# Patient Record
Sex: FEMALE | Race: WHITE | URBAN - METROPOLITAN AREA
[De-identification: names, ages, dates, MRNs, and addresses within clinical notes are randomized per-mention and may not be internally consistent; named-entity substitution may affect disease eponyms.]

---

## 2017-01-04 ENCOUNTER — EMERGENCY (EMERGENCY)
Facility: HOSPITAL | Age: 37
LOS: 0 days | Discharge: HOME | End: 2017-01-04
Admitting: FAMILY MEDICINE

## 2017-06-26 ENCOUNTER — EMERGENCY (EMERGENCY)
Facility: HOSPITAL | Age: 37
LOS: 0 days | Discharge: HOME | End: 2017-06-26
Admitting: FAMILY MEDICINE

## 2017-06-26 DIAGNOSIS — S93.601A UNSPECIFIED SPRAIN OF RIGHT FOOT, INITIAL ENCOUNTER: ICD-10-CM

## 2017-06-26 DIAGNOSIS — Z79.899 OTHER LONG TERM (CURRENT) DRUG THERAPY: ICD-10-CM

## 2017-06-26 DIAGNOSIS — J45.909 UNSPECIFIED ASTHMA, UNCOMPLICATED: ICD-10-CM

## 2017-06-26 DIAGNOSIS — J45.901 UNSPECIFIED ASTHMA WITH (ACUTE) EXACERBATION: ICD-10-CM
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
## 2017-06-26 DIAGNOSIS — K50.90 CROHN'S DISEASE, UNSPECIFIED, WITHOUT COMPLICATIONS: ICD-10-CM

## 2017-06-26 DIAGNOSIS — M79.671 PAIN IN RIGHT FOOT: ICD-10-CM
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
## 2017-06-27 DIAGNOSIS — Z87.891 PERSONAL HISTORY OF NICOTINE DEPENDENCE: ICD-10-CM

## 2017-06-27 DIAGNOSIS — J45.909 UNSPECIFIED ASTHMA, UNCOMPLICATED: ICD-10-CM

## 2017-06-27 DIAGNOSIS — M54.5 LOW BACK PAIN: ICD-10-CM
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
## 2018-01-24 ENCOUNTER — EMERGENCY (EMERGENCY)
Facility: HOSPITAL | Age: 38
LOS: 0 days | Discharge: HOME | End: 2018-01-24
Admitting: FAMILY MEDICINE

## 2018-01-24 DIAGNOSIS — J45.909 UNSPECIFIED ASTHMA, UNCOMPLICATED: ICD-10-CM

## 2018-01-24 DIAGNOSIS — R51 HEADACHE: ICD-10-CM

## 2018-01-24 DIAGNOSIS — Z79.811 LONG TERM (CURRENT) USE OF AROMATASE INHIBITORS: ICD-10-CM

## 2018-01-24 DIAGNOSIS — J45.901 UNSPECIFIED ASTHMA WITH (ACUTE) EXACERBATION: ICD-10-CM

## 2018-01-24 DIAGNOSIS — Z79.52 LONG TERM (CURRENT) USE OF SYSTEMIC STEROIDS: ICD-10-CM

## 2018-01-24 DIAGNOSIS — K50.90 CROHN'S DISEASE, UNSPECIFIED, WITHOUT COMPLICATIONS: ICD-10-CM

## 2018-01-24 DIAGNOSIS — M79.601 PAIN IN RIGHT ARM: ICD-10-CM

## 2018-01-24 DIAGNOSIS — Z79.51 LONG TERM (CURRENT) USE OF INHALED STEROIDS: ICD-10-CM

## 2018-01-24 DIAGNOSIS — Z79.899 OTHER LONG TERM (CURRENT) DRUG THERAPY: ICD-10-CM

## 2018-11-02 ENCOUNTER — TRANSCRIPTION ENCOUNTER (OUTPATIENT)
Age: 38
End: 2018-11-02

## 2018-11-20 ENCOUNTER — EMERGENCY (EMERGENCY)
Facility: HOSPITAL | Age: 38
LOS: 0 days | Discharge: HOME | End: 2018-11-21
Attending: EMERGENCY MEDICINE | Admitting: EMERGENCY MEDICINE

## 2018-11-20 VITALS
DIASTOLIC BLOOD PRESSURE: 105 MMHG | RESPIRATION RATE: 18 BRPM | HEART RATE: 125 BPM | TEMPERATURE: 96 F | SYSTOLIC BLOOD PRESSURE: 196 MMHG | OXYGEN SATURATION: 97 %

## 2018-11-20 VITALS
SYSTOLIC BLOOD PRESSURE: 124 MMHG | DIASTOLIC BLOOD PRESSURE: 78 MMHG | TEMPERATURE: 97 F | RESPIRATION RATE: 18 BRPM | OXYGEN SATURATION: 98 % | HEART RATE: 96 BPM

## 2018-11-20 DIAGNOSIS — R07.89 OTHER CHEST PAIN: ICD-10-CM

## 2018-11-20 DIAGNOSIS — M54.2 CERVICALGIA: ICD-10-CM

## 2018-11-20 DIAGNOSIS — F41.9 ANXIETY DISORDER, UNSPECIFIED: ICD-10-CM

## 2018-11-20 DIAGNOSIS — J45.909 UNSPECIFIED ASTHMA, UNCOMPLICATED: ICD-10-CM

## 2018-11-20 DIAGNOSIS — F17.210 NICOTINE DEPENDENCE, CIGARETTES, UNCOMPLICATED: ICD-10-CM

## 2018-11-20 DIAGNOSIS — M54.9 DORSALGIA, UNSPECIFIED: ICD-10-CM

## 2018-11-20 DIAGNOSIS — R06.02 SHORTNESS OF BREATH: ICD-10-CM

## 2018-11-20 DIAGNOSIS — G89.29 OTHER CHRONIC PAIN: ICD-10-CM

## 2018-11-20 DIAGNOSIS — R07.9 CHEST PAIN, UNSPECIFIED: ICD-10-CM

## 2018-11-20 LAB
ALBUMIN SERPL ELPH-MCNC: 5 G/DL — SIGNIFICANT CHANGE UP (ref 3.5–5.2)
ALP SERPL-CCNC: 85 U/L — SIGNIFICANT CHANGE UP (ref 30–115)
ALT FLD-CCNC: 39 U/L — SIGNIFICANT CHANGE UP (ref 0–41)
ANION GAP SERPL CALC-SCNC: 22 MMOL/L — HIGH (ref 7–14)
AST SERPL-CCNC: 30 U/L — SIGNIFICANT CHANGE UP (ref 0–41)
BASE EXCESS BLDV CALC-SCNC: -1.2 MMOL/L — SIGNIFICANT CHANGE UP (ref -2–2)
BASOPHILS # BLD AUTO: 0.05 K/UL — SIGNIFICANT CHANGE UP (ref 0–0.2)
BASOPHILS NFR BLD AUTO: 0.6 % — SIGNIFICANT CHANGE UP (ref 0–1)
BILIRUB SERPL-MCNC: 0.4 MG/DL — SIGNIFICANT CHANGE UP (ref 0.2–1.2)
BUN SERPL-MCNC: 10 MG/DL — SIGNIFICANT CHANGE UP (ref 10–20)
CA-I SERPL-SCNC: 1.22 MMOL/L — SIGNIFICANT CHANGE UP (ref 1.12–1.3)
CALCIUM SERPL-MCNC: 10 MG/DL — SIGNIFICANT CHANGE UP (ref 8.5–10.1)
CHLORIDE SERPL-SCNC: 95 MMOL/L — LOW (ref 98–110)
CO2 SERPL-SCNC: 19 MMOL/L — SIGNIFICANT CHANGE UP (ref 17–32)
CREAT SERPL-MCNC: 0.8 MG/DL — SIGNIFICANT CHANGE UP (ref 0.7–1.5)
D DIMER BLD IA.RAPID-MCNC: 85 NG/ML DDU — SIGNIFICANT CHANGE UP (ref 0–230)
EOSINOPHIL # BLD AUTO: 0.23 K/UL — SIGNIFICANT CHANGE UP (ref 0–0.7)
EOSINOPHIL NFR BLD AUTO: 2.6 % — SIGNIFICANT CHANGE UP (ref 0–8)
GAS PNL BLDV: 133 MMOL/L — LOW (ref 136–145)
GAS PNL BLDV: SIGNIFICANT CHANGE UP
GLUCOSE SERPL-MCNC: 93 MG/DL — SIGNIFICANT CHANGE UP (ref 70–99)
HCG SERPL QL: NEGATIVE — SIGNIFICANT CHANGE UP
HCO3 BLDV-SCNC: 24 MMOL/L — SIGNIFICANT CHANGE UP (ref 22–29)
HCT VFR BLD CALC: 44.4 % — SIGNIFICANT CHANGE UP (ref 37–47)
HCT VFR BLDA CALC: 51.2 % — HIGH (ref 34–44)
HGB BLD CALC-MCNC: 16.7 G/DL — SIGNIFICANT CHANGE UP (ref 14–18)
HGB BLD-MCNC: 15.7 G/DL — SIGNIFICANT CHANGE UP (ref 12–16)
IMM GRANULOCYTES NFR BLD AUTO: 0.3 % — SIGNIFICANT CHANGE UP (ref 0.1–0.3)
LACTATE BLDV-MCNC: 2.8 MMOL/L — HIGH (ref 0.5–1.6)
LACTATE SERPL-SCNC: 2.5 MMOL/L — HIGH (ref 0.5–2.2)
LIDOCAIN IGE QN: 25 U/L — SIGNIFICANT CHANGE UP (ref 7–60)
LYMPHOCYTES # BLD AUTO: 3.13 K/UL — SIGNIFICANT CHANGE UP (ref 1.2–3.4)
LYMPHOCYTES # BLD AUTO: 35.4 % — SIGNIFICANT CHANGE UP (ref 20.5–51.1)
MAGNESIUM SERPL-MCNC: 2.3 MG/DL — SIGNIFICANT CHANGE UP (ref 1.8–2.4)
MCHC RBC-ENTMCNC: 32.4 PG — HIGH (ref 27–31)
MCHC RBC-ENTMCNC: 35.4 G/DL — SIGNIFICANT CHANGE UP (ref 32–37)
MCV RBC AUTO: 91.5 FL — SIGNIFICANT CHANGE UP (ref 81–99)
MONOCYTES # BLD AUTO: 0.78 K/UL — HIGH (ref 0.1–0.6)
MONOCYTES NFR BLD AUTO: 8.8 % — SIGNIFICANT CHANGE UP (ref 1.7–9.3)
NEUTROPHILS # BLD AUTO: 4.62 K/UL — SIGNIFICANT CHANGE UP (ref 1.4–6.5)
NEUTROPHILS NFR BLD AUTO: 52.3 % — SIGNIFICANT CHANGE UP (ref 42.2–75.2)
NRBC # BLD: 0 /100 WBCS — SIGNIFICANT CHANGE UP (ref 0–0)
PCO2 BLDV: 39 MMHG — LOW (ref 41–51)
PH BLDV: 7.39 — SIGNIFICANT CHANGE UP (ref 7.26–7.43)
PLATELET # BLD AUTO: 363 K/UL — SIGNIFICANT CHANGE UP (ref 130–400)
PO2 BLDV: 28 MMHG — SIGNIFICANT CHANGE UP (ref 20–40)
POTASSIUM BLDV-SCNC: 4.1 MMOL/L — SIGNIFICANT CHANGE UP (ref 3.3–5.6)
POTASSIUM SERPL-MCNC: 4.7 MMOL/L — SIGNIFICANT CHANGE UP (ref 3.5–5)
POTASSIUM SERPL-SCNC: 4.7 MMOL/L — SIGNIFICANT CHANGE UP (ref 3.5–5)
PROT SERPL-MCNC: 7.8 G/DL — SIGNIFICANT CHANGE UP (ref 6–8)
RBC # BLD: 4.85 M/UL — SIGNIFICANT CHANGE UP (ref 4.2–5.4)
RBC # FLD: 12.1 % — SIGNIFICANT CHANGE UP (ref 11.5–14.5)
SAO2 % BLDV: 58 % — SIGNIFICANT CHANGE UP
SODIUM SERPL-SCNC: 136 MMOL/L — SIGNIFICANT CHANGE UP (ref 135–146)
TROPONIN T SERPL-MCNC: <0.01 NG/ML — SIGNIFICANT CHANGE UP
TROPONIN T SERPL-MCNC: <0.01 NG/ML — SIGNIFICANT CHANGE UP
WBC # BLD: 8.84 K/UL — SIGNIFICANT CHANGE UP (ref 4.8–10.8)
WBC # FLD AUTO: 8.84 K/UL — SIGNIFICANT CHANGE UP (ref 4.8–10.8)

## 2018-11-20 RX ORDER — IPRATROPIUM BROMIDE 0.2 MG/ML
500 SOLUTION, NON-ORAL INHALATION ONCE
Qty: 0 | Refills: 0 | Status: COMPLETED | OUTPATIENT
Start: 2018-11-20 | End: 2018-11-20

## 2018-11-20 RX ORDER — BUDESONIDE AND FORMOTEROL FUMARATE DIHYDRATE 160; 4.5 UG/1; UG/1
2 AEROSOL RESPIRATORY (INHALATION)
Qty: 1 | Refills: 0
Start: 2018-11-20 | End: 2018-12-19

## 2018-11-20 RX ORDER — ASPIRIN/CALCIUM CARB/MAGNESIUM 324 MG
325 TABLET ORAL ONCE
Qty: 0 | Refills: 0 | Status: COMPLETED | OUTPATIENT
Start: 2018-11-20 | End: 2018-11-20

## 2018-11-20 RX ORDER — SODIUM CHLORIDE 9 MG/ML
1000 INJECTION INTRAMUSCULAR; INTRAVENOUS; SUBCUTANEOUS ONCE
Qty: 0 | Refills: 0 | Status: COMPLETED | OUTPATIENT
Start: 2018-11-20 | End: 2018-11-20

## 2018-11-20 RX ORDER — ALPRAZOLAM 0.25 MG
0.25 TABLET ORAL ONCE
Qty: 0 | Refills: 0 | Status: DISCONTINUED | OUTPATIENT
Start: 2018-11-20 | End: 2018-11-20

## 2018-11-20 RX ORDER — ALBUTEROL 90 UG/1
1 AEROSOL, METERED ORAL DAILY
Qty: 0 | Refills: 0 | Status: DISCONTINUED | OUTPATIENT
Start: 2018-11-20 | End: 2018-11-20

## 2018-11-20 RX ADMIN — Medication 125 MILLIGRAM(S): at 18:17

## 2018-11-20 RX ADMIN — SODIUM CHLORIDE 1000 MILLILITER(S): 9 INJECTION INTRAMUSCULAR; INTRAVENOUS; SUBCUTANEOUS at 12:23

## 2018-11-20 RX ADMIN — Medication 50 MILLIGRAM(S): at 18:17

## 2018-11-20 RX ADMIN — Medication 325 MILLIGRAM(S): at 12:23

## 2018-11-20 RX ADMIN — Medication 500 MICROGRAM(S): at 13:22

## 2018-11-20 RX ADMIN — Medication 0.25 MILLIGRAM(S): at 12:23

## 2018-11-20 NOTE — ED PROVIDER NOTE - PHYSICAL EXAMINATION
VITAL SIGNS: I have reviewed nursing notes and confirm.  CONSTITUTIONAL: Well-developed; well-nourished; in no acute distress. uncomfortable  SKIN: skin exam is warm and dry, no acute rash.   HEAD: Normocephalic; atraumatic.  EYES:  EOM intact; conjunctiva and sclera clear.  ENT: No nasal discharge; airway clear. moist oral mucosa;   NECK: Supple; non tender.  CARD: S1, S2 normal; no murmurs, gallops, or rubs. Regular rate and rhythm. posterior tibial and radial pulses 2+  RESP: No wheezes, rales or rhonchi. cta b/l. no use of accessory muscles. no retractions  ABD: Normal bowel sounds; soft; non-distended; non-tender; no rebound.   EXT: Normal ROM. No  cyanosis or edema.  BACK: No cva tenderness  LYMPH: No acute cervical adenopathy.  NEURO: Alert, oriented, grossly unremarkable.    PSYCH: Cooperative, appropriate.

## 2018-11-20 NOTE — ED PROVIDER NOTE - MEDICAL DECISION MAKING DETAILS
Patient presented with chest pain and dyspnea. No risk factors for PE, but tachycardic so could not PERC out. Did D dimer which was negative. Work up in ED otherwise negative. Patient calmed down in ED, HR stabilized. However, placed in obs given severity of initial symptoms. Patient otherwise HD stable and agreeable with this plan.

## 2018-11-20 NOTE — ED PROVIDER NOTE - ATTENDING CONTRIBUTION TO CARE
38 year old female, pmhx anxiety, asthma, presenting with a 1 day history of tight substernal chest pain that is mildly pleuritic associated with extreme anxiety and palpitations as well as dyspnea. States she has not had this kind of episode before, and states this feels more severe than her asthma. Otherwise denies fevers, headache, vision changes, weakness/numbness, confusion, URI symptoms, neck pain, back pain,  cough, nausea, vomiting, abdominal pain, diarrhea, constipation, blood in stool/dark stools, urinary symptoms, vaginal bleeding/discharge, leg swelling, rash, recent travel or sick contacts.    Vital Signs: I have reviewed the initial vital signs.  Constitutional: Well-nourished, appears stated age, Severe emotional distress  HEENT: Airway patent, moist MM, no erythema/swelling/deformity of oral structures. EOMI, PERRLA.  CV: (+) tachycardic, regular rhythm well-perfused extremities, 2+ b/l DP and radial pulses equal.  Lungs: BCTA, no increased WOB.  ABD: NTND, no guarding or rebound, no pulsatile mass, no hernias.   MSK: Neck supple, nontender, nl ROM, no stepoff. Chest nontender. Back nontender in TLS spine or to b/l bony structures or flanks. Ext nontender, nl rom, no deformity.   INTEG: Skin warm, dry, no rash.  NEURO: A&Ox3, CN II-XII intact, normal strength 5/5 all 4 ext, nl sensation throughout, normal speech and coordination.  PSYCH: Calm, cooperative, normal affect and interaction.    Will get EKG, ACS work up, patient also tachycardic with pleuritic chest pain so will get d dimer, give IVF, re-eval

## 2018-11-20 NOTE — ED ADULT NURSE NOTE - CHPI ED NUR SYMPTOMS NEG
no fever/no diaphoresis/no back pain/no syncope/no nausea/no vomiting/no chills/no congestion/no dizziness

## 2018-11-20 NOTE — ED CDU PROVIDER DISPOSITION NOTE - CLINICAL COURSE
pt presented for eval of chest pain. placed in edou under low risk chest pain protocol. pt with workup including full set of labs, cxray wnl, 2 sets neg cardiac enzymes, 2 ekg with no acute ischemic changes. pt treated with nebs, steroids, pt unwillling to stay overnight for further re-eval. meds refilled will dc

## 2018-11-20 NOTE — ED CDU PROVIDER DISPOSITION NOTE - CARE PROVIDER_API CALL
Christophe Serrato), Cardiovascular Disease; Interventional Cardiology  [address] [place] [address]  Phone: [phone]  Fax: [phone]    Delio Resendez), Critical Care Medicine; Internal Medicine; Pulmonary Disease; Sleep Medicine  [address] [place] [address]  Phone: [phone]  Fax: [phone]

## 2018-11-20 NOTE — ED CDU PROVIDER INITIAL DAY NOTE - NS ED ROS FT
CONST: No fever, chills or bodyaches  EYES: No pain, redness, drainage or visual changes.  ENT: No ear pain or discharge, nasal discharge or congestion. No sore throat  CARD: No chest pain, palpitations  RESP: see HPI  GI: No abdominal pain, N/V/D  MS: No joint pain, back pain or extremity pain/injury  SKIN: No rashes

## 2018-11-20 NOTE — ED PROVIDER NOTE - OBJECTIVE STATEMENT
38 year old female, pmhx anxiety, asthma, presenting with a 1 day history of tight substernal chest pain that is mildly pleuritic associated with extreme anxiety and palpitations as well as dyspnea. States she has not had this kind of episode before, and states this feels more severe than her asthma. Otherwise denies fevers, headache, vision changes, weakness/numbness, confusion, URI symptoms, neck pain, back pain,  cough, nausea, vomiting, abdominal pain, diarrhea, constipation, blood in stool/dark stools, urinary symptoms, vaginal bleeding/discharge, leg swelling, rash, recent travel or sick contacts.

## 2018-11-20 NOTE — ED CDU PROVIDER INITIAL DAY NOTE - PHYSICAL EXAMINATION
CONST: Well appearing in NAD, anxious  EYES: PERRL, EOMI, Sclera and conjunctiva clear.   NECK: Non-tender,   CARD: Normal S1 S2; Normal rate and rhythm  RESP: Equal BS B/L, No wheezes, rhonchi or rales. No distress  GI: Soft, non-tender, non-distended.  MS: Normal ROM in all extremities. No midline spinal tenderness.  SKIN: Warm, dry, no acute rashes. Good turgor  NEURO: A&Ox3, No focal deficits. Strength 5/5 with no sensory deficits. Inability to extend digits on R hand.

## 2018-11-20 NOTE — ED CDU PROVIDER INITIAL DAY NOTE - PROGRESS NOTE DETAILS
pt unwilling to stay for further eval. 2 sets negative. will ama. pt treated with nebs, steroid. plan to observe, possible stress in am. The patient wishes to leave against medical advice.  I have discussed the risks, benefits and alternatives (including the possibility of worsening of disease, pain, permanent disability, and/or death) with the patient and his/her family (if available).  The patient voices understanding of these risks, benefits, and alternatives and still wishes to sign out against medical advice.  The patient is awake, alert, oriented  x 3 and has demonstrated capacity to refuse/direct care.  I have advised the patient that they can and should return immediately should they develop any worse/different/additional symptoms, or if they change their mind and want to continue their care. Patient refused neb treatment secondary to feeling "shakey" from IV solumedrol. Patient also reports not feeling any better. No active wheezing  The patient wishes to leave against medical advice.  I have discussed the risks, benefits and alternatives (including the possibility of worsening of disease, pain, permanent disability, and/or death) with the patient and his/her family (if available).  The patient voices understanding of these risks, benefits, and alternatives and still wishes to sign out against medical advice.  The patient is awake, alert, oriented  x 3 and has demonstrated capacity to refuse/direct care.  I have advised the patient that they can and should return immediately should they develop any worse/different/additional symptoms, or if they change their mind and want to continue their care. Patient removed IV herself and walked out of ED without signing AMA/DC papers. Was going to aide in patient in scheduling rapid clinic f/u, however patient left prior to this.

## 2018-11-20 NOTE — ED CDU PROVIDER INITIAL DAY NOTE - ATTENDING CONTRIBUTION TO CARE
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 pt has no pe risk factors.  CON: ao x 3, HENMT: clear oropharynx,  hoarse voice, neck supple,  CV: rrr, equal pulses b/l, RESP: cta b/l, GI:  soft, nontender, no rebound, no guarding, SKIN: no rash, MSK: no deformities, NEURO: no gross motor or sensory deficit Psychiatric: appropriate mood, appropriate affect

## 2018-11-20 NOTE — ED CDU PROVIDER INITIAL DAY NOTE - MEDICAL DECISION MAKING DETAILS
pt placed in edou under low risk chest pain protocol. pt with 2 sets negative, no ischemic changes on ekg, no events on cardiac monitor, cxray wnl, pt treated with neb and steroids. unwilling to stay for further eval and possible ccta/stress in am. will ama. will rx meds, card/pulm and medicine clinic f/u.

## 2018-11-20 NOTE — ED PROVIDER NOTE - NS ED ROS FT
Constitutional: (-) fever  Eyes/ENT: (-) blurry vision, (-) epistaxis  Cardiovascular: +chest pain, (-) syncope  Respiratory: (-) cough, +shortness of breath  Gastrointestinal: (-) vomiting, (-) diarrhea  Musculoskeletal: (-) neck pain, (-) back pain, (-) joint pain  Integumentary: (-) rash, (-) edema  Neurological: (-) headache, (-) altered mental status  Psychiatric: (-) hallucinations  Allergic/Immunologic: (-) pruritus

## 2018-11-20 NOTE — ED ADULT NURSE REASSESSMENT NOTE - NS ED NURSE REASSESS COMMENT FT1
Pts safety maintained, iv patent.A&OX4,speaking coherently. Pt denies any chest pain at this time. Pt on cardiac monitor/continous pulse ox. Pending tests/results. Will continue to monitor

## 2018-11-20 NOTE — ED CDU PROVIDER INITIAL DAY NOTE - OBJECTIVE STATEMENT
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 Patient reports no relief with at home nebs and that this feels "more severe" than her asthma. She also reports yearly bronchitis  and is typically on steroids at this time every year, however lost insurance and does not have a PMD. Denies fever, chills, cough, leg swelling, prolonged travel or usage of OCPs. Mother-MI at 61yo. No prior cardiac w/u.

## 2018-12-01 ENCOUNTER — OUTPATIENT (OUTPATIENT)
Dept: OUTPATIENT SERVICES | Facility: HOSPITAL | Age: 38
LOS: 1 days | End: 2018-12-01
Payer: MEDICAID

## 2018-12-03 ENCOUNTER — EMERGENCY (EMERGENCY)
Facility: HOSPITAL | Age: 38
LOS: 0 days | Discharge: HOME | End: 2018-12-03
Admitting: EMERGENCY MEDICINE

## 2018-12-03 VITALS
DIASTOLIC BLOOD PRESSURE: 72 MMHG | SYSTOLIC BLOOD PRESSURE: 148 MMHG | HEART RATE: 78 BPM | TEMPERATURE: 98 F | RESPIRATION RATE: 18 BRPM | OXYGEN SATURATION: 100 %

## 2018-12-03 DIAGNOSIS — Z32.02 ENCOUNTER FOR PREGNANCY TEST, RESULT NEGATIVE: ICD-10-CM

## 2018-12-03 DIAGNOSIS — J45.909 UNSPECIFIED ASTHMA, UNCOMPLICATED: ICD-10-CM

## 2018-12-03 DIAGNOSIS — Z79.899 OTHER LONG TERM (CURRENT) DRUG THERAPY: ICD-10-CM

## 2018-12-03 DIAGNOSIS — F17.200 NICOTINE DEPENDENCE, UNSPECIFIED, UNCOMPLICATED: ICD-10-CM

## 2018-12-03 LAB — HCG SERPL-ACNC: <0.6 MIU/ML — SIGNIFICANT CHANGE UP

## 2018-12-03 NOTE — ED PROVIDER NOTE - NSFOLLOWUPCLINICS_GEN_ALL_ED_FT
[facility]  Medicine  [address], [place]   Phone: [phone]  Fax:   Follow Up Time: 1-3 Days [facility]  Medicine  [address], [place]   Phone: [phone]  Fax:   Follow Up Time: 1-3 Days    [facility]  OB/GYN  [address] [place] [address]  Phone: [phone]  Fax:   Follow Up Time:

## 2018-12-03 NOTE — ED PROVIDER NOTE - PHYSICAL EXAMINATION
GENERAL: Well-developed, well-nourished, in no acute distress. sitting in ED chairs with sunglasses on.  CARDIOVASCULAR: Regular rate and rhythm, S1 and S2 present. No murmurs, rubs, or gallops. No edema. 2+ radial pulses bilaterally.  RESPIRATORY: Normal effort. Clear to auscultation bilaterally without wheezes, rales, or rhonchi.  GI: Normal bowel sounds. Abdomen soft and non-distended. Nontender in all four quadrants.  : No CVA tenderness bilaterally.  INTEGUMENTARY: Pink, warm, dry, no rashes. Capillary refill <2 seconds.  NEURO: A&Ox3. Moving all four extremities equally.

## 2018-12-03 NOTE — ED PROVIDER NOTE - PROGRESS NOTE DETAILS
patient refusing urine pregnancy testing, requesting serum bhcg. serum pregnancy testing negative I agree with evaluation and treatment of this patient with GARY Bauer.

## 2018-12-03 NOTE — ED ADULT NURSE NOTE - OBJECTIVE STATEMENT
Pt with C.O  B/.L hand numbness stiffness on and off for 1 year worse for 2 weeks also requesting pregnancy test .

## 2018-12-03 NOTE — ED PROVIDER NOTE - NS ED ROS FT
CONSTITUTIONAL: (-) fevers, (-) chills,   RESPIRATORY: (-) cough, (-) sputum, (-) shortness of breath  GI: (-) nausea/vomiting, (-) diarrhea/constipation, (-) abdominal pain  : (-) pelvic pain, (-) dysuria, (-) hematuria, (-) frequency, (-) urgency, (-) flank pain  INTEGUMENTARY: (-) rashes, (-) wounds

## 2018-12-03 NOTE — ED PROVIDER NOTE - OBJECTIVE STATEMENT
38 year old female with a history of asthma, chronic pain, anxiety presents to the ED requesting a serum pregnancy test. Patient states that she is concerned she may be pregnant as she has had unprotected intercourse during the last 2 months. She took Plan B at the end of September and has been using a friend's OCPs intermittently. LMP was at the end of september, she has had intermittent vaginal bleeding since. Denies vaginal discharge, abdominal pain, pelvic pain, dysuria, frequency, fevers, chills. Patient would like a pregnancy test because she is currently taking several medications for her chronic pain and asthma that she is concerned may affect a pregnancy. She is requesting a blood test as she has a history of two pregnancies with negative urine tests at home.

## 2018-12-04 PROBLEM — F41.9 ANXIETY DISORDER, UNSPECIFIED: Chronic | Status: ACTIVE | Noted: 2018-11-20

## 2018-12-04 PROBLEM — J45.909 UNSPECIFIED ASTHMA, UNCOMPLICATED: Chronic | Status: ACTIVE | Noted: 2018-11-20

## 2018-12-06 DIAGNOSIS — Z71.89 OTHER SPECIFIED COUNSELING: ICD-10-CM

## 2018-12-13 ENCOUNTER — EMERGENCY (EMERGENCY)
Facility: HOSPITAL | Age: 38
LOS: 0 days | Discharge: HOME | End: 2018-12-14
Admitting: EMERGENCY MEDICINE

## 2018-12-13 VITALS
HEART RATE: 115 BPM | DIASTOLIC BLOOD PRESSURE: 66 MMHG | OXYGEN SATURATION: 96 % | WEIGHT: 149.91 LBS | SYSTOLIC BLOOD PRESSURE: 123 MMHG | TEMPERATURE: 99 F | RESPIRATION RATE: 18 BRPM

## 2018-12-13 DIAGNOSIS — Z79.899 OTHER LONG TERM (CURRENT) DRUG THERAPY: ICD-10-CM

## 2018-12-13 DIAGNOSIS — M54.6 PAIN IN THORACIC SPINE: ICD-10-CM
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
## 2018-12-13 DIAGNOSIS — J45.909 UNSPECIFIED ASTHMA, UNCOMPLICATED: ICD-10-CM

## 2018-12-13 DIAGNOSIS — M54.5 LOW BACK PAIN: ICD-10-CM
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
## 2018-12-13 DIAGNOSIS — F17.200 NICOTINE DEPENDENCE, UNSPECIFIED, UNCOMPLICATED: ICD-10-CM
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
## 2018-12-13 DIAGNOSIS — M54.9 DORSALGIA, UNSPECIFIED: ICD-10-CM
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
## 2018-12-13 NOTE — ED ADULT TRIAGE NOTE - CHIEF COMPLAINT QUOTE
pt stated she was raking leaves 2 days ago and threw back out. pt has hx of Herniated disks. pt stated she took lyrica and oxycodone  prior to coming

## 2018-12-14 VITALS — SYSTOLIC BLOOD PRESSURE: 105 MMHG | RESPIRATION RATE: 18 BRPM | HEART RATE: 91 BPM | DIASTOLIC BLOOD PRESSURE: 56 MMHG

## 2018-12-14 RX ORDER — OXYCODONE AND ACETAMINOPHEN 5; 325 MG/1; MG/1
1 TABLET ORAL ONCE
Qty: 0 | Refills: 0 | Status: DISCONTINUED | OUTPATIENT
Start: 2018-12-14 | End: 2018-12-14

## 2018-12-14 RX ORDER — METHOCARBAMOL 500 MG/1
1000 TABLET, FILM COATED ORAL ONCE
Qty: 0 | Refills: 0 | Status: COMPLETED | OUTPATIENT
Start: 2018-12-14 | End: 2018-12-14

## 2018-12-14 RX ORDER — TIZANIDINE 4 MG/1
1 TABLET ORAL
Qty: 12 | Refills: 0 | OUTPATIENT
Start: 2018-12-14 | End: 2018-12-17

## 2018-12-14 RX ADMIN — OXYCODONE AND ACETAMINOPHEN 1 TABLET(S): 5; 325 TABLET ORAL at 00:51

## 2018-12-14 RX ADMIN — METHOCARBAMOL 1000 MILLIGRAM(S): 500 TABLET, FILM COATED ORAL at 00:51

## 2018-12-14 RX ADMIN — OXYCODONE AND ACETAMINOPHEN 1 TABLET(S): 5; 325 TABLET ORAL at 01:09

## 2018-12-14 NOTE — ED PROVIDER NOTE - NS ED ROS FT
Except as documented in HPI, all other ROS negative.   GENERAL: Denies fever/chills, loss of appetite/weight or fatigue.  SKIN: Denies rashes, abrasions, lacerations, ecchymosis, erythema, or edema.  HEAD: Denies headache, dizziness or trauma.  GI: Denies abdominal pain, n/v/d.   : Denies hematuria, dysuria or frequency. Denies urinary incontinence.   MSK: + back pain.   NEURO: Denies paresthesias, tingling, weakness.

## 2018-12-14 NOTE — ED PROVIDER NOTE - OBJECTIVE STATEMENT
Pt is a 37 y/o Female, PMHX of Crohn's, Asthma, chronic back pain, presents to ED w/ back pain x 3 days. Pt states she was gardening approx 3 days ago and today was doing laundry and heavy lifting and cleaning around the house, and she states her back went into spasms and she is not able to move or stand up straight. Pt denies fever, chills, urinary/fecal incontinence, trauma, prior back surgeries, numbness, weakness, abdominal pain, n/v. Pt has received previous epidural injections in her cervical spine by pain management.

## 2018-12-14 NOTE — ED PROVIDER NOTE - PHYSICAL EXAMINATION
VITAL SIGNS: I have reviewed the initial vital signs.   CONSTITUTIONAL: Awake, alert. Well-developed; well-nourished; in no distress. Non-toxic appearing.   SKIN: No rash, vesicles/lesion, abrasions or lacerations. No ecchymosis or signs of trauma.   HEAD: Normocephalic; atraumatic.   CARD: No chest wall deformity or tenderness. S1, S2 normal; no murmurs, gallops, or rubs. Regular rate and rhythm.  RESP: Good air movement. Lungs CTAB. No crackles, wheezes, rales or rhonchi.  ABD: Soft; non-distended; non-tender.   EXT: No bony deformity or tenderness. Normal ROM x 4 extremities. + parathoracic & paralumbar ttp. No midline spinal tenderness,   NEURO: A&Ox3. GCS 15. Normal speech. CN 2-12 intact. Strength 5/5 UE/LE b/l. No sensory deficits. n/v intact UE/LE b/l, pulses symmetrical.

## 2018-12-14 NOTE — ED PROVIDER NOTE - CARE PROVIDER_API CALL
Arvind Orellana), Neurology  [address] [place] [address]  Phone: [phone]  Fax: [phone]    Pramod Christianson (MD), Neurology; Neuromuscular Medicine  [address] [place] [id number]  Phone: [phone]  Fax: [phone]    Patricia Lizarraga), Neurology  [address] [place] [address] [address]  [place] [address]  Phone: [phone]  Fax: [phone]    Serge Wheeler), Neurology  [address] [place] [id number]  Phone: [phone]  Fax: [phone]

## 2018-12-14 NOTE — ED PROVIDER NOTE - CARE PROVIDERS DIRECT ADDRESSES
,DirectAddress_[email or web address]

## 2018-12-14 NOTE — ED ADULT NURSE NOTE - OBJECTIVE STATEMENT
Pt states she was doing laundry and afterwards could not stand straight d/t to pain. Denies n/v/d, denies trauma.

## 2018-12-14 NOTE — ED PROVIDER NOTE - MEDICAL DECISION MAKING DETAILS
Given no midline tenderness and hx of heavy lifting, likely muscular - given percocet and Robaxin in ED. Will send Tizanidine to pharmacy. Instructed pt to follow up with neurology, given info. Given no midline tenderness and hx of heavy lifting, likely muscular - given Percocet and Robaxin in ED. Was able to stand up and ambulate upon discharge. HR improved to 91. Will send Tizanidine to pharmacy. Instructed pt to follow up with neurology, given info.

## 2018-12-14 NOTE — ED ADULT NURSE REASSESSMENT NOTE - NS ED NURSE REASSESS COMMENT FT1
No s/s of distress, pt ambulating around room intermittently. Comfort measures offered, will cont to monitor.

## 2018-12-14 NOTE — ED ADULT NURSE NOTE - NSIMPLEMENTINTERV_GEN_ALL_ED
Implemented All Universal Safety Interventions:  [place] to call system. Call bell, personal items and telephone within reach. Instruct patient to call for assistance. Room bathroom lighting operational. Non-slip footwear when patient is off stretcher. Physically safe environment: no spills, clutter or unnecessary equipment. Stretcher in lowest position, wheels locked, appropriate side rails in place.

## 2019-01-01 PROCEDURE — G9001: CPT

## 2019-02-01 ENCOUNTER — OUTPATIENT (OUTPATIENT)
Dept: OUTPATIENT SERVICES | Facility: HOSPITAL | Age: 39
LOS: 1 days | End: 2019-02-01

## 2019-02-11 ENCOUNTER — INPATIENT (INPATIENT)
Facility: HOSPITAL | Age: 39
LOS: 2 days | Discharge: HOME | End: 2019-02-14
Attending: INTERNAL MEDICINE | Admitting: INTERNAL MEDICINE

## 2019-02-11 VITALS
DIASTOLIC BLOOD PRESSURE: 62 MMHG | HEART RATE: 113 BPM | SYSTOLIC BLOOD PRESSURE: 119 MMHG | RESPIRATION RATE: 18 BRPM | TEMPERATURE: 96 F | OXYGEN SATURATION: 96 %

## 2019-02-11 DIAGNOSIS — M54.9 DORSALGIA, UNSPECIFIED: ICD-10-CM

## 2019-02-11 DIAGNOSIS — F11.90 OPIOID USE, UNSPECIFIED, UNCOMPLICATED: ICD-10-CM

## 2019-02-11 DIAGNOSIS — Z87.828 PERSONAL HISTORY OF OTHER (HEALED) PHYSICAL INJURY AND TRAUMA: ICD-10-CM

## 2019-02-11 DIAGNOSIS — A70: ICD-10-CM

## 2019-02-11 DIAGNOSIS — G89.29 OTHER CHRONIC PAIN: ICD-10-CM

## 2019-02-11 DIAGNOSIS — M51.9 UNSPECIFIED THORACIC, THORACOLUMBAR AND LUMBOSACRAL INTERVERTEBRAL DISC DISORDER: ICD-10-CM

## 2019-02-11 DIAGNOSIS — R07.9 CHEST PAIN, UNSPECIFIED: ICD-10-CM

## 2019-02-11 DIAGNOSIS — T65.94XA TOXIC EFFECT OF UNSPECIFIED SUBSTANCE, UNDETERMINED, INITIAL ENCOUNTER: ICD-10-CM

## 2019-02-11 DIAGNOSIS — J82 PULMONARY EOSINOPHILIA, NOT ELSEWHERE CLASSIFIED: ICD-10-CM

## 2019-02-11 DIAGNOSIS — J70.9 RESPIRATORY CONDITIONS DUE TO UNSPECIFIED EXTERNAL AGENT: ICD-10-CM

## 2019-02-11 DIAGNOSIS — E87.5 HYPERKALEMIA: ICD-10-CM

## 2019-02-11 DIAGNOSIS — J68.0 BRONCHITIS AND PNEUMONITIS DUE TO CHEMICALS, GASES, FUMES AND VAPORS: ICD-10-CM

## 2019-02-11 DIAGNOSIS — F10.10 ALCOHOL ABUSE, UNCOMPLICATED: ICD-10-CM

## 2019-02-11 DIAGNOSIS — K50.00 CROHN'S DISEASE OF SMALL INTESTINE WITHOUT COMPLICATIONS: ICD-10-CM

## 2019-02-11 DIAGNOSIS — F41.1 GENERALIZED ANXIETY DISORDER: ICD-10-CM

## 2019-02-11 DIAGNOSIS — F17.210 NICOTINE DEPENDENCE, CIGARETTES, UNCOMPLICATED: ICD-10-CM

## 2019-02-11 DIAGNOSIS — G56.20 LESION OF ULNAR NERVE, UNSPECIFIED UPPER LIMB: ICD-10-CM

## 2019-02-11 LAB
ALBUMIN SERPL ELPH-MCNC: 3.8 G/DL — SIGNIFICANT CHANGE UP (ref 3.5–5.2)
ALP SERPL-CCNC: 72 U/L — SIGNIFICANT CHANGE UP (ref 30–115)
ALT FLD-CCNC: 26 U/L — SIGNIFICANT CHANGE UP (ref 0–41)
ANION GAP SERPL CALC-SCNC: 15 MMOL/L — HIGH (ref 7–14)
APTT BLD: 21.5 SEC — CRITICAL LOW (ref 27–39.2)
AST SERPL-CCNC: 60 U/L — HIGH (ref 0–41)
BASOPHILS # BLD AUTO: 0.09 K/UL — SIGNIFICANT CHANGE UP (ref 0–0.2)
BASOPHILS NFR BLD AUTO: 0.5 % — SIGNIFICANT CHANGE UP (ref 0–1)
BILIRUB SERPL-MCNC: <0.2 MG/DL — SIGNIFICANT CHANGE UP (ref 0.2–1.2)
BUN SERPL-MCNC: 11 MG/DL — SIGNIFICANT CHANGE UP (ref 10–20)
CALCIUM SERPL-MCNC: 8.9 MG/DL — SIGNIFICANT CHANGE UP (ref 8.5–10.1)
CHLORIDE SERPL-SCNC: 97 MMOL/L — LOW (ref 98–110)
CHOLEST SERPL-MCNC: 190 MG/DL — SIGNIFICANT CHANGE UP (ref 100–200)
CO2 SERPL-SCNC: 22 MMOL/L — SIGNIFICANT CHANGE UP (ref 17–32)
CREAT SERPL-MCNC: 0.7 MG/DL — SIGNIFICANT CHANGE UP (ref 0.7–1.5)
D DIMER BLD IA.RAPID-MCNC: 185 NG/ML DDU — SIGNIFICANT CHANGE UP (ref 0–230)
EOSINOPHIL # BLD AUTO: 3.5 K/UL — HIGH (ref 0–0.7)
EOSINOPHIL NFR BLD AUTO: 20.5 % — HIGH (ref 0–8)
GLUCOSE SERPL-MCNC: 69 MG/DL — LOW (ref 70–99)
HCG SERPL QL: NEGATIVE — SIGNIFICANT CHANGE UP
HCT VFR BLD CALC: 36 % — LOW (ref 37–47)
HDLC SERPL-MCNC: 31 MG/DL — LOW
HGB BLD-MCNC: 12.3 G/DL — SIGNIFICANT CHANGE UP (ref 12–16)
IMM GRANULOCYTES NFR BLD AUTO: 0.5 % — HIGH (ref 0.1–0.3)
INR BLD: 1.1 RATIO — SIGNIFICANT CHANGE UP (ref 0.65–1.3)
LIDOCAIN IGE QN: 27 U/L — SIGNIFICANT CHANGE UP (ref 7–60)
LIPID PNL WITH DIRECT LDL SERPL: 142 MG/DL — HIGH (ref 4–129)
LYMPHOCYTES # BLD AUTO: 21.7 % — SIGNIFICANT CHANGE UP (ref 20.5–51.1)
LYMPHOCYTES # BLD AUTO: 3.7 K/UL — HIGH (ref 1.2–3.4)
MCHC RBC-ENTMCNC: 31.6 PG — HIGH (ref 27–31)
MCHC RBC-ENTMCNC: 34.2 G/DL — SIGNIFICANT CHANGE UP (ref 32–37)
MCV RBC AUTO: 92.5 FL — SIGNIFICANT CHANGE UP (ref 81–99)
MONOCYTES # BLD AUTO: 0.49 K/UL — SIGNIFICANT CHANGE UP (ref 0.1–0.6)
MONOCYTES NFR BLD AUTO: 2.9 % — SIGNIFICANT CHANGE UP (ref 1.7–9.3)
NEUTROPHILS # BLD AUTO: 9.17 K/UL — HIGH (ref 1.4–6.5)
NEUTROPHILS NFR BLD AUTO: 53.9 % — SIGNIFICANT CHANGE UP (ref 42.2–75.2)
NRBC # BLD: 0 /100 WBCS — SIGNIFICANT CHANGE UP (ref 0–0)
PLATELET # BLD AUTO: 359 K/UL — SIGNIFICANT CHANGE UP (ref 130–400)
POTASSIUM SERPL-MCNC: 9.5 MMOL/L — CRITICAL HIGH (ref 3.5–5)
POTASSIUM SERPL-SCNC: 9.5 MMOL/L — CRITICAL HIGH (ref 3.5–5)
PROT SERPL-MCNC: 7.4 G/DL — SIGNIFICANT CHANGE UP (ref 6–8)
PROTHROM AB SERPL-ACNC: 12.6 SEC — SIGNIFICANT CHANGE UP (ref 9.95–12.87)
RBC # BLD: 3.89 M/UL — LOW (ref 4.2–5.4)
RBC # FLD: 12.7 % — SIGNIFICANT CHANGE UP (ref 11.5–14.5)
SODIUM SERPL-SCNC: 134 MMOL/L — LOW (ref 135–146)
TOTAL CHOLESTEROL/HDL RATIO MEASUREMENT: 6.1 RATIO — HIGH (ref 4–5.5)
TRIGL SERPL-MCNC: 131 MG/DL — SIGNIFICANT CHANGE UP (ref 10–149)
TROPONIN T SERPL-MCNC: <0.01 NG/ML — SIGNIFICANT CHANGE UP
WBC # BLD: 17.04 K/UL — HIGH (ref 4.8–10.8)
WBC # FLD AUTO: 17.04 K/UL — HIGH (ref 4.8–10.8)

## 2019-02-11 RX ORDER — SODIUM CHLORIDE 9 MG/ML
3 INJECTION INTRAMUSCULAR; INTRAVENOUS; SUBCUTANEOUS EVERY 8 HOURS
Qty: 0 | Refills: 0 | Status: DISCONTINUED | OUTPATIENT
Start: 2019-02-11 | End: 2019-02-14

## 2019-02-11 RX ORDER — ASPIRIN/CALCIUM CARB/MAGNESIUM 324 MG
162 TABLET ORAL ONCE
Qty: 0 | Refills: 0 | Status: COMPLETED | OUTPATIENT
Start: 2019-02-11 | End: 2019-02-11

## 2019-02-11 RX ORDER — IPRATROPIUM/ALBUTEROL SULFATE 18-103MCG
3 AEROSOL WITH ADAPTER (GRAM) INHALATION ONCE
Qty: 0 | Refills: 0 | Status: COMPLETED | OUTPATIENT
Start: 2019-02-11 | End: 2019-02-11

## 2019-02-11 RX ORDER — MORPHINE SULFATE 50 MG/1
4 CAPSULE, EXTENDED RELEASE ORAL ONCE
Qty: 0 | Refills: 0 | Status: DISCONTINUED | OUTPATIENT
Start: 2019-02-11 | End: 2019-02-11

## 2019-02-11 RX ADMIN — MORPHINE SULFATE 4 MILLIGRAM(S): 50 CAPSULE, EXTENDED RELEASE ORAL at 22:52

## 2019-02-11 RX ADMIN — Medication 3 MILLILITER(S): at 22:53

## 2019-02-11 RX ADMIN — MORPHINE SULFATE 4 MILLIGRAM(S): 50 CAPSULE, EXTENDED RELEASE ORAL at 22:56

## 2019-02-11 RX ADMIN — Medication 162 MILLIGRAM(S): at 22:52

## 2019-02-11 RX ADMIN — SODIUM CHLORIDE 3 MILLILITER(S): 9 INJECTION INTRAMUSCULAR; INTRAVENOUS; SUBCUTANEOUS at 22:53

## 2019-02-11 NOTE — ED ADULT NURSE NOTE - NSIMPLEMENTINTERV_GEN_ALL_ED
Implemented All Universal Safety Interventions:  [place] to call system. Call bell, personal items and telephone within reach. Instruct patient to call for assistance. Room bathroom lighting operational. Non-slip footwear when patient is off stretcher. Physically safe environment: no spills, clutter or unnecessary equipment. Stretcher in lowest position, wheels locked, appropriate side rails in place.

## 2019-02-11 NOTE — ED PROVIDER NOTE - CARE PLAN
Principal Discharge DX:	Pneumonia of both lungs due to infectious organism, unspecified part of lung  Secondary Diagnosis:	Chest pain, unspecified type

## 2019-02-11 NOTE — ED PROVIDER NOTE - OBJECTIVE STATEMENT
38 yo female with pMH of "spinal cord damage", Crohn's, asthma, herniated disc, anxiety presents to the ER for left sided CP PTA. PT states it occurred at 1600 and then again PTA. Pt feels sharp pain, left chest, tingling down arms bilaterally. Denies any fever, +cough with phlegm, no N/V/D/palpitations/dizziness/HA/neck pain/ Pt states recent URI and has some hoarseness to her voice. Pt has no leg pain or swelling, no tingling down the legs. Pt poor historian and states her meds are all in computer, and she just needs something for pain.     PMH as above  Meds: Symbicort, albuterol, flexeril, oxycodone, meloxicam, duloxetine, gabapentin  ALL: nkda  SH: +smoker, +etoh  LMP Nov, irregular  PMD Mir

## 2019-02-11 NOTE — ED PROVIDER NOTE - PMH
Called pharmacy 9/26/18. Was not aware of sildenafil. I would recommend holding sildenafil usage until he meets with cardiology. Will send my chart message.    Guillermina Issa MD FAAN  Department of Neurology  Pager [phone]         Anxiety    Asthma

## 2019-02-11 NOTE — ED PROVIDER NOTE - PHYSICAL EXAMINATION
VITAL SIGNS: I have reviewed nursing notes and confirm.  CONSTITUTIONAL: Well-developed; well-nourished; in mod acute distress, crying for pain meds.  SKIN: Skin exam is warm and dry, no acute rash.  HEAD: Normocephalic; atraumatic.  EYES: PERRL, EOM intact; conjunctiva and sclera clear.  ENT: No nasal discharge; airway clear. TMs clear.  NECK: Supple; non tender.  CARD: S1, S2 normal; no murmurs, gallops, or rubs. Regular rate and rhythm. +chest wall tenderness to left chest  RESP: No wheezes, rales or rhonchi.  ABD: Normal bowel sounds; soft; non-distended; non-tender; no hepatosplenomegaly.  EXT: Normal ROM. No clubbing, cyanosis or edema.  LYMPH: No acute cervical adenopathy.  NEURO: Alert, oriented. Grossly unremarkable. No focal deficits.  PSYCH: Cooperative, anxious.

## 2019-02-11 NOTE — ED PROVIDER NOTE - MEDICAL DECISION MAKING DETAILS
40 yo female presents to the ER for left side CP, cough. +PNA on xray. Labs reviewed. Given bilateral nature of the PNA will admit.

## 2019-02-12 LAB
ANION GAP SERPL CALC-SCNC: 17 MMOL/L — HIGH (ref 7–14)
BASOPHILS # BLD AUTO: 0.07 K/UL — SIGNIFICANT CHANGE UP (ref 0–0.2)
BASOPHILS NFR BLD AUTO: 0.7 % — SIGNIFICANT CHANGE UP (ref 0–1)
BUN SERPL-MCNC: 9 MG/DL — LOW (ref 10–20)
CA-I SERPL-SCNC: 1.16 MMOL/L — SIGNIFICANT CHANGE UP (ref 1.12–1.3)
CALCIUM SERPL-MCNC: 9.5 MG/DL — SIGNIFICANT CHANGE UP (ref 8.5–10.1)
CHLORIDE SERPL-SCNC: 98 MMOL/L — SIGNIFICANT CHANGE UP (ref 98–110)
CK MB CFR SERPL CALC: 3.9 NG/ML — SIGNIFICANT CHANGE UP (ref 0.6–6.3)
CK SERPL-CCNC: 213 U/L — SIGNIFICANT CHANGE UP (ref 0–225)
CO2 SERPL-SCNC: 21 MMOL/L — SIGNIFICANT CHANGE UP (ref 17–32)
CREAT SERPL-MCNC: 0.6 MG/DL — LOW (ref 0.7–1.5)
EOSINOPHIL # BLD AUTO: 3.37 K/UL — HIGH (ref 0–0.7)
EOSINOPHIL NFR BLD AUTO: 33.2 % — HIGH (ref 0–8)
ERYTHROCYTE [SEDIMENTATION RATE] IN BLOOD: 54 MM/HR — HIGH (ref 0–15)
GAS PNL BLDV: 136 MMOL/L — SIGNIFICANT CHANGE UP (ref 136–145)
GAS PNL BLDV: SIGNIFICANT CHANGE UP
GLUCOSE SERPL-MCNC: 104 MG/DL — HIGH (ref 70–99)
HCO3 BLDV-SCNC: 24 MMOL/L — SIGNIFICANT CHANGE UP (ref 22–29)
HCT VFR BLD CALC: 38.4 % — SIGNIFICANT CHANGE UP (ref 37–47)
HCT VFR BLDA CALC: 39 % — SIGNIFICANT CHANGE UP (ref 34–44)
HGB BLD CALC-MCNC: 12.7 G/DL — LOW (ref 14–18)
HGB BLD-MCNC: 13.3 G/DL — SIGNIFICANT CHANGE UP (ref 12–16)
IMM GRANULOCYTES NFR BLD AUTO: 0.4 % — HIGH (ref 0.1–0.3)
LACTATE BLDV-MCNC: 1.3 MMOL/L — SIGNIFICANT CHANGE UP (ref 0.5–1.6)
LDH SERPL L TO P-CCNC: 379 — HIGH (ref 50–242)
LYMPHOCYTES # BLD AUTO: 2.87 K/UL — SIGNIFICANT CHANGE UP (ref 1.2–3.4)
LYMPHOCYTES # BLD AUTO: 28.2 % — SIGNIFICANT CHANGE UP (ref 20.5–51.1)
MAGNESIUM SERPL-MCNC: 2.2 MG/DL — SIGNIFICANT CHANGE UP (ref 1.8–2.4)
MCHC RBC-ENTMCNC: 32.1 PG — HIGH (ref 27–31)
MCHC RBC-ENTMCNC: 34.6 G/DL — SIGNIFICANT CHANGE UP (ref 32–37)
MCV RBC AUTO: 92.8 FL — SIGNIFICANT CHANGE UP (ref 81–99)
MONOCYTES # BLD AUTO: 0.38 K/UL — SIGNIFICANT CHANGE UP (ref 0.1–0.6)
MONOCYTES NFR BLD AUTO: 3.7 % — SIGNIFICANT CHANGE UP (ref 1.7–9.3)
NEUTROPHILS # BLD AUTO: 3.43 K/UL — SIGNIFICANT CHANGE UP (ref 1.4–6.5)
NEUTROPHILS NFR BLD AUTO: 33.8 % — LOW (ref 42.2–75.2)
NRBC # BLD: 0 /100 WBCS — SIGNIFICANT CHANGE UP (ref 0–0)
PCO2 BLDV: 37 MMHG — LOW (ref 41–51)
PH BLDV: 7.42 — SIGNIFICANT CHANGE UP (ref 7.26–7.43)
PLATELET # BLD AUTO: 416 K/UL — HIGH (ref 130–400)
PO2 BLDV: 88 MMHG — HIGH (ref 20–40)
POTASSIUM BLDV-SCNC: 3.7 MMOL/L — SIGNIFICANT CHANGE UP (ref 3.3–5.6)
POTASSIUM SERPL-MCNC: 5.1 MMOL/L — HIGH (ref 3.5–5)
POTASSIUM SERPL-SCNC: 5.1 MMOL/L — HIGH (ref 3.5–5)
RBC # BLD: 4.14 M/UL — LOW (ref 4.2–5.4)
RBC # FLD: 12.6 % — SIGNIFICANT CHANGE UP (ref 11.5–14.5)
SAO2 % BLDV: 98 % — SIGNIFICANT CHANGE UP
SODIUM SERPL-SCNC: 136 MMOL/L — SIGNIFICANT CHANGE UP (ref 135–146)
TROPONIN T SERPL-MCNC: <0.01 NG/ML — SIGNIFICANT CHANGE UP
WBC # BLD: 10.16 K/UL — SIGNIFICANT CHANGE UP (ref 4.8–10.8)
WBC # FLD AUTO: 10.16 K/UL — SIGNIFICANT CHANGE UP (ref 4.8–10.8)

## 2019-02-12 RX ORDER — GABAPENTIN 400 MG/1
300 CAPSULE ORAL
Qty: 0 | Refills: 0 | Status: DISCONTINUED | OUTPATIENT
Start: 2019-02-12 | End: 2019-02-14

## 2019-02-12 RX ORDER — ALPRAZOLAM 0.25 MG
1 TABLET ORAL DAILY
Qty: 0 | Refills: 0 | Status: DISCONTINUED | OUTPATIENT
Start: 2019-02-12 | End: 2019-02-14

## 2019-02-12 RX ORDER — AZITHROMYCIN 500 MG/1
500 TABLET, FILM COATED ORAL ONCE
Qty: 0 | Refills: 0 | Status: COMPLETED | OUTPATIENT
Start: 2019-02-12 | End: 2019-02-12

## 2019-02-12 RX ORDER — ACETAMINOPHEN 500 MG
650 TABLET ORAL EVERY 6 HOURS
Qty: 0 | Refills: 0 | Status: DISCONTINUED | OUTPATIENT
Start: 2019-02-12 | End: 2019-02-14

## 2019-02-12 RX ORDER — OXYCODONE AND ACETAMINOPHEN 5; 325 MG/1; MG/1
1 TABLET ORAL ONCE
Qty: 0 | Refills: 0 | Status: DISCONTINUED | OUTPATIENT
Start: 2019-02-12 | End: 2019-02-12

## 2019-02-12 RX ORDER — BUDESONIDE AND FORMOTEROL FUMARATE DIHYDRATE 160; 4.5 UG/1; UG/1
0 AEROSOL RESPIRATORY (INHALATION)
Qty: 10 | Refills: 0 | COMMUNITY

## 2019-02-12 RX ORDER — ENOXAPARIN SODIUM 100 MG/ML
40 INJECTION SUBCUTANEOUS EVERY 24 HOURS
Qty: 0 | Refills: 0 | Status: DISCONTINUED | OUTPATIENT
Start: 2019-02-12 | End: 2019-02-14

## 2019-02-12 RX ORDER — TIZANIDINE 4 MG/1
0 TABLET ORAL
Qty: 12 | Refills: 0 | COMMUNITY

## 2019-02-12 RX ORDER — BUDESONIDE AND FORMOTEROL FUMARATE DIHYDRATE 160; 4.5 UG/1; UG/1
2 AEROSOL RESPIRATORY (INHALATION)
Qty: 0 | Refills: 0 | Status: DISCONTINUED | OUTPATIENT
Start: 2019-02-12 | End: 2019-02-14

## 2019-02-12 RX ORDER — CEFTRIAXONE 500 MG/1
1 INJECTION, POWDER, FOR SOLUTION INTRAMUSCULAR; INTRAVENOUS EVERY 24 HOURS
Qty: 0 | Refills: 0 | Status: DISCONTINUED | OUTPATIENT
Start: 2019-02-12 | End: 2019-02-12

## 2019-02-12 RX ORDER — DULOXETINE HYDROCHLORIDE 30 MG/1
60 CAPSULE, DELAYED RELEASE ORAL DAILY
Qty: 0 | Refills: 0 | Status: DISCONTINUED | OUTPATIENT
Start: 2019-02-12 | End: 2019-02-14

## 2019-02-12 RX ORDER — ALBUTEROL 90 UG/1
2 AEROSOL, METERED ORAL EVERY 6 HOURS
Qty: 0 | Refills: 0 | Status: DISCONTINUED | OUTPATIENT
Start: 2019-02-12 | End: 2019-02-14

## 2019-02-12 RX ADMIN — OXYCODONE AND ACETAMINOPHEN 1 TABLET(S): 5; 325 TABLET ORAL at 04:28

## 2019-02-12 RX ADMIN — GABAPENTIN 300 MILLIGRAM(S): 400 CAPSULE ORAL at 04:28

## 2019-02-12 RX ADMIN — SODIUM CHLORIDE 3 MILLILITER(S): 9 INJECTION INTRAMUSCULAR; INTRAVENOUS; SUBCUTANEOUS at 05:39

## 2019-02-12 RX ADMIN — ENOXAPARIN SODIUM 40 MILLIGRAM(S): 100 INJECTION SUBCUTANEOUS at 05:48

## 2019-02-12 RX ADMIN — AZITHROMYCIN 255 MILLIGRAM(S): 500 TABLET, FILM COATED ORAL at 03:00

## 2019-02-12 RX ADMIN — Medication 60 MILLIGRAM(S): at 17:30

## 2019-02-12 RX ADMIN — CEFTRIAXONE 100 GRAM(S): 500 INJECTION, POWDER, FOR SOLUTION INTRAMUSCULAR; INTRAVENOUS at 02:15

## 2019-02-12 RX ADMIN — GABAPENTIN 300 MILLIGRAM(S): 400 CAPSULE ORAL at 17:30

## 2019-02-12 RX ADMIN — SODIUM CHLORIDE 3 MILLILITER(S): 9 INJECTION INTRAMUSCULAR; INTRAVENOUS; SUBCUTANEOUS at 21:33

## 2019-02-12 RX ADMIN — SODIUM CHLORIDE 3 MILLILITER(S): 9 INJECTION INTRAMUSCULAR; INTRAVENOUS; SUBCUTANEOUS at 15:27

## 2019-02-12 RX ADMIN — OXYCODONE AND ACETAMINOPHEN 1 TABLET(S): 5; 325 TABLET ORAL at 04:58

## 2019-02-12 RX ADMIN — ALBUTEROL 2 PUFF(S): 90 AEROSOL, METERED ORAL at 11:44

## 2019-02-12 RX ADMIN — Medication 110 MILLIGRAM(S): at 17:30

## 2019-02-12 RX ADMIN — DULOXETINE HYDROCHLORIDE 60 MILLIGRAM(S): 30 CAPSULE, DELAYED RELEASE ORAL at 17:30

## 2019-02-12 RX ADMIN — BUDESONIDE AND FORMOTEROL FUMARATE DIHYDRATE 2 PUFF(S): 160; 4.5 AEROSOL RESPIRATORY (INHALATION) at 21:00

## 2019-02-12 NOTE — H&P ADULT - ASSESSMENT
39 year old female with known hx of Crohn's dx (not on Tx), asthma, anxiety, and 'cervical spinal cord damage'? presents to the ED for left sided chest pain with associated shortness of breath. Admitted for multifocal pneumonia.      1. Shortness of breath and chest pains likely secondary to multifocal pneumonia r/o endocarditis  - CXR concern for multifocal pneumonia  - ER gave azithromycin and ceftriaxone, continue these medications for now  - Check blood cultures, repeat CXR in am, check sputum culture  - Check TTE to rule out endocarditis as a cause of symptoms leading to septic emboli in the lungs  - Trend cardiac enzymes  - Pulm eval  - Possible concern for drug seeking behavior..    2. Asthma, continue current meds. Stable for now    3. Anxiety- continue home xanax    4. VTE prophylaxis - subq lovenox    FULL CODE 39 year old female with known hx of Crohn's dx (not on Tx), asthma, anxiety, and 'cervical spinal cord damage'? presents to the ED for left sided chest pain with associated shortness of breath. Admitted for multifocal pneumonia.      1. Shortness of breath and chest pains likely secondary to multifocal pneumonia r/o endocarditis  - CXR concern for multifocal pneumonia  - ER gave azithromycin and ceftriaxone, start levaquin for additional atypical coverage  - Check blood cultures, repeat CXR in am, check sputum culture  - Check TTE to rule out endocarditis as a cause of symptoms leading to septic emboli in the lungs  - Trend cardiac enzymes  - Pulm eval  - Possible concern for drug seeking behavior..    2. Asthma, continue current meds. Stable for now    3. Anxiety- continue home xanax    4. VTE prophylaxis - subq lovenox    FULL CODE 39 year old female with known hx of Crohn's dx (not on Tx), asthma, anxiety, and 'cervical spinal cord damage'? presents to the ED for left sided chest pain with associated shortness of breath. Admitted for multifocal pneumonia.      1. Shortness of breath and chest pains likely secondary to ACUTE EOSINOPHILIC PNEUMONIA (in the setting of asthma/eosinophilia/radiographic and clinical presentation)  - CXR reviewed  - ER gave azithromycin and ceftriaxone, start levaquin for additional atypical coverage; currently switched to DOXY intravenously.  - Check blood cultures, repeat CXR in am, check sputum culture  - Check TTE to rule out pericardial complications (chest pain)  - Trend cardiac enzymes (extremely unlikely to be primary ischemic cardiac etiology)  - Pulm eval      2. Asthma, uncontrolled over the past week (significantly more use of rescue inh on top of maintenance ICS).    3. Anxiety- continue home xanax    4. VTE prophylaxis - subq lovenox    FULL CODE

## 2019-02-12 NOTE — H&P ADULT - NSHPSOCIALHISTORY_GEN_ALL_CORE
Substance Use:  (x)Denies   ()Former User   ()Active User, Substance(s)? Last Use?  Tobacco Usage:  ()Never Smoker   ()Former Smoker   (x)Active Smoker, Pack Years ? 40  Alcohol Usage:  ()Never Drinker   (x)Social Drinker   ()Former Abuser   ()Active Abuser, Type?  Amount? How Often? Last Drink?    Home Living Situation: Lives with sister Substance Use:  (x)Denies   ()Former User   ()Active User, Substance(s)? Last Use?  Tobacco Usage:  ()Never Smoker   ()Former Smoker   (x)Active Smoker, Pack Years ? 40  Alcohol Usage:  ()Never Drinker   (x)Social Drinker   ()Former Abuser   ()Active Abuser, Type?  Amount? How Often? Last Drink?    Home Living Situation: Lives with sister    has a pet (parrot) at home, not recently acquired.  Did not recently move or relocate

## 2019-02-12 NOTE — H&P ADULT - NSHPPHYSICALEXAM_GEN_ALL_CORE
Constitutional: Well developed, well nourished. NAD. Good general hygiene  Head: Atraumatic.  Eyes: PERRLA. EOMI without discomfort.   ENT: No nasal discharge. Mucous membranes moist.  Neck: Supple. Painless ROM.  Cardiovascular: Regular rhythm. Regular rate. Normal S1 and S2. No murmurs. 2+ pulses in all extremities.   Pulmonary: Normal respiratory rate and effort. Decreased air entry bilaterally, coarse breath sounds  Abdominal: Soft. Nondistended. Nontender. No rebound or guarding.   Extremities: Pelvis stable. No lower extremity edema. Symmetric calves.  Skin: No rashes.   Neuro: AAOx3. No focal neurological deficits.  Psych: Normal mood. Normal affect.

## 2019-02-12 NOTE — CONSULT NOTE ADULT - ASSESSMENT
IMPRESSION:    Multilobal PNA community acquired   HO asthma    RECOMMENDATIONS:    Continue with IV abx  Check urine strep and legionella  Consider checking HIV status  Solumedrol 60mg IV q8  CT chest   Needs OP follow up for PFTs when medically stable IMPRESSION:    Multilobal PNA community acquired   HO asthma    RECOMMENDATIONS:    Continue with IV abx  Check urine strep and legionella  Consider checking HIV status  Solumedrol 60mg IV q8  Nebs q4 and PRN  Keep SpO2>92%  Needs OP follow up for PFTs when medically stable IMPRESSION:    Asthma exacerbation   Smoker  Pneumonia .  RO opportunistic infections VS pneumonitis or toxic lung injury       RECOMMENDATIONS:    Continue with IV abx  Check urine strep and legionella.  Mycoplasma titters and repeat in 6 weeks   HIV status  LDH. Fungitell   Solumedrol 60mg IV q8  Nebs q4 and PRN  Smoking cessation   Basic rheum and vasculitis panel   DVT prophylaxis   Keep SpO2>92%  Needs OP follow up for PFTs when medically stable IMPRESSION:    Asthma exacerbation improving  Smoker  Pneumonia .  RO opportunistic infections VS pneumonitis or toxic lung injury       RECOMMENDATIONS:    Continue with IV abx  Check urine strep and legionella.  Mycoplasma titters and repeat in 6 weeks   HIV status  LDH. Fungitell   Solumedrol 60mg IV 24  Nebs q4 and PRN  Smoking cessation   Basic rheum and vasculitis panel, IgE levels, Stools for ova and parasites.   UDS SDS   PFT   We will assess in am if stable for BAL   DVT prophylaxis   Keep SpO2>92%  Needs OP follow up for PFTs when medically stable

## 2019-02-12 NOTE — H&P ADULT - NSTOBACCOMEDNCS_GEN_A_CORE_ALL
Other (Free Text): 3 view X-ray of bilateral feet and hands, 2 view X-ray of left knee, ankles, wrists\\n\\nTender to palpation in hands, feet, left knee, left achilles Offered and patient declined

## 2019-02-12 NOTE — CONSULT NOTE ADULT - ASSESSMENT
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  Allergen possibly the insecticide that she used  No evidence of a bacterial PNA  Psittacosis in differential ( given that she has 2 parakeets the incubation period/ presentation is quite different and generally no eosinophils )    RECOMMENDATIONS:  Steroids  Doxycycline 100 mg iv q12h for now  D/c other ABx

## 2019-02-12 NOTE — H&P ADULT - ATTENDING COMMENTS
Patient is seen and examined independently. I agree with resident note above and plan of care -edited and corrected where applicable- with addition:   1. Strongly suspected acute eosinophilic pneumonia in the setting of asthma, fine reticular opacities on CXR, eosinophilia. Due to its acuity, much less likely in favor of rheumatologic/vasculitic process at this time.     ...Will on high-dose steroids and slow taper (until Follow up with pulm as outpatient)   ... Doxy for now  ... Check IgE/ BCx (very low yield)  ... in setting of significant parietal chest pain, agree with Echo  ... Needs full investigative w/u upon Follow up with pulm as outpatient; in case of persistence, may need rheum w/u +/- bronchial sampling    Case discussed with resident assigned.   Case discussed with ID and Pulm teams Patient is seen and examined independently. I agree with resident note above and plan of care -edited and corrected where applicable- with addition:   1. Strongly suspected acute eosinophilic pneumonia in the setting of asthma, fine reticular opacities on CXR, eosinophilia. Due to its acuity, much less likely in favor of rheumatologic/vasculitic process at this time.     ...Will be on systematic steroids and slow taper (until Follow up with pulm as outpatient)   ... Doxy for now  ... Check IgE/ BCx (very low yield)  ... in setting of significant parietal chest pain, agree with Echo  ... Needs full investigative w/u upon Follow up with pulm as outpatient; in case of persistence, may need rheum w/u +/- bronchial sampling    Case discussed with resident assigned.   Case discussed with ID and Pulm teams

## 2019-02-12 NOTE — CONSULT NOTE ADULT - SUBJECTIVE AND OBJECTIVE BOX
Patient is a 39y old  Female who presents with a chief complaint of Multifocal Pneumonia (12 Feb 2019 03:30)      HPI:  39 year old female with known hx of Crohn's dx (not on Tx), asthma, anxiety, and 'cervical spinal cord damage'? presents to the ED for left sided chest pain with associated shortness of breath. As per the patient the symptoms have been occurring intermittently, but with increasing frequency over the past few days. She has also had associated cough, productive of whitish phlegm. Denies sicks contacts, fevers, chills, palpitations, GI, or  complaints. Patient is a poor historian and states her meds are all in computer, and she just needs something for pain. (12 Feb 2019 03:30)      PAST MEDICAL & SURGICAL HISTORY:  Crohn's disease without complication, unspecified gastrointestinal tract location  Anxiety  Asthma  No significant past surgical history      SOCIAL HX:   20py smoking history. No EtOH or drug use    FAMILY HISTORY:  No pertinent family history in first degree relatives  .  No cardiovascular or pulmonary family history     Review of System:  See HPI    Allergies    No Known Allergies    PHYSICAL EXAM  Vital Signs Last 24 Hrs  T(C): 36.1 (12 Feb 2019 05:49), Max: 36.1 (12 Feb 2019 05:49)  T(F): 96.9 (12 Feb 2019 05:49), Max: 96.9 (12 Feb 2019 05:49)  HR: 77 (12 Feb 2019 05:49) (77 - 113)  BP: 116/63 (12 Feb 2019 05:49) (116/63 - 127/58)  RR: 19 (12 Feb 2019 05:49) (18 - 19)  SpO2: 98% on RA  (12 Feb 2019 05:49) (96% - 98%)    General: In NAD  HEENT: MARION             Lymphatic system: No cervical LN     Lungs: Bilateral expiratory wheezing.   Cardiovascular: Regular  Gastrointestinal: Soft.  + BS   Musculoskeletal: No Clubbing.  Full range of motion.. Moves all extremities  Skin: Warm.  Intact  Neurological: No motor or sensory deficit       LABS:                          13.3   10.16 )-----------( 416      ( 12 Feb 2019 12:22 )             38.4                                               02-12    136  |  98  |  9<L>  ----------------------------<  104<H>  5.1<H>   |  21  |  0.6<L>    Ca    9.5      12 Feb 2019 12:22  Mg     2.2     02-12    TPro  7.4  /  Alb  3.8  /  TBili  <0.2  /  DBili  x   /  AST  60<H>  /  ALT  26  /  AlkPhos  72  02-11      PT/INR - ( 11 Feb 2019 22:40 )   PT: 12.60 sec;   INR: 1.10 ratio         PTT - ( 11 Feb 2019 22:40 )  PTT:21.5 sec                                           CARDIAC MARKERS ( 12 Feb 2019 12:22 )  x     / <0.01 ng/mL / 213 U/L / x     / 3.9 ng/mL  CARDIAC MARKERS ( 11 Feb 2019 22:40 )  x     / <0.01 ng/mL / x     / x     / x                                                LIVER FUNCTIONS - ( 11 Feb 2019 22:40 )  Alb: 3.8 g/dL / Pro: 7.4 g/dL / ALK PHOS: 72 U/L / ALT: 26 U/L / AST: 60 U/L / GGT: x                                                                                                MEDICATIONS  (STANDING):  buDESOnide 160 MICROgram(s)/formoterol 4.5 MICROgram(s) Inhaler 2 Puff(s) Inhalation two times a day  cefTRIAXone   IVPB 1 Gram(s) IV Intermittent every 24 hours  DULoxetine 60 milliGRAM(s) Oral daily  enoxaparin Injectable 40 milliGRAM(s) SubCutaneous every 24 hours  gabapentin 300 milliGRAM(s) Oral two times a day  levoFLOXacin IVPB 750 milliGRAM(s) IV Intermittent every 24 hours  sodium chloride 0.9% lock flush 3 milliLiter(s) IV Push every 8 hours    MEDICATIONS  (PRN):  acetaminophen   Tablet .. 650 milliGRAM(s) Oral every 6 hours PRN Temp greater or equal to 38C (100.4F), Moderate Pain (4 - 6)  ALBUTerol    90 MICROgram(s) HFA Inhaler 2 Puff(s) Inhalation every 6 hours PRN Shortness of Breath and/or Wheezing  ALPRAZolam 1 milliGRAM(s) Oral daily PRN Anxiety      < from: Xray Chest 2 Views PA/Lat (02.11.19 @ 23:49) >  Impression:      Since November 20, 2018, there are new bilateral mid lung field   opacities, right greater than left.   Findings may reflect multifocal pneumonia in the appropriate clinical   setting. Follow-up to demonstrate resolution is recommended.    < end of copied text >
CRYS WHITEA  39y, Female  Allergy: No Known Allergies      HPI:  39 year old female with known hx of Crohn's dx (not on Tx), asthma, anxiety, and 'cervical spinal cord damage'? presents to the ED for acute onset of left sided chest pain with associated shortness of breath. Pain woke pt up from her sleep. As per the patient the symptoms have been occurring intermittently, but with increasing frequency over the past few days. She has also had associated cough, productive of whitish phlegm. Denies sicks contacts, fevers, chills, palpitations, GI, or  complaints.   No travels. Smoker. Has 2 parakeets who are not sick. Has a dog/ cat  Stays home on disability  Used a insecticide 2 days before onset of her pulmonary symptoms at home.    FAMILY HISTORY:  No pertinent family history in first degree relatives    PAST MEDICAL & SURGICAL HISTORY:  Crohn's disease without complication, unspecified gastrointestinal tract location  Anxiety  Asthma  No significant past surgical history        VITALS:  T(F): 96.9, Max: 96.9 (02-12-19 @ 05:49)  HR: 77  BP: 116/63  RR: 19Vital Signs Last 24 Hrs  T(C): 36.1 (12 Feb 2019 05:49), Max: 36.1 (12 Feb 2019 05:49)  T(F): 96.9 (12 Feb 2019 05:49), Max: 96.9 (12 Feb 2019 05:49)  HR: 77 (12 Feb 2019 05:49) (77 - 113)  BP: 116/63 (12 Feb 2019 05:49) (116/63 - 127/58)  BP(mean): --  RR: 19 (12 Feb 2019 05:49) (18 - 19)  SpO2: 98% (12 Feb 2019 05:49) (96% - 98%)    TESTS & MEASUREMENTS:                        13.3   10.16 )-----------( 416      ( 12 Feb 2019 12:22 )             38.4     02-12    136  |  98  |  9<L>  ----------------------------<  104<H>  5.1<H>   |  21  |  0.6<L>    Ca    9.5      12 Feb 2019 12:22  Mg     2.2     02-12    TPro  7.4  /  Alb  3.8  /  TBili  <0.2  /  DBili  x   /  AST  60<H>  /  ALT  26  /  AlkPhos  72  02-11    LIVER FUNCTIONS - ( 11 Feb 2019 22:40 )  Alb: 3.8 g/dL / Pro: 7.4 g/dL / ALK PHOS: 72 U/L / ALT: 26 U/L / AST: 60 U/L / GGT: x                   RADIOLOGY & ADDITIONAL TESTS:    ANTIBIOTICS:  cefTRIAXone   IVPB 1 Gram(s) IV Intermittent every 24 hours  levoFLOXacin IVPB 750 milliGRAM(s) IV Intermittent every 24 hours

## 2019-02-12 NOTE — H&P ADULT - NSHPLABSRESULTS_GEN_ALL_CORE
12.3   17.04 )-----------( 359      ( 11 Feb 2019 23:15 )             36.0       02-11    134<L>  |  97<L>  |  11  ----------------------------<  69<L>  9.5<HH>   |  22  |  0.7    HEMOLYZED LABS    Ca    8.9      11 Feb 2019 22:40    TPro  7.4  /  Alb  3.8  /  TBili  <0.2  /  DBili  x   /  AST  60<H>  /  ALT  26  /  AlkPhos  72  02-11    PT/INR - ( 11 Feb 2019 22:40 )   PT: 12.60 sec;   INR: 1.10 ratio    PTT - ( 11 Feb 2019 22:40 )  PTT:21.5 sec    CARDIAC MARKERS ( 11 Feb 2019 22:40 )  x     / <0.01 ng/mL / x     / x     / x

## 2019-02-12 NOTE — H&P ADULT - NSHPREVIEWOFSYSTEMS_GEN_ALL_CORE
Constitutional: No fever or chills. Normal appetite. No unintended weight loss.   Eyes: No vision changes.  ENT: No hearing changes. No ear pain. No sore throat.  Neck: No neck pain or stiffness.  Cardiovascular: SEE HPI  Pulmonary: SEE HPI  Abdominal: No abdominal pain, nausea, vomiting, or diarrhea.   : No dysuria or frequency. No hematuria.   Neuro: No headache, syncope, or dizziness.  MS: No back pain. No calf pain/swelling.  Psych: No suicidal or homicidal ideations.

## 2019-02-12 NOTE — H&P ADULT - PMH
Anxiety    Asthma    Crohn's disease without complication, unspecified gastrointestinal tract location

## 2019-02-12 NOTE — H&P ADULT - HISTORY OF PRESENT ILLNESS
39 year old female with known hx of Crohn's dx (not on Tx), asthma, anxiety, and 'cervical spinal cord damage'? presents to the ED for left sided chest pain with associated shortness of breath. As per the patient the symptoms have been occurring intermittently, but with increasing frequency over the past few days. She has also had associated cough, productive of whitish phlegm. Denies sicks contacts, fevers, chills, palpitations, GI, or  complaints. Patient is a poor historian and states her meds are all in computer, and she just needs something for pain.

## 2019-02-13 DIAGNOSIS — Z71.89 OTHER SPECIFIED COUNSELING: ICD-10-CM

## 2019-02-13 LAB
ALBUMIN SERPL ELPH-MCNC: 4.1 G/DL — SIGNIFICANT CHANGE UP (ref 3.5–5.2)
ALP SERPL-CCNC: 78 U/L — SIGNIFICANT CHANGE UP (ref 30–115)
ALT FLD-CCNC: 18 U/L — SIGNIFICANT CHANGE UP (ref 0–41)
ANION GAP SERPL CALC-SCNC: 23 MMOL/L — HIGH (ref 7–14)
AST SERPL-CCNC: 10 U/L — SIGNIFICANT CHANGE UP (ref 0–41)
BASOPHILS # BLD AUTO: 0.04 K/UL — SIGNIFICANT CHANGE UP (ref 0–0.2)
BASOPHILS NFR BLD AUTO: 0.2 % — SIGNIFICANT CHANGE UP (ref 0–1)
BILIRUB SERPL-MCNC: <0.2 MG/DL — SIGNIFICANT CHANGE UP (ref 0.2–1.2)
BUN SERPL-MCNC: 24 MG/DL — HIGH (ref 10–20)
CALCIUM SERPL-MCNC: 9.5 MG/DL — SIGNIFICANT CHANGE UP (ref 8.5–10.1)
CHLORIDE SERPL-SCNC: 96 MMOL/L — LOW (ref 98–110)
CO2 SERPL-SCNC: 19 MMOL/L — SIGNIFICANT CHANGE UP (ref 17–32)
CREAT SERPL-MCNC: 0.9 MG/DL — SIGNIFICANT CHANGE UP (ref 0.7–1.5)
CRP SERPL-MCNC: 6.01 MG/DL — HIGH (ref 0–0.4)
EOSINOPHIL # BLD AUTO: 0.09 K/UL — SIGNIFICANT CHANGE UP (ref 0–0.7)
EOSINOPHIL NFR BLD AUTO: 0.5 % — SIGNIFICANT CHANGE UP (ref 0–8)
GLUCOSE SERPL-MCNC: 124 MG/DL — HIGH (ref 70–99)
HCT VFR BLD CALC: 41.3 % — SIGNIFICANT CHANGE UP (ref 37–47)
HGB BLD-MCNC: 13.6 G/DL — SIGNIFICANT CHANGE UP (ref 12–16)
HIV 1+2 AB+HIV1 P24 AG SERPL QL IA: SIGNIFICANT CHANGE UP
IGE SERPL-ACNC: 3158 IU/ML — HIGH (ref 0–100)
IGE SERPL-ACNC: 3203 IU/ML — HIGH (ref 0–100)
IMM GRANULOCYTES NFR BLD AUTO: 0.6 % — HIGH (ref 0.1–0.3)
LYMPHOCYTES # BLD AUTO: 17.7 % — LOW (ref 20.5–51.1)
LYMPHOCYTES # BLD AUTO: 2.95 K/UL — SIGNIFICANT CHANGE UP (ref 1.2–3.4)
MCHC RBC-ENTMCNC: 31.1 PG — HIGH (ref 27–31)
MCHC RBC-ENTMCNC: 32.9 G/DL — SIGNIFICANT CHANGE UP (ref 32–37)
MCV RBC AUTO: 94.5 FL — SIGNIFICANT CHANGE UP (ref 81–99)
MONOCYTES # BLD AUTO: 0.61 K/UL — HIGH (ref 0.1–0.6)
MONOCYTES NFR BLD AUTO: 3.7 % — SIGNIFICANT CHANGE UP (ref 1.7–9.3)
NEUTROPHILS # BLD AUTO: 12.91 K/UL — HIGH (ref 1.4–6.5)
NEUTROPHILS NFR BLD AUTO: 77.3 % — HIGH (ref 42.2–75.2)
NRBC # BLD: 0 /100 WBCS — SIGNIFICANT CHANGE UP (ref 0–0)
PLATELET # BLD AUTO: 508 K/UL — HIGH (ref 130–400)
POTASSIUM SERPL-MCNC: 4.3 MMOL/L — SIGNIFICANT CHANGE UP (ref 3.5–5)
POTASSIUM SERPL-SCNC: 4.3 MMOL/L — SIGNIFICANT CHANGE UP (ref 3.5–5)
PROT SERPL-MCNC: 7 G/DL — SIGNIFICANT CHANGE UP (ref 6–8)
RBC # BLD: 4.37 M/UL — SIGNIFICANT CHANGE UP (ref 4.2–5.4)
RBC # FLD: 12.7 % — SIGNIFICANT CHANGE UP (ref 11.5–14.5)
SODIUM SERPL-SCNC: 138 MMOL/L — SIGNIFICANT CHANGE UP (ref 135–146)
WBC # BLD: 16.7 K/UL — HIGH (ref 4.8–10.8)
WBC # FLD AUTO: 16.7 K/UL — HIGH (ref 4.8–10.8)

## 2019-02-13 RX ADMIN — GABAPENTIN 300 MILLIGRAM(S): 400 CAPSULE ORAL at 05:03

## 2019-02-13 RX ADMIN — Medication 110 MILLIGRAM(S): at 05:01

## 2019-02-13 RX ADMIN — BUDESONIDE AND FORMOTEROL FUMARATE DIHYDRATE 2 PUFF(S): 160; 4.5 AEROSOL RESPIRATORY (INHALATION) at 22:51

## 2019-02-13 RX ADMIN — SODIUM CHLORIDE 3 MILLILITER(S): 9 INJECTION INTRAMUSCULAR; INTRAVENOUS; SUBCUTANEOUS at 13:34

## 2019-02-13 RX ADMIN — Medication 60 MILLIGRAM(S): at 05:03

## 2019-02-13 RX ADMIN — SODIUM CHLORIDE 3 MILLILITER(S): 9 INJECTION INTRAMUSCULAR; INTRAVENOUS; SUBCUTANEOUS at 05:11

## 2019-02-13 RX ADMIN — SODIUM CHLORIDE 3 MILLILITER(S): 9 INJECTION INTRAMUSCULAR; INTRAVENOUS; SUBCUTANEOUS at 22:51

## 2019-02-13 RX ADMIN — ENOXAPARIN SODIUM 40 MILLIGRAM(S): 100 INJECTION SUBCUTANEOUS at 05:03

## 2019-02-13 RX ADMIN — DULOXETINE HYDROCHLORIDE 60 MILLIGRAM(S): 30 CAPSULE, DELAYED RELEASE ORAL at 12:11

## 2019-02-13 RX ADMIN — Medication 110 MILLIGRAM(S): at 17:21

## 2019-02-13 NOTE — PROGRESS NOTE ADULT - SUBJECTIVE AND OBJECTIVE BOX
DAILY PROGRESS NOTE  ===========================================================    Patient Information:  DEANDRA WHITE  /  39y  /  Female  /  MRN#: [id number]    Hospital Day: 1d     |:::::::::::::::::::::::::::| SUBJECTIVE |:::::::::::::::::::::::::::|    OVERNIGHT EVENTS: None reported  TODAY: Patient was seen today at bedside. She reports her cough is now productive with yellow sputum. She denies fever and chills. She is not SOB at rest, but is YU. Review of systems is otherwise negative.    |:::::::::::::::::::::::::::| OBJECTIVE |:::::::::::::::::::::::::::|    VITAL SIGNS: Last 24 Hours  T(C): 36.1 (13 Feb 2019 00:40), Max: 36.3 (12 Feb 2019 17:05)  T(F): 97 (13 Feb 2019 00:40), Max: 97.3 (12 Feb 2019 17:05)  HR: 79 (13 Feb 2019 00:40) (77 - 79)  BP: 122/59 (13 Feb 2019 00:40) (121/62 - 125/76)  BP(mean): 85 (13 Feb 2019 00:40) (85 - 85)  RR: 18 (13 Feb 2019 00:40) (18 - 18)  SpO2: 99% (13 Feb 2019 00:40) (95% - 99%)    PHYSICAL EXAM:  GENERAL:   Awake, alert; NAD; Coughing.  HEENT:  Head NC/AT; Conjunctivae pink, Sclera anicteric; Oral mucosa moist.  CARDIO:   Regular rate; Regular rhythm; S1 & S2.  RESP:   Course sounds BL in middle and lower lobes on auscultation.  GI:   Soft; NT/ND; BS; No guarding; No rebound tenderness.  EXT:   No edema in UE and LE.  NEURO:   PERRL.  SKIN:   Intact.    LAB RESULTS:                        13.3   10.16 )-----------( 416      ( 12 Feb 2019 12:22 )             38.4     136  |  98  |  9<L>  ----------------------------<  104<H>  5.1<H>   |  21  |  0.6<L>    Ca      9.5       Mg     2.2         TPro  7.4  /  Alb  3.8  /  TBili  <0.2  /  DBili  x   /  AST  60<H>  /  ALT  26  /  AlkPhos  72      PT: 12.60 sec;   INR: 1.10 ratio    PTT:21.5 sec    Creatine Kinase, Serum: 213 U/L (02-12-19 @ 12:22)  Troponin T, Serum: <0.01 ng/mL (02-12-19 @ 12:22)  Sedimentation Rate, Erythrocyte: 54 mm/hr <H> (02-12-19 @ 12:22)    MICROBIOLOGY:  No culture results    RADIOLOGY:  Xray Chest 2 Views PA/Lat (02.11.19 @ 23:49)   Impression:    ·	Since November 20, 2018, there are new bilateral mid lung field opacities, right greater than left.   ·	Findings may reflect multifocal pneumonia in the appropriate clinical setting. Follow-up to demonstrate resolution is recommended.    ALLERGIES:  No Known Allergies    HOME MEDICATIONS:  ALBUTEROL SUL HFA 90 MCG INH:  (12 Feb 2019 03:36)  ALPRAZOLAM 1 MG TABLET:  (12 Feb 2019 03:36)  CYCLOBENZAPRINE 10 MG TABLET:  (12 Feb 2019 03:36)  DULOXETINE HCL DR 60 MG CAP:  (12 Feb 2019 03:36)  FEXOFENADINE  MG TABLET:  (12 Feb 2019 03:36)  GABAPENTIN 300 MG CAPSULE:  (12 Feb 2019 03:36)  MELOXICAM 15 MG TABLET:  (12 Feb 2019 03:36)  OXYCODONE-ACETAMINOPHEN :  (12 Feb 2019 03:36)    ===========================================================

## 2019-02-13 NOTE — PROGRESS NOTE ADULT - SUBJECTIVE AND OBJECTIVE BOX
Patient is a 39y old  Female who presents with a chief complaint of Multifocal Pneumonia (12 Feb 2019 15:27)        SUBJECTIVE: Feels much better      REVIEW OF SYSTEMS:  See HPI    PHYSICAL EXAM  Vital Signs Last 24 Hrs  T(C): 36.1 (13 Feb 2019 00:40), Max: 36.3 (12 Feb 2019 17:05)  T(F): 97 (13 Feb 2019 00:40), Max: 97.3 (12 Feb 2019 17:05)  HR: 79 (13 Feb 2019 00:40) (77 - 79)  BP: 122/59 (13 Feb 2019 00:40) (121/62 - 125/76)  BP(mean): 85 (13 Feb 2019 00:40) (85 - 85)  RR: 18 (13 Feb 2019 00:40) (18 - 18)  SpO2: 96% on RA (13 Feb 2019 00:40) (95% - 99%)    General: In NAD  HEENT: MARION  Lymphatic system: No Cervical LN    Respiratory: Bilateral wheezing - improved  Cardiovascular: Regular  Gastrointestinal: Soft. + BS  Musculoskeletal: No clubbing.  moves all extremities.  Full range of motion    Skin: Warm.  Intact  Neurological: No motor or sensory deficit        LABS:                          13.3   10.16 )-----------( 416      ( 12 Feb 2019 12:22 )             38.4                                               02-12    136  |  98  |  9<L>  ----------------------------<  104<H>  5.1<H>   |  21  |  0.6<L>    Ca    9.5      12 Feb 2019 12:22  Mg     2.2     02-12    TPro  7.4  /  Alb  3.8  /  TBili  <0.2  /  DBili  x   /  AST  60<H>  /  ALT  26  /  AlkPhos  72  02-11      PT/INR - ( 11 Feb 2019 22:40 )   PT: 12.60 sec;   INR: 1.10 ratio         PTT - ( 11 Feb 2019 22:40 )  PTT:21.5 sec                                           CARDIAC MARKERS ( 12 Feb 2019 12:22 )  x     / <0.01 ng/mL / 213 U/L / x     / 3.9 ng/mL  CARDIAC MARKERS ( 11 Feb 2019 22:40 )  x     / <0.01 ng/mL / x     / x     / x                                                LIVER FUNCTIONS - ( 11 Feb 2019 22:40 )  Alb: 3.8 g/dL / Pro: 7.4 g/dL / ALK PHOS: 72 U/L / ALT: 26 U/L / AST: 60 U/L / GGT: x                                                                                                MEDICATIONS  (STANDING):  buDESOnide 160 MICROgram(s)/formoterol 4.5 MICROgram(s) Inhaler 2 Puff(s) Inhalation two times a day  doxycycline IVPB 100 milliGRAM(s) IV Intermittent every 12 hours  doxycycline IVPB      DULoxetine 60 milliGRAM(s) Oral daily  enoxaparin Injectable 40 milliGRAM(s) SubCutaneous every 24 hours  gabapentin 300 milliGRAM(s) Oral two times a day  methylPREDNISolone sodium succinate Injectable 60 milliGRAM(s) IV Push daily  sodium chloride 0.9% lock flush 3 milliLiter(s) IV Push every 8 hours    MEDICATIONS  (PRN):  acetaminophen   Tablet .. 650 milliGRAM(s) Oral every 6 hours PRN Temp greater or equal to 38C (100.4F), Moderate Pain (4 - 6)  ALBUTerol    90 MICROgram(s) HFA Inhaler 2 Puff(s) Inhalation every 6 hours PRN Shortness of Breath and/or Wheezing  ALPRAZolam 1 milliGRAM(s) Oral daily PRN Anxiety Patient is a 39y old  Female who presents with a chief complaint of Multifocal Pneumonia (12 Feb 2019 15:27)        SUBJECTIVE: Feels much better      REVIEW OF SYSTEMS:  See HPI    PHYSICAL EXAM  Vital Signs Last 24 Hrs  T(C): 36.1 (13 Feb 2019 00:40), Max: 36.3 (12 Feb 2019 17:05)  T(F): 97 (13 Feb 2019 00:40), Max: 97.3 (12 Feb 2019 17:05)  HR: 79 (13 Feb 2019 00:40) (77 - 79)  BP: 122/59 (13 Feb 2019 00:40) (121/62 - 125/76)  BP(mean): 85 (13 Feb 2019 00:40) (85 - 85)  RR: 18 (13 Feb 2019 00:40) (18 - 18)  SpO2: 96% on RA (13 Feb 2019 00:40) (95% - 99%)    General: In NAD  HEENT: MARION  Lymphatic system: No Cervical LN    Respiratory: Improved air entry.  No wheezing   Cardiovascular: Regular  Gastrointestinal: Soft. + BS  Musculoskeletal: No clubbing.  moves all extremities.  Full range of motion    Skin: Warm.  Intact  Neurological: No motor or sensory deficit        LABS:                          13.3   10.16 )-----------( 416      ( 12 Feb 2019 12:22 )             38.4                                               02-12    136  |  98  |  9<L>  ----------------------------<  104<H>  5.1<H>   |  21  |  0.6<L>    Ca    9.5      12 Feb 2019 12:22  Mg     2.2     02-12    TPro  7.4  /  Alb  3.8  /  TBili  <0.2  /  DBili  x   /  AST  60<H>  /  ALT  26  /  AlkPhos  72  02-11      PT/INR - ( 11 Feb 2019 22:40 )   PT: 12.60 sec;   INR: 1.10 ratio         PTT - ( 11 Feb 2019 22:40 )  PTT:21.5 sec                                           CARDIAC MARKERS ( 12 Feb 2019 12:22 )  x     / <0.01 ng/mL / 213 U/L / x     / 3.9 ng/mL  CARDIAC MARKERS ( 11 Feb 2019 22:40 )  x     / <0.01 ng/mL / x     / x     / x                                                LIVER FUNCTIONS - ( 11 Feb 2019 22:40 )  Alb: 3.8 g/dL / Pro: 7.4 g/dL / ALK PHOS: 72 U/L / ALT: 26 U/L / AST: 60 U/L / GGT: x                                                                                                MEDICATIONS  (STANDING):  buDESOnide 160 MICROgram(s)/formoterol 4.5 MICROgram(s) Inhaler 2 Puff(s) Inhalation two times a day  doxycycline IVPB 100 milliGRAM(s) IV Intermittent every 12 hours  doxycycline IVPB      DULoxetine 60 milliGRAM(s) Oral daily  enoxaparin Injectable 40 milliGRAM(s) SubCutaneous every 24 hours  gabapentin 300 milliGRAM(s) Oral two times a day  methylPREDNISolone sodium succinate Injectable 60 milliGRAM(s) IV Push daily  sodium chloride 0.9% lock flush 3 milliLiter(s) IV Push every 8 hours    MEDICATIONS  (PRN):  acetaminophen   Tablet .. 650 milliGRAM(s) Oral every 6 hours PRN Temp greater or equal to 38C (100.4F), Moderate Pain (4 - 6)  ALBUTerol    90 MICROgram(s) HFA Inhaler 2 Puff(s) Inhalation every 6 hours PRN Shortness of Breath and/or Wheezing  ALPRAZolam 1 milliGRAM(s) Oral daily PRN Anxiety

## 2019-02-13 NOTE — PROGRESS NOTE ADULT - ASSESSMENT
IMPRESSION:    Asthma exacerbation improving  Smoker  Pneumonia .  RO opportunistic infections VS pneumonitis or toxic lung injury       RECOMMENDATIONS:    Continue with IV abx  Check urine strep and legionella.  Mycoplasma titters and repeat in 6 weeks   LDH. Fungitell   Solumedrol 60mg IV 24  Nebs q4 and PRN  Smoking cessation   Basic rheum and vasculitis panel   Stools for ova and parasites.   UDS SDS  PFT  We will assess in am if stable for BAL  DVT prophylaxis   Keep SpO2>92% IMPRESSION:    Asthma exacerbation improving  Smoker  Pneumonia .  RO opportunistic infections VS pneumonitis or toxic lung injury       RECOMMENDATIONS:    Continue with IV abx  Will schedule for bronchoscopy tomorrow AM  Keep NPO after midnight  Check urine strep and legionella.  Mycoplasma titters and repeat in 6 weeks   LDH. Fungitell   Solumedrol 60mg IV 24  Nebs q4 and PRN  Smoking cessation   Basic rheum and vasculitis panel   Stools for ova and parasites.   UDS SDS  PFT  We will assess in am if stable for BAL  DVT prophylaxis   Keep SpO2>92% IMPRESSION:    Asthma exacerbation improved  Smoker  Pneumonia .  RO opportunistic infections VS pneumonitis or toxic lung injury       RECOMMENDATIONS:    Finish ABX  course  Will schedule for bronchoscopy tomorrow AM for BAL for cell count and diff   Keep NPO after midnight  Check urine strep and legionella.  Mycoplasma titters and repeat in 6 weeks   LDH. Fungitell   Prednisone taper   Nebs q4 and PRN  Smoking cessation   ICS / LABA upon DC  Can DC from pulm stand point after bronchoscopy

## 2019-02-13 NOTE — PROGRESS NOTE ADULT - SUBJECTIVE AND OBJECTIVE BOX
DEANDRA WHITE  39y, Female      OVERNIGHT EVENTS:    no fevers, cough is less, no chest pain, minimal SOB    VITALS:  T(F): 97, Max: 97.3 (02-12-19 @ 17:05)  HR: 79  BP: 122/59  RR: 18Vital Signs Last 24 Hrs  T(C): 36.1 (13 Feb 2019 00:40), Max: 36.3 (12 Feb 2019 17:05)  T(F): 97 (13 Feb 2019 00:40), Max: 97.3 (12 Feb 2019 17:05)  HR: 79 (13 Feb 2019 00:40) (77 - 79)  BP: 122/59 (13 Feb 2019 00:40) (121/62 - 125/76)  BP(mean): 85 (13 Feb 2019 00:40) (85 - 85)  RR: 18 (13 Feb 2019 00:40) (18 - 18)  SpO2: 99% (13 Feb 2019 00:40) (95% - 99%)    TESTS & MEASUREMENTS:                        13.3   10.16 )-----------( 416      ( 12 Feb 2019 12:22 )             38.4     02-12    136  |  98  |  9<L>  ----------------------------<  104<H>  5.1<H>   |  21  |  0.6<L>    Ca    9.5      12 Feb 2019 12:22  Mg     2.2     02-12    TPro  7.4  /  Alb  3.8  /  TBili  <0.2  /  DBili  x   /  AST  60<H>  /  ALT  26  /  AlkPhos  72  02-11    LIVER FUNCTIONS - ( 11 Feb 2019 22:40 )  Alb: 3.8 g/dL / Pro: 7.4 g/dL / ALK PHOS: 72 U/L / ALT: 26 U/L / AST: 60 U/L / GGT: x                   RADIOLOGY & ADDITIONAL TESTS:    ANTIBIOTICS:  doxycycline IVPB 100 milliGRAM(s) IV Intermittent every 12 hours  doxycycline IVPB

## 2019-02-13 NOTE — PROGRESS NOTE ADULT - SUBJECTIVE AND OBJECTIVE BOX
DEANDRA WHITE  39y  [facility] [address]      Patient is a 39y old  Female who presents with a chief complaint of Acute eosinophilic pneumonia (13 Feb 2019 10:35)      INTERVAL HPI/OVERNIGHT EVENTS:    no acute events overnight, patient feels much better, less short of breath    REVIEW OF SYSTEMS:  CONSTITUTIONAL: No fever, weight loss, or fatigue  EYES: No eye pain, visual disturbances, or discharge  ENMT:  No difficulty hearing, tinnitus, vertigo; No sinus or throat pain  NECK: No pain or stiffness  BREASTS: No pain, masses, or nipple discharge  RESPIRATORY: Improved breathing , able to wlak without stopping   CARDIOVASCULAR: No chest pain, palpitations, dizziness, or leg swelling  GASTROINTESTINAL: No abdominal or epigastric pain. No nausea, vomiting, or hematemesis; No diarrhea or constipation. No melena or hematochezia.  GENITOURINARY: No dysuria, frequency, hematuria, or incontinence  NEUROLOGICAL: No headaches, memory loss, loss of strength, numbness, or tremors  SKIN: No itching, burning, rashes, or lesions   LYMPH NODES: No enlarged glands  ENDOCRINE: No heat or cold intolerance; No hair loss  MUSCULOSKELETAL: No joint pain or swelling; No muscle, back, or extremity pain  PSYCHIATRIC: No depression, anxiety, mood swings, or difficulty sleeping  HEME/LYMPH: No easy bruising, or bleeding gums  ALLERY AND IMMUNOLOGIC: No hives or eczema  FAMILY HISTORY:  No pertinent family history in first degree relatives    T(C): 36.1 (02-13-19 @ 00:40), Max: 36.3 (02-12-19 @ 17:05)  HR: 114 (02-13-19 @ 12:43) (77 - 114)  BP: 127/59 (02-13-19 @ 12:43) (121/62 - 127/59)  RR: 18 (02-13-19 @ 12:43) (18 - 18)  SpO2: 99% (02-13-19 @ 00:40) (95% - 99%)  Wt(kg): --Vital Signs Last 24 Hrs  T(C): 36.1 (13 Feb 2019 00:40), Max: 36.3 (12 Feb 2019 17:05)  T(F): 97 (13 Feb 2019 00:40), Max: 97.3 (12 Feb 2019 17:05)  HR: 114 (13 Feb 2019 12:43) (77 - 114)  BP: 127/59 (13 Feb 2019 12:43) (121/62 - 127/59)  BP(mean): 85 (13 Feb 2019 00:40) (85 - 85)  RR: 18 (13 Feb 2019 12:43) (18 - 18)  SpO2: 99% (13 Feb 2019 00:40) (95% - 99%)    PHYSICAL EXAM:  GENERAL: NAD, well-groomed, well-developed  HEAD:  Atraumatic, Normocephalic  EYES: EOMI, PERRLA, conjunctiva and sclera clear  ENMT: No tonsillar erythema, exudates, or enlargement; Moist mucous membranes, Good dentition, No lesions  NECK: Supple, No JVD, Normal thyroid  NERVOUS SYSTEM:  Alert & Oriented X3, Good concentration;Right ulnar nerve palsy (claw hand)  PULM: Clear to auscultation bilaterally  CARDIAC: Regular rate and rhythm; No murmurs, rubs, or gallops  GI: Soft, Nontender, Nondistended; Bowel sounds present  EXTREMITIES:  2+ Peripheral Pulses, No clubbing, cyanosis, or edema  LYMPH: No lymphadenopathy noted  SKIN: No rashes or lesions    Consultant(s) Notes Reviewed:  [x ] YES  [ ] NO  Care Discussed with Consultants/Other Providers [ x] YES  [ ] NO    LABS:                            13.3   10.16 )-----------( 416      ( 12 Feb 2019 12:22 )             38.4   02-12    136  |  98  |  9<L>  ----------------------------<  104<H>  5.1<H>   |  21  |  0.6<L>    Ca    9.5      12 Feb 2019 12:22  Mg     2.2     02-12    TPro  7.4  /  Alb  3.8  /  TBili  <0.2  /  DBili  x   /  AST  60<H>  /  ALT  26  /  AlkPhos  72  02-11            acetaminophen   Tablet .. 650 milliGRAM(s) Oral every 6 hours PRN  ALBUTerol    90 MICROgram(s) HFA Inhaler 2 Puff(s) Inhalation every 6 hours PRN  ALPRAZolam 1 milliGRAM(s) Oral daily PRN  buDESOnide 160 MICROgram(s)/formoterol 4.5 MICROgram(s) Inhaler 2 Puff(s) Inhalation two times a day  doxycycline IVPB 100 milliGRAM(s) IV Intermittent every 12 hours  doxycycline IVPB      DULoxetine 60 milliGRAM(s) Oral daily  enoxaparin Injectable 40 milliGRAM(s) SubCutaneous every 24 hours  gabapentin 300 milliGRAM(s) Oral two times a day  methylPREDNISolone sodium succinate Injectable 60 milliGRAM(s) IV Push daily  sodium chloride 0.9% lock flush 3 milliLiter(s) IV Push every 8 hours      HEALTH ISSUES - PROBLEM Dx:          Case Discussed with House Staff    Spectra [phone]

## 2019-02-13 NOTE — PROGRESS NOTE ADULT - ASSESSMENT
This is a 39 year old female with a significant PMHx of Asthma and Crohn's disease who presented with a cc/o of acute onset chest pain, associated with dyspnea. CXR showed BL infiltrates, read as concerning for multifocal pneumonia. ID has been consulted, noting acute eosinophilic PNA [Quant IgE 3203], which is now the current working diagnosis.     ===========================================================  Today/Updates:  Patient is still with rhonchi BL. Pulmonology and ID recommendations appreciated; work up recommendations being followed.   ===========================================================    Acute multifocal pneumonia; atypical/Eosinophilic vs. allergic vs. toxic lung injury  - ID recommendations appreciated  - Pulmonology recommendations appreciated  - ESR (54);  CK (213);  CRP (6.01);  LDH (379);  Lactate (2.5)  - Quantitative IgE level (3203); Crohn's Hx may contribute to elevated level in addition to suspected Dx above  - Fungitell (pending result)  - Mycoplasma IgM/IgG (pending result)   - Urine streptococcus and legionella (pending collection)  - Stool studies (pending collection)  - Peak flow  - Considering bronchial lavage by pulmonology  - Maintaining SpO2>92%    Acute asthma exacerbation; 2/2 infectious vs. allergic process  - Admits to exposure to pesticide which may have triggered symptoms  - Continuing IV steroids and will follow pulmonology's recommendations    Suspected illicit drug use  Follow up with Urine drug screen; admits to home use of prescribed opiates for back pain    Crohn's Disease; Chronic  - Not active and receives no treatment as an OP  - Monitoring    Anxiety; likely generalized  - Reports home Duloxetine and Benzo PRN; Continue as inpatient    Back pain; Etiology  - Continue home Gabapentin    Electrolyte Imbalances: Labs not ordered; will follow up with afternoon round  []  Hyponatremia   /   Hypernatremia  []   []  Hypokalemia   /   Hyperkalemia  [X]   []  Hypochloremia   /    Hyperchloremia  []   []  Hypocapnia   /   Hypercapnia  []   []  Hypomagnesemia   /   Hypermagnesemia  []   []  Hypophosphatemia   /   Hyperphosphatemia  []       GI ppx:                                   [X] Not indicated   /   [] Pantoprazole 40mg PO Daily    DVT ppx:  [] Not indicated / [] Heparin 5000mg SubQ / [X] Lovenox 40mg SubQ / [] SCDs    Fluids:   [X] PO  |  [] IVF    Activity:  [] Ad Susan  /  [X] Increase as Tolerated  /  [] OOB w/ assist  /  [] Bed Rest    BMI:  Height (cm): 162.56 (02-11)  Weight (kg): 68 (02-11)  BMI (kg/m2): 25.7 (02-11)    DISPO:  Patient to be discharged when condition(s) optimized.  [X] Home  /  [] SNF  /  [] Long Term       [X] Discussion with patient and/or proxy regarding goals of care.  [X] Discussed Case and Plan with the Medical Attending.    CODE STATUS  [X] FULL   /    [] DNR    Please call Dr. Guerrero [PGY-1] with any questions/consult recs: Spectra #[phone] This is a 39 year old female with a significant PMHx of Asthma and Crohn's disease who presented with a cc/o of acute onset chest pain, associated with dyspnea. CXR showed BL infiltrates, read as concerning for multifocal pneumonia. ID has been consulted, noting acute eosinophilic PNA [Quant IgE 3203], which is now the current working diagnosis.     ===========================================================  Today/Updates:  Patient is still with rhonchi BL. Pulmonology and ID recommendations appreciated; work up recommendations being followed.   ===========================================================    Acute multifocal pneumonia; atypical/Eosinophilic vs. allergic vs. toxic lung injury  - ID recommendations appreciated  - Pulmonology recommendations appreciated  - ESR (54);  CK (213);  CRP (6.01);  LDH (379);  Lactate (2.5)  - Quantitative IgE level (3203); Crohn's Hx may contribute to elevated level in addition to suspected Dx above  - Fungitell (pending result)  - Mycoplasma IgM/IgG (pending result)   - Urine streptococcus and legionella (pending collection)  - Stool studies (pending collection)  - Continue Doxycycline 100mg IV Q12H  - Peak flow  - Considering bronchial lavage by pulmonology  - Maintaining SpO2>92%    Acute asthma exacerbation; 2/2 infectious vs. allergic process  - Admits to exposure to pesticide which may have triggered symptoms  - Continuing IV steroids and will follow pulmonology's recommendations  - Continuing Solumedrol 60mg IV daily  - Continue Symbicort and Albuterol PRN    Suspected illicit drug use  Follow up with Urine drug screen; admits to home use of prescribed opiates for back pain    Crohn's Disease; Chronic  - Not active and receives no treatment as an OP  - Monitoring    Anxiety; likely generalized  - Reports home Duloxetine and Benzo PRN; Continue as inpatient    Back pain; Etiology  - Continue home Gabapentin    Electrolyte Imbalances: Labs not ordered; will follow up with afternoon round  []  Hyponatremia   /   Hypernatremia  []   []  Hypokalemia   /   Hyperkalemia  [X]   []  Hypochloremia   /    Hyperchloremia  []   []  Hypocapnia   /   Hypercapnia  []   []  Hypomagnesemia   /   Hypermagnesemia  []   []  Hypophosphatemia   /   Hyperphosphatemia  []       GI ppx:                                   [X] Not indicated   /   [] Pantoprazole 40mg PO Daily    DVT ppx:  [] Not indicated / [] Heparin 5000mg SubQ / [X] Lovenox 40mg SubQ / [] SCDs    Fluids:   [X] PO  |  [] IVF    Activity:  [] Ad Susan  /  [X] Increase as Tolerated  /  [] OOB w/ assist  /  [] Bed Rest    BMI:  Height (cm): 162.56 (02-11)  Weight (kg): 68 (02-11)  BMI (kg/m2): 25.7 (02-11)    DISPO:  Patient to be discharged when condition(s) optimized.  [X] Home  /  [] SNF  /  [] Long Term       [X] Discussion with patient and/or proxy regarding goals of care.  [X] Discussed Case and Plan with the Medical Attending.    CODE STATUS  [X] FULL   /    [] DNR    Please call Dr. Guerrero [PGY-1] with any questions/consult recs: Spectra #[phone]

## 2019-02-13 NOTE — PROGRESS NOTE ADULT - ASSESSMENT
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  Allergen possibly the insecticide that she used  No evidence of a bacterial PNA  Psittacosis in differential ( given that she has 2 parakeets the incubation period/ presentation is quite different and generally no eosinophils )  HIV negative    RECOMMENDATIONS:  Steroids  Doxycycline 100 mg iv q12h   F/u with pulmonary

## 2019-02-13 NOTE — PROGRESS NOTE ADULT - ASSESSMENT
#Acute multifocal pneumonia with elevated igE level   - on doxcycline   -pulmonary consult -> possible bronchoscopy   -change to po prednine 40 mg q daily    #Crohn's disease   -stable     #Anxiety   -controlled     #Ulnar nerve palsy  -chronic   -op evaluation recommended with neurology\    #Hyperkalemia   -repeat potassium level     DW HS

## 2019-02-14 ENCOUNTER — RESULT REVIEW (OUTPATIENT)
Age: 39
End: 2019-02-14

## 2019-02-14 ENCOUNTER — TRANSCRIPTION ENCOUNTER (OUTPATIENT)
Age: 39
End: 2019-02-14

## 2019-02-14 VITALS — SYSTOLIC BLOOD PRESSURE: 146 MMHG | RESPIRATION RATE: 18 BRPM | HEART RATE: 103 BPM | DIASTOLIC BLOOD PRESSURE: 58 MMHG

## 2019-02-14 LAB
AMPHET UR-MCNC: NEGATIVE — SIGNIFICANT CHANGE UP
ANION GAP SERPL CALC-SCNC: 17 MMOL/L — HIGH (ref 7–14)
AUTO DIFF PNL BLD: NEGATIVE — SIGNIFICANT CHANGE UP
BARBITURATES UR SCN-MCNC: NEGATIVE — SIGNIFICANT CHANGE UP
BASOPHILS # BLD AUTO: 0.03 K/UL — SIGNIFICANT CHANGE UP (ref 0–0.2)
BASOPHILS NFR BLD AUTO: 0.2 % — SIGNIFICANT CHANGE UP (ref 0–1)
BENZODIAZ UR-MCNC: POSITIVE
BUN SERPL-MCNC: 17 MG/DL — SIGNIFICANT CHANGE UP (ref 10–20)
C-ANCA SER-ACNC: NEGATIVE — SIGNIFICANT CHANGE UP
C3 SERPL-MCNC: 193 MG/DL — HIGH (ref 81–157)
C4 SERPL-MCNC: 30 MG/DL — SIGNIFICANT CHANGE UP (ref 13–39)
CALCIUM SERPL-MCNC: 9 MG/DL — SIGNIFICANT CHANGE UP (ref 8.5–10.1)
CHLORIDE SERPL-SCNC: 101 MMOL/L — SIGNIFICANT CHANGE UP (ref 98–110)
CO2 SERPL-SCNC: 19 MMOL/L — SIGNIFICANT CHANGE UP (ref 17–32)
COCAINE METAB.OTHER UR-MCNC: NEGATIVE — SIGNIFICANT CHANGE UP
CREAT SERPL-MCNC: 0.6 MG/DL — LOW (ref 0.7–1.5)
DRUG SCREEN 1, URINE RESULT: SIGNIFICANT CHANGE UP
DSDNA AB SER-ACNC: <12 IU/ML — SIGNIFICANT CHANGE UP
EOSINOPHIL # BLD AUTO: 0.01 K/UL — SIGNIFICANT CHANGE UP (ref 0–0.7)
EOSINOPHIL NFR BLD AUTO: 0.1 % — SIGNIFICANT CHANGE UP (ref 0–8)
GLUCOSE SERPL-MCNC: 105 MG/DL — HIGH (ref 70–99)
GRAM STN FLD: SIGNIFICANT CHANGE UP
HCT VFR BLD CALC: 38.7 % — SIGNIFICANT CHANGE UP (ref 37–47)
HGB BLD-MCNC: 12.7 G/DL — SIGNIFICANT CHANGE UP (ref 12–16)
IMM GRANULOCYTES NFR BLD AUTO: 0.7 % — HIGH (ref 0.1–0.3)
LYMPHOCYTES # BLD AUTO: 1.22 K/UL — SIGNIFICANT CHANGE UP (ref 1.2–3.4)
LYMPHOCYTES # BLD AUTO: 9.1 % — LOW (ref 20.5–51.1)
M PNEUMO IGG SER IA-ACNC: 2.77 INDEX — HIGH
M PNEUMO IGG SER IA-ACNC: POSITIVE
M PNEUMO IGM SER-ACNC: 287 UNITS/ML — SIGNIFICANT CHANGE UP
MCHC RBC-ENTMCNC: 31.1 PG — HIGH (ref 27–31)
MCHC RBC-ENTMCNC: 32.8 G/DL — SIGNIFICANT CHANGE UP (ref 32–37)
MCV RBC AUTO: 94.6 FL — SIGNIFICANT CHANGE UP (ref 81–99)
METHADONE UR-MCNC: NEGATIVE — SIGNIFICANT CHANGE UP
MONOCYTES # BLD AUTO: 0.26 K/UL — SIGNIFICANT CHANGE UP (ref 0.1–0.6)
MONOCYTES NFR BLD AUTO: 1.9 % — SIGNIFICANT CHANGE UP (ref 1.7–9.3)
MYCOPLASMA AG SPEC QL: NEGATIVE — SIGNIFICANT CHANGE UP
NEUTROPHILS # BLD AUTO: 11.74 K/UL — HIGH (ref 1.4–6.5)
NEUTROPHILS NFR BLD AUTO: 88 % — HIGH (ref 42.2–75.2)
NRBC # BLD: 0 /100 WBCS — SIGNIFICANT CHANGE UP (ref 0–0)
OPIATES UR-MCNC: NEGATIVE — SIGNIFICANT CHANGE UP
P-ANCA SER-ACNC: NEGATIVE — SIGNIFICANT CHANGE UP
PCP SPEC-MCNC: SIGNIFICANT CHANGE UP
PCP UR-MCNC: NEGATIVE — SIGNIFICANT CHANGE UP
PLATELET # BLD AUTO: 456 K/UL — HIGH (ref 130–400)
POTASSIUM SERPL-MCNC: 4.5 MMOL/L — SIGNIFICANT CHANGE UP (ref 3.5–5)
POTASSIUM SERPL-SCNC: 4.5 MMOL/L — SIGNIFICANT CHANGE UP (ref 3.5–5)
PROPOXYPHENE QUALITATIVE URINE RESULT: NEGATIVE — SIGNIFICANT CHANGE UP
RBC # BLD: 4.09 M/UL — LOW (ref 4.2–5.4)
RBC # FLD: 12.9 % — SIGNIFICANT CHANGE UP (ref 11.5–14.5)
RHEUMATOID FACT SERPL-ACNC: <10 IU/ML — SIGNIFICANT CHANGE UP (ref 0–13)
SODIUM SERPL-SCNC: 137 MMOL/L — SIGNIFICANT CHANGE UP (ref 135–146)
SPECIMEN SOURCE: SIGNIFICANT CHANGE UP
THC UR QL: NEGATIVE — SIGNIFICANT CHANGE UP
WBC # BLD: 13.36 K/UL — HIGH (ref 4.8–10.8)
WBC # FLD AUTO: 13.36 K/UL — HIGH (ref 4.8–10.8)

## 2019-02-14 RX ORDER — ACETAMINOPHEN 500 MG
2 TABLET ORAL
Qty: 0 | Refills: 0 | DISCHARGE
Start: 2019-02-14

## 2019-02-14 RX ORDER — CYCLOBENZAPRINE HYDROCHLORIDE 10 MG/1
0 TABLET, FILM COATED ORAL
Qty: 60 | Refills: 0 | COMMUNITY

## 2019-02-14 RX ADMIN — Medication 110 MILLIGRAM(S): at 06:26

## 2019-02-14 RX ADMIN — SODIUM CHLORIDE 3 MILLILITER(S): 9 INJECTION INTRAMUSCULAR; INTRAVENOUS; SUBCUTANEOUS at 06:27

## 2019-02-14 RX ADMIN — DULOXETINE HYDROCHLORIDE 60 MILLIGRAM(S): 30 CAPSULE, DELAYED RELEASE ORAL at 13:17

## 2019-02-14 RX ADMIN — GABAPENTIN 300 MILLIGRAM(S): 400 CAPSULE ORAL at 06:25

## 2019-02-14 RX ADMIN — SODIUM CHLORIDE 3 MILLILITER(S): 9 INJECTION INTRAMUSCULAR; INTRAVENOUS; SUBCUTANEOUS at 13:16

## 2019-02-14 RX ADMIN — Medication 40 MILLIGRAM(S): at 06:25

## 2019-02-14 NOTE — DISCHARGE NOTE ADULT - ADDITIONAL INSTRUCTIONS
Please follow up with:  1. Primary care  2. Pulmonology Please follow up with:  1. Primary care  2. Pulmonology    Appointment made with Pulmonary at 10 AM on Monday 2/18/2019 with Dr Armenta

## 2019-02-14 NOTE — CHART NOTE - NSCHARTNOTEFT_GEN_A_CORE
39y Female    with chronic pain medication use in process of bronchoscopy with difficulty of sedation.  Anesthesia assistant requested to help with sedation.      HPI:  39 year old female with known hx of Crohn's dx (not on Tx), asthma, anxiety, and 'cervical spinal cord damage'? presents to the ED for left sided chest pain with associated shortness of breath. As per the patient the symptoms have been occurring intermittently, but with increasing frequency over the past few days. She has also had associated cough, productive of whitish phlegm. Denies sicks contacts, fevers, chills, palpitations, GI, or  complaints. Patient is a poor historian and states her meds are all in computer, and she just needs something for pain. (12 Feb 2019 03:30)      PAST MEDICAL & SURGICAL HISTORY:  Crohn's disease without complication, unspecified gastrointestinal tract location  Anxiety  Asthma  No significant past surgical history        MEDICATIONS  (STANDING):  buDESOnide 160 MICROgram(s)/formoterol 4.5 MICROgram(s) Inhaler 2 Puff(s) Inhalation two times a day  doxycycline IVPB 100 milliGRAM(s) IV Intermittent every 12 hours  doxycycline IVPB      DULoxetine 60 milliGRAM(s) Oral daily  enoxaparin Injectable 40 milliGRAM(s) SubCutaneous every 24 hours  gabapentin 300 milliGRAM(s) Oral two times a day  predniSONE   Tablet 40 milliGRAM(s) Oral daily  sodium chloride 0.9% lock flush 3 milliLiter(s) IV Push every 8 hours    MEDICATIONS  (PRN):  acetaminophen   Tablet .. 650 milliGRAM(s) Oral every 6 hours PRN Temp greater or equal to 38C (100.4F), Moderate Pain (4 - 6)  ALBUTerol    90 MICROgram(s) HFA Inhaler 2 Puff(s) Inhalation every 6 hours PRN Shortness of Breath and/or Wheezing  ALPRAZolam 1 milliGRAM(s) Oral daily PRN Anxiety    Home Medications:  ALBUTEROL SUL HFA 90 MCG INH:  (12 Feb 2019 03:36)  ALPRAZOLAM 1 MG TABLET:  (12 Feb 2019 03:36)  CYCLOBENZAPRINE 10 MG TABLET:  (12 Feb 2019 03:36)  DULOXETINE HCL DR 60 MG CAP:  (12 Feb 2019 03:36)  FEXOFENADINE  MG TABLET:  (12 Feb 2019 03:36)  GABAPENTIN 300 MG CAPSULE:  (12 Feb 2019 03:36)  MELOXICAM 15 MG TABLET:  (12 Feb 2019 03:36)  OXYCODONE-ACETAMINOPHEN :  (12 Feb 2019 03:36)      Allergies    No Known Allergies    Intolerances        SOCIAL HISTORY: smokes(used to be 2PPD, now 3 cig/day)    FAMILY HISTORY:  No pertinent family history in first degree relatives      Vital Signs Last 24 Hrs  T(C): 36.8 (14 Feb 2019 07:07), Max: 36.8 (14 Feb 2019 07:07)  T(F): 98.2 (14 Feb 2019 07:07), Max: 98.2 (14 Feb 2019 07:07)  HR: 89 (14 Feb 2019 07:07) (88 - 114)  BP: 105/76 (14 Feb 2019 07:07) (105/76 - 127/59)  BP(mean): --  RR: 20 (14 Feb 2019 07:07) (18 - 20)  SpO2: --    NPO: __x__ Yes  ____ No    Exam:  Drug Dosing Weight  Height (cm): 162.56 (11 Feb 2019 20:43)  Weight (kg): 68 (11 Feb 2019 20:43)  BMI (kg/m2): 25.7 (11 Feb 2019 20:43)  BSA (m2): 1.73 (11 Feb 2019 20:43)    MP: I  Teeth: intact  Mouth opening:    LABS:                        13.6   16.70 )-----------( 508      ( 13 Feb 2019 17:59 )             41.3                         13.3   10.16 )-----------( 416      ( 12 Feb 2019 12:22 )             38.4                         12.3   17.04 )-----------( 359      ( 11 Feb 2019 23:15 )             36.0     CARDIAC MARKERS ( 12 Feb 2019 12:22 )  x     / <0.01 ng/mL / 213 U/L / x     / 3.9 ng/mL      02-13    138  |  96<L>  |  24<H>  ----------------------------<  124<H>  4.3   |  19  |  0.9  02-12    136  |  98  |  9<L>  ----------------------------<  104<H>  5.1<H>   |  21  |  0.6<L>  02-11    134<L>  |  97<L>  |  11  ----------------------------<  69<L>  9.5<HH>   |  22  |  0.7    Ca    9.5      13 Feb 2019 17:59  Ca    9.5      12 Feb 2019 12:22  Ca    8.9      11 Feb 2019 22:40  Mg     2.2     02-12    TPro  7.0  /  Alb  4.1  /  TBili  <0.2  /  DBili  x   /  AST  10  /  ALT  18  /  AlkPhos  78  02-13  TPro  7.4  /  Alb  3.8  /  TBili  <0.2  /  DBili  x   /  AST  60<H>  /  ALT  26  /  AlkPhos  72  02-11          RADIOLOGY & ADDITIONAL STUDIES:      ASA __IIIE__, dispite prior sedation, patient appears A )x3,  R/B/A discussed with: __x__ patient, ____ guardian, Understand and Accepts,  Question Answered.

## 2019-02-14 NOTE — DISCHARGE NOTE ADULT - MEDICATION SUMMARY - MEDICATIONS TO STOP TAKING
I will STOP taking the medications listed below when I get home from the hospital:    tiZANidine 2 mg oral capsule  -- 1 cap(s) by mouth every 8 hours   -- It is very important that you take or use this exactly as directed.  Do not skip doses or discontinue unless directed by your doctor.  May cause drowsiness.  Alcohol may intensify this effect.  Use care when operating dangerous machinery.  This drug may impair the ability to drive or operate machinery.  Use care until you become familiar with its effects.    CYCLOBENZAPRINE 10 MG TABLET

## 2019-02-14 NOTE — PROVIDER CONTACT NOTE (OTHER) - SITUATION
Patient returned to unit s/p bronchoscopy requesting water, diet order remains NPO except medications.

## 2019-02-14 NOTE — DISCHARGE NOTE ADULT - CARE PROVIDER_API CALL
Willy Hester)  Family Medicine  [address] [place] [address]  Phone: [phone]  Fax: [phone]  Follow Up Time:     Aime Kaiser)  Critical Care Medicine; Pulmonary Disease; Sleep Medicine  [address] [place] [address]  Phone: [phone]  Fax: [phone]  Follow Up Time:

## 2019-02-14 NOTE — DISCHARGE NOTE ADULT - MEDICATION SUMMARY - MEDICATIONS TO TAKE
I will START or STAY ON the medications listed below when I get home from the hospital:    ALBUTEROL SUL HFA 90 MCG INH  -- Indication: For Asthma exacerbation    predniSONE 20 mg oral tablet  -- 2 tab(s) by mouth once a day  -- Indication: For Asthma exacerbation    acetaminophen 325 mg oral tablet  -- 2 tab(s) by mouth every 6 hours, As needed, Temp greater or equal to 38C (100.4F), Moderate Pain (4 - 6)  -- Indication: For Pain    OXYCODONE-ACETAMINOPHEN   -- Indication: For Pain    MELOXICAM 15 MG TABLET  -- Indication: For Pain    GABAPENTIN 300 MG CAPSULE  -- Indication: For Pain    DULOXETINE HCL DR 60 MG CAP  -- Indication: For Depression    FEXOFENADINE  MG TABLET  -- Indication: For Allergies    ALPRAZOLAM 1 MG TABLET  -- Indication: For Anxiety    Symbicort 160 mcg-4.5 mcg/inh inhalation aerosol  -- 2 puff(s) inhaled once a day   -- Check with your doctor before becoming pregnant.  For inhalation only.  Rinse mouth thoroughly after use.    -- Indication: For Asthma exacerbation I will START or STAY ON the medications listed below when I get home from the hospital:    ALBUTEROL SUL HFA 90 MCG INH  -- Indication: For Asthma exacerbation    predniSONE 20 mg oral tablet  -- 2 tab(s) by mouth once a day  -- Indication: For Asthma exacerbation    acetaminophen 325 mg oral tablet  -- 2 tab(s) by mouth every 6 hours, As needed, Temp greater or equal to 38C (100.4F), Moderate Pain (4 - 6)  -- Indication: For Pain    OXYCODONE-ACETAMINOPHEN   -- Indication: For Pain    MELOXICAM 15 MG TABLET  -- Indication: For Pain    GABAPENTIN 300 MG CAPSULE  -- Indication: For Pain    DULOXETINE HCL DR 60 MG CAP  -- Indication: For Depression    FEXOFENADINE  MG TABLET  -- Indication: For Allergies    doxycycline hyclate 100 mg oral capsule  -- 1 cap(s) by mouth 2 times a day   -- Avoid prolonged or excessive exposure to direct and/or artificial sunlight while taking this medication.  Do not take this drug if you are pregnant.  Finish all this medication unless otherwise directed by prescriber.  Medication should be taken with plenty of water.    -- Indication: For Pneumonia    ALPRAZOLAM 1 MG TABLET  -- Indication: For Anxiety    Symbicort 160 mcg-4.5 mcg/inh inhalation aerosol  -- 2 puff(s) inhaled once a day   -- Check with your doctor before becoming pregnant.  For inhalation only.  Rinse mouth thoroughly after use.    -- Indication: For Asthma exacerbation

## 2019-02-14 NOTE — PROGRESS NOTE ADULT - SUBJECTIVE AND OBJECTIVE BOX
DAILY PROGRESS NOTE  ===========================================================    Patient Information:  DEANDRA WHITE  /  39y  /  Female  /  MRN#: [id number]    Hospital Day: 2d    |:::::::::::::::::::::::::::| SUBJECTIVE |:::::::::::::::::::::::::::|    OVERNIGHT EVENTS: None reported  TODAY: Patient was seen today at bedside. She has no new complaints today and condition is unchanged clinically. Review of systems is otherwise negative.    |:::::::::::::::::::::::::::| OBJECTIVE |:::::::::::::::::::::::::::|    VITAL SIGNS: Last 24 Hours  T(F): 98.2 (14 Feb 2019 07:07), Max: 98.2 (14 Feb 2019 07:07)  HR: 90 (14 Feb 2019 08:47) (88 - 114)  BP: 112/74 (14 Feb 2019 08:47) (105/76 - 127/59)  RR: 18 (14 Feb 2019 08:47) (18 - 20)  SpO2: 97% (14 Feb 2019 08:47) (96% - 98%)    PHYSICAL EXAM:  GENERAL:   Awake, alert; NAD; Coughing.  HEENT:  Head NC/AT; Conjunctivae pink, Sclera anicteric; Oral mucosa moist.  CARDIO:   Regular rate; Regular rhythm; S1 & S2.  RESP:   Course sounds BL in middle and lower lobes on auscultation; mild improvement.  GI:   Soft; NT/ND; BS; No guarding; No rebound tenderness.  EXT:   No edema in UE and LE.  NEURO:   PERRL.  SKIN:   Intact.    LAB RESULTS:                       13.6  16.70<H> >----------< 508<H>                       41.3    MCV: 94.5  MCHC: 32.9  RDW:  12.7    02-13-19 @ 17:59    138  |  96<L>  |  24<H>             --------------------------< 124<H>     4.3  |  19  | 0.9    eGFR AA: 93  eGFR N-AA: 81    Calcium: 9.5    AST: 10    ALT: 18  AlkPhos: 78  Protein: 7.0  Albumin: 4.1  TBili: <0.2    MICROBIOLOGY:  No culture results    RADIOLOGY:  Xray Chest 2 Views PA/Lat (02.11.19 @ 23:49)   Impression:    ·	Since November 20, 2018, there are new bilateral mid lung field opacities, right greater than left.   ·	Findings may reflect multifocal pneumonia in the appropriate clinical setting. Follow-up to demonstrate resolution is recommended.    ALLERGIES:  No Known Allergies    HOME MEDICATIONS:  ALBUTEROL SUL HFA 90 MCG INH:  (12 Feb 2019 03:36)  ALPRAZOLAM 1 MG TABLET:  (12 Feb 2019 03:36)  CYCLOBENZAPRINE 10 MG TABLET:  (12 Feb 2019 03:36)  DULOXETINE HCL DR 60 MG CAP:  (12 Feb 2019 03:36)  FEXOFENADINE  MG TABLET:  (12 Feb 2019 03:36)  GABAPENTIN 300 MG CAPSULE:  (12 Feb 2019 03:36)  MELOXICAM 15 MG TABLET:  (12 Feb 2019 03:36)  OXYCODONE-ACETAMINOPHEN :  (12 Feb 2019 03:36)    ===========================================================

## 2019-02-14 NOTE — DISCHARGE NOTE ADULT - HOSPITAL COURSE
This is a 39 year old female with a significant PMHx of Asthma and Crohn's disease who presented with a cc/o of acute onset chest pain, associated with dyspnea. CXR showed BL infiltrates, read as concerning for multifocal pneumonia. ID has been consulted, noting acute eosinophilic PNA [Quant IgE 3203], which is now the current working diagnosis.     Acute multifocal pneumonia; atypical/Eosinophilic; DDX includes allergic pneumonitis vs. toxic lung injury  - ID recommendations appreciated  - Pulmonology recommendations appreciated  - ESR (54);  CK (213);  CRP (6.01);  LDH (379);  Lactate (2.5)  - Quantitative IgE level (3203); Crohn's Hx may contribute to elevated level in addition to suspected Dx above  - Fungitell (received)  - Mycoplasma IgM/IgG (received)   - Urine streptococcus and legionella (received)  - Stool studies  - Follow up BRONCHOSCOPY results as OP  - Maintaining SpO2>92%    Acute asthma exacerbation; 2/2 infectious vs. allergic process  - Admits to exposure to pesticide which may have triggered symptoms + smoking  - Switched to PO steroids; will taper  - Continue Symbicort and Albuterol PRN    Crohn's Disease; Chronic  - Not active and receives no treatment as an OP  - Monitoring    Anxiety; likely generalized  - Reports home Duloxetine and Benzo PRN; Continue as inpatient    Back pain; Etiology  - Continue home Gabapentin

## 2019-02-14 NOTE — PROGRESS NOTE ADULT - SUBJECTIVE AND OBJECTIVE BOX
DEANDRA WHITE  39y, Female      OVERNIGHT EVENTS:    no fevers  s/p bronch    VITALS:  T(F): 98.2, Max: 98.2 (02-14-19 @ 07:07)  HR: 90  BP: 112/74  RR: 18Vital Signs Last 24 Hrs  T(C): 36.8 (14 Feb 2019 07:07), Max: 36.8 (14 Feb 2019 07:07)  T(F): 98.2 (14 Feb 2019 07:07), Max: 98.2 (14 Feb 2019 07:07)  HR: 90 (14 Feb 2019 08:47) (88 - 114)  BP: 112/74 (14 Feb 2019 08:47) (105/76 - 127/59)  BP(mean): --  RR: 18 (14 Feb 2019 08:47) (18 - 20)  SpO2: 97% (14 Feb 2019 08:47) (96% - 98%)    TESTS & MEASUREMENTS:                        13.6   16.70 )-----------( 508      ( 13 Feb 2019 17:59 )             41.3     02-13    138  |  96<L>  |  24<H>  ----------------------------<  124<H>  4.3   |  19  |  0.9    Ca    9.5      13 Feb 2019 17:59  Mg     2.2     02-12    TPro  7.0  /  Alb  4.1  /  TBili  <0.2  /  DBili  x   /  AST  10  /  ALT  18  /  AlkPhos  78  02-13    LIVER FUNCTIONS - ( 13 Feb 2019 17:59 )  Alb: 4.1 g/dL / Pro: 7.0 g/dL / ALK PHOS: 78 U/L / ALT: 18 U/L / AST: 10 U/L / GGT: x                   RADIOLOGY & ADDITIONAL TESTS:    ANTIBIOTICS:  doxycycline IVPB 100 milliGRAM(s) IV Intermittent every 12 hours  doxycycline IVPB

## 2019-02-14 NOTE — CHART NOTE - NSCHARTNOTEFT_GEN_A_CORE
DATE OF PROCEDURE: 02/14/2019    PREOPERATIVE DIAGNOSIS:     POSTOPERATIVE DIAGNOSES:       PROCEDURE PERFORMED:  Bronchoscopy,  brushing and washing.         Attending: Dr. Kaiser    CONSENT: After explanining the risk and benefit the consent was obtained from Julienne Parsons (patient)    The  fiberoptic bronchoscope was introduced through mouth.   The right tracheobronchial tree was inspected closely to the level of the subsegmental bronchi. All bronchi are patent with no endobronchial lesions and no mucosal lesions noted.   The left tracheobronchial tree was also patent and intact with normal mucosa.  The procedure was completed and all samples were submitted for appropriate studies.  After adequate clearing of secretions was accomplished, the bronchoscope was removed from the patient and the procedure was ended.   The patient tolerated the procedure well and there were no complications.

## 2019-02-14 NOTE — PROGRESS NOTE ADULT - SUBJECTIVE AND OBJECTIVE BOX
Patient is a 39y old  Female who presents with a chief complaint of Acute eosinophilic pneumonia (13 Feb 2019 10:35)        SUBJECTIVE: s/p bronchoscopy this AM.       REVIEW OF SYSTEMS:  See HPI    PHYSICAL EXAM  Vital Signs Last 24 Hrs  T(C): 36.8 (14 Feb 2019 07:07), Max: 36.8 (14 Feb 2019 07:07)  T(F): 98.2 (14 Feb 2019 07:07), Max: 98.2 (14 Feb 2019 07:07)  HR: 90 (14 Feb 2019 08:47) (88 - 114)  BP: 112/74 (14 Feb 2019 08:47) (105/76 - 127/59)  RR: 18 (14 Feb 2019 08:47) (18 - 20)  SpO2: 97% on RA (14 Feb 2019 08:47) (96% - 98%)    General: In NAD  HEENT: MARION               Lymphatic system: No Cervical LN    Respiratory: Andrea BS heard. minimal expiratory wheezing  Cardiovascular: Regular  Gastrointestinal: Soft. + BS  Musculoskeletal: No clubbing.  moves all extremities.  Full range of motion    Skin: Warm.  Intact  Neurological: No motor or sensory deficit        LABS:                          13.6   16.70 )-----------( 508      ( 13 Feb 2019 17:59 )             41.3                                               02-13    138  |  96<L>  |  24<H>  ----------------------------<  124<H>  4.3   |  19  |  0.9    Ca    9.5      13 Feb 2019 17:59  Mg     2.2     02-12    TPro  7.0  /  Alb  4.1  /  TBili  <0.2  /  DBili  x   /  AST  10  /  ALT  18  /  AlkPhos  78  02-13                                                 CARDIAC MARKERS ( 12 Feb 2019 12:22 )  x     / <0.01 ng/mL / 213 U/L / x     / 3.9 ng/mL                                            LIVER FUNCTIONS - ( 13 Feb 2019 17:59 )  Alb: 4.1 g/dL / Pro: 7.0 g/dL / ALK PHOS: 78 U/L / ALT: 18 U/L / AST: 10 U/L / GGT: x                                                                                                MEDICATIONS  (STANDING):  buDESOnide 160 MICROgram(s)/formoterol 4.5 MICROgram(s) Inhaler 2 Puff(s) Inhalation two times a day  doxycycline IVPB 100 milliGRAM(s) IV Intermittent every 12 hours  doxycycline IVPB      DULoxetine 60 milliGRAM(s) Oral daily  enoxaparin Injectable 40 milliGRAM(s) SubCutaneous every 24 hours  gabapentin 300 milliGRAM(s) Oral two times a day  predniSONE   Tablet 40 milliGRAM(s) Oral daily  sodium chloride 0.9% lock flush 3 milliLiter(s) IV Push every 8 hours    MEDICATIONS  (PRN):  acetaminophen   Tablet .. 650 milliGRAM(s) Oral every 6 hours PRN Temp greater or equal to 38C (100.4F), Moderate Pain (4 - 6)  ALBUTerol    90 MICROgram(s) HFA Inhaler 2 Puff(s) Inhalation every 6 hours PRN Shortness of Breath and/or Wheezing  ALPRAZolam 1 milliGRAM(s) Oral daily PRN Anxiety

## 2019-02-14 NOTE — DISCHARGE NOTE ADULT - PATIENT PORTAL LINK FT
You can access the [facility] Patient Portal, offered by [facility], by registering with the following website: [email or web address]

## 2019-02-14 NOTE — DISCHARGE NOTE ADULT - PLAN OF CARE
Resolution with medical management You presented with shortness of breath, and were found to have infiltrates in both of your longs, consistent with multifocal pneumonia. Infectious disease was on the case and no longer recommend antibiotics. Pulmonology was on the case as well and performed a bronchoscopy. Both sub-specialties would like you to follow up with the results of your inpatient workup as an outpatient. You should follow up with pulmonology in approximately 2 weeks. You will need a repeat Chest Xray in 4-6 weeks after discharge. If your symptoms worsen, please seek medical attention. You should also follow up with your primary care doctor after discharge. Medical management Your asthma was exacerbated by your illness. Please continue your inhalers regularly and avoid smoking as this will help prevent future exacerbation. Please complete the steroid taper prescribed to you as well and follow up with pulmonology after discharge.

## 2019-02-14 NOTE — DISCHARGE NOTE ADULT - CARE PLAN
Principal Discharge DX:	Pneumonia of both lungs due to infectious organism, unspecified part of lung  Goal:	Resolution with medical management  Assessment and plan of treatment:	You presented with shortness of breath, and were found to have infiltrates in both of your longs, consistent with multifocal pneumonia. Infectious disease was on the case and no longer recommend antibiotics. Pulmonology was on the case as well and performed a bronchoscopy. Both sub-specialties would like you to follow up with the results of your inpatient workup as an outpatient. You should follow up with pulmonology in approximately 2 weeks. You will need a repeat Chest Xray in 4-6 weeks after discharge. If your symptoms worsen, please seek medical attention. You should also follow up with your primary care doctor after discharge.  Secondary Diagnosis:	Asthma exacerbation  Goal:	Medical management  Assessment and plan of treatment:	Your asthma was exacerbated by your illness. Please continue your inhalers regularly and avoid smoking as this will help prevent future exacerbation. Please complete the steroid taper prescribed to you as well and follow up with pulmonology after discharge.

## 2019-02-14 NOTE — PROGRESS NOTE ADULT - ASSESSMENT
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

## 2019-02-14 NOTE — PROGRESS NOTE ADULT - ASSESSMENT
This is a 39 year old female with a significant PMHx of Asthma and Crohn's disease who presented with a cc/o of acute onset chest pain, associated with dyspnea. CXR showed BL infiltrates, read as concerning for multifocal pneumonia. ID has been consulted, noting acute eosinophilic PNA [Quant IgE 3203], which is now the current working diagnosis.     ===========================================================  Today/Updates:  Patient went for bronch this morning; will follow up with pulmonology to determine if patient is OK for discharge with OP follow up of results. Will follow up with ID regarding Abx course.  ===========================================================    Acute multifocal pneumonia; atypical/Eosinophilic; DDX includes allergic pneumonitis vs. toxic lung injury  - ID recommendations appreciated  - Pulmonology recommendations appreciated  - ESR (54);  CK (213);  CRP (6.01);  LDH (379);  Lactate (2.5)  - Quantitative IgE level (3203); Crohn's Hx may contribute to elevated level in addition to suspected Dx above  - Fungitell (received)  - Mycoplasma IgM/IgG (received)   - Urine streptococcus and legionella (pending collection)  - Stool studies (pending collection)  - Continue Doxycycline 100mg IV Q12H  - Peak flow  - Bronch performed this morning  - Maintaining SpO2>92%    Acute asthma exacerbation; 2/2 infectious vs. allergic process  - Admits to exposure to pesticide which may have triggered symptoms  - Switched to PO steroids and will follow pulmonology's recommendations  - Continuing Solumedrol 60mg IV daily  - Continue Symbicort and Albuterol PRN    Suspected illicit drug use  Follow up with Urine drug screen; admits to home use of prescribed opiates for back pain    Crohn's Disease; Chronic  - Not active and receives no treatment as an OP  - Monitoring    Anxiety; likely generalized  - Reports home Duloxetine and Benzo PRN; Continue as inpatient    Back pain; Etiology  - Continue home Gabapentin    Electrolyte Imbalances: Follow up repeat labs for 11AM      GI ppx:                                   [X] Not indicated   /   [] Pantoprazole 40mg PO Daily    DVT ppx:  [] Not indicated / [] Heparin 5000mg SubQ / [X] Lovenox 40mg SubQ / [] SCDs    Fluids:   [X] PO  |  [] IVF    Activity:  [] Ad Susan  /  [X] Increase as Tolerated  /  [] OOB w/ assist  /  [] Bed Rest    BMI:  Height (cm): 162.56 (02-11)  Weight (kg): 68 (02-11)  BMI (kg/m2): 25.7 (02-11)    DISPO:  Patient to be discharged when condition(s) optimized.  [X] Home  /  [] SNF  /  [] Long Term       [X] Discussion with patient and/or proxy regarding goals of care.  [X] Discussed Case and Plan with the Medical Attending.    CODE STATUS  [X] FULL   /    [] DNR    Please call Dr. Guerrero [[email or web address]-1] with any questions/consult recs: Spectra #[phone] This is a 39 year old female with a significant PMHx of Asthma and Crohn's disease who presented with a cc/o of acute onset chest pain, associated with dyspnea. CXR showed BL infiltrates, read as concerning for multifocal pneumonia. ID has been consulted, noting acute eosinophilic PNA [Quant IgE 3203], which is now the current working diagnosis.     ===========================================================  Today/Updates:  Patient went for bronch this morning; will follow up with pulmonology in 1-2 weeks for results as OP; D/C with ICS/LABA. No Abx as per ID on D/C.  ===========================================================    Acute multifocal pneumonia; atypical/Eosinophilic; DDX includes allergic pneumonitis vs. toxic lung injury  - ID recommendations appreciated  - Pulmonology recommendations appreciated  - ESR (54);  CK (213);  CRP (6.01);  LDH (379);  Lactate (2.5)  - Quantitative IgE level (3203); Crohn's Hx may contribute to elevated level in addition to suspected Dx above  - Fungitell (received)  - Mycoplasma IgM/IgG (received)   - Urine streptococcus and legionella (pending collection)  - Stool studies (pending collection)  - Continue Doxycycline 100mg IV Q12H  - Peak flow  - Bronch performed this morning  - Maintaining SpO2>92%    Acute asthma exacerbation; 2/2 infectious vs. allergic process  - Admits to exposure to pesticide which may have triggered symptoms  - Switched to PO steroids; will taper  - Continue Symbicort and Albuterol PRN    Suspected illicit drug use  Follow up with Urine drug screen; admits to home use of prescribed opiates for back pain    Crohn's Disease; Chronic  - Not active and receives no treatment as an OP  - Monitoring    Anxiety; likely generalized  - Reports home Duloxetine and Benzo PRN; Continue as inpatient    Back pain; Etiology  - Continue home Gabapentin    Electrolyte Imbalances: Follow up repeat labs for 11AM      GI ppx:                                   [X] Not indicated   /   [] Pantoprazole 40mg PO Daily    DVT ppx:  [] Not indicated / [] Heparin 5000mg SubQ / [X] Lovenox 40mg SubQ / [] SCDs    Fluids:   [X] PO  |  [] IVF    Activity:  [] Ad Ssuan  /  [X] Increase as Tolerated  /  [] OOB w/ assist  /  [] Bed Rest    BMI:  Height (cm): 162.56 (02-11)  Weight (kg): 68 (02-11)  BMI (kg/m2): 25.7 (02-11)    DISPO:  Patient to be discharged when condition(s) optimized.  [X] Home  /  [] SNF  /  [] Long Term       [X] Discussion with patient and/or proxy regarding goals of care.  [X] Discussed Case and Plan with the Medical Attending.    CODE STATUS  [X] FULL   /    [] DNR    Please call Dr. Guerrero [PGY-1] with any questions/consult recs: Spectra #[phone]

## 2019-02-14 NOTE — PROGRESS NOTE ADULT - ASSESSMENT
IMPRESSION:    Asthma exacerbation improved  Smoker  Pneumonia.  RO opportunistic infections VS pneumonitis or toxic lung injury       RECOMMENDATIONS:    Finish ABX  course  Check urine strep and legionella.  Mycoplasma titters and repeat in 6 weeks   LDH. Fungitell   Prednisone taper   Nebs q4 and PRN  Smoking cessation   ICS / LABA upon DC  Can DC from pulm stand point and follow up in 1-2 weeks as outpatient

## 2019-02-14 NOTE — DISCHARGE NOTE ADULT - OTHER SIGNIFICANT FINDINGS
Xray Chest 2 Views PA/Lat (02.13.19 @ 12:29)  PROCEDURE DATE:  02/13/2019      INTERPRETATION:  Clinical History / Reason for exam: Pneumonia    Comparison : Chest radiograph February 12, 2019.    Findings:    Support devices: None.    Cardiac/mediastinum/hilum: Unremarkable.    Lung parenchyma/Pleura: Marked improvement to near complete resolution of the bilateral midlung opacities.    Skeleton/soft tissues: Stable.    Impression:    ·	Marked improvement to near complete resolution of the bilateral midlung opacities.

## 2019-02-15 LAB
ANA TITR SER: NEGATIVE — SIGNIFICANT CHANGE UP
FUNGITELL: <31 PG/ML — SIGNIFICANT CHANGE UP
GRAM STN FLD: SIGNIFICANT CHANGE UP
NIGHT BLUE STAIN TISS: SIGNIFICANT CHANGE UP
SPECIMEN SOURCE: SIGNIFICANT CHANGE UP

## 2019-02-16 LAB
CULTURE RESULTS: SIGNIFICANT CHANGE UP
LEGIONELLA AG UR QL: NEGATIVE — SIGNIFICANT CHANGE UP
S PNEUM AG UR QL: NEGATIVE — SIGNIFICANT CHANGE UP
SPECIMEN SOURCE: SIGNIFICANT CHANGE UP

## 2019-02-17 LAB
CULTURE RESULTS: SIGNIFICANT CHANGE UP
SPECIMEN SOURCE: SIGNIFICANT CHANGE UP

## 2019-02-18 LAB
NON-GYNECOLOGICAL CYTOLOGY STUDY: SIGNIFICANT CHANGE UP
NON-GYNECOLOGICAL CYTOLOGY STUDY: SIGNIFICANT CHANGE UP

## 2019-02-19 LAB
CULTURE RESULTS: SIGNIFICANT CHANGE UP
GBM IGG SER-ACNC: <1 AI — SIGNIFICANT CHANGE UP
SPECIMEN SOURCE: SIGNIFICANT CHANGE UP

## 2019-03-14 LAB
CULTURE RESULTS: SIGNIFICANT CHANGE UP
SPECIMEN SOURCE: SIGNIFICANT CHANGE UP

## 2019-03-16 LAB
CULTURE RESULTS: SIGNIFICANT CHANGE UP
SPECIMEN SOURCE: SIGNIFICANT CHANGE UP

## 2019-04-06 LAB
CULTURE RESULTS: SIGNIFICANT CHANGE UP
SPECIMEN SOURCE: SIGNIFICANT CHANGE UP

## 2019-07-01 ENCOUNTER — OUTPATIENT (OUTPATIENT)
Dept: OUTPATIENT SERVICES | Facility: HOSPITAL | Age: 39
LOS: 1 days | End: 2019-07-01
Payer: MEDICAID

## 2019-07-14 ENCOUNTER — INPATIENT (INPATIENT)
Facility: HOSPITAL | Age: 39
LOS: 3 days | Discharge: HOME | End: 2019-07-18
Attending: HOSPITALIST | Admitting: HOSPITALIST
Payer: MEDICAID

## 2019-07-14 VITALS
DIASTOLIC BLOOD PRESSURE: 56 MMHG | RESPIRATION RATE: 18 BRPM | HEART RATE: 94 BPM | HEIGHT: 64 IN | TEMPERATURE: 98 F | WEIGHT: 175.05 LBS | OXYGEN SATURATION: 98 % | SYSTOLIC BLOOD PRESSURE: 110 MMHG

## 2019-07-14 PROBLEM — K50.90 CROHN'S DISEASE, UNSPECIFIED, WITHOUT COMPLICATIONS: Chronic | Status: ACTIVE | Noted: 2019-02-12

## 2019-07-14 LAB
ANION GAP SERPL CALC-SCNC: 13 MMOL/L — SIGNIFICANT CHANGE UP (ref 7–14)
BASE EXCESS BLDA CALC-SCNC: -3.9 MMOL/L — LOW (ref -2–2)
BASOPHILS # BLD AUTO: 0.04 K/UL — SIGNIFICANT CHANGE UP (ref 0–0.2)
BASOPHILS NFR BLD AUTO: 0.3 % — SIGNIFICANT CHANGE UP (ref 0–1)
BUN SERPL-MCNC: 11 MG/DL — SIGNIFICANT CHANGE UP (ref 10–20)
CALCIUM SERPL-MCNC: 8.9 MG/DL — SIGNIFICANT CHANGE UP (ref 8.5–10.1)
CHLORIDE SERPL-SCNC: 105 MMOL/L — SIGNIFICANT CHANGE UP (ref 98–110)
CO2 SERPL-SCNC: 22 MMOL/L — SIGNIFICANT CHANGE UP (ref 17–32)
CREAT SERPL-MCNC: 0.7 MG/DL — SIGNIFICANT CHANGE UP (ref 0.7–1.5)
EOSINOPHIL # BLD AUTO: 0.28 K/UL — SIGNIFICANT CHANGE UP (ref 0–0.7)
EOSINOPHIL NFR BLD AUTO: 2.4 % — SIGNIFICANT CHANGE UP (ref 0–8)
GLUCOSE SERPL-MCNC: 117 MG/DL — HIGH (ref 70–99)
HCO3 BLDA-SCNC: 21 MMOL/L — LOW (ref 23–27)
HCT VFR BLD CALC: 34.8 % — LOW (ref 37–47)
HGB BLD-MCNC: 11.9 G/DL — LOW (ref 12–16)
IMM GRANULOCYTES NFR BLD AUTO: 0.5 % — HIGH (ref 0.1–0.3)
LACTATE SERPL-SCNC: 1.9 MMOL/L — SIGNIFICANT CHANGE UP (ref 0.5–2.2)
LYMPHOCYTES # BLD AUTO: 1.17 K/UL — LOW (ref 1.2–3.4)
LYMPHOCYTES # BLD AUTO: 10 % — LOW (ref 20.5–51.1)
MCHC RBC-ENTMCNC: 32.3 PG — HIGH (ref 27–31)
MCHC RBC-ENTMCNC: 34.2 G/DL — SIGNIFICANT CHANGE UP (ref 32–37)
MCV RBC AUTO: 94.6 FL — SIGNIFICANT CHANGE UP (ref 81–99)
MONOCYTES # BLD AUTO: 0.28 K/UL — SIGNIFICANT CHANGE UP (ref 0.1–0.6)
MONOCYTES NFR BLD AUTO: 2.4 % — SIGNIFICANT CHANGE UP (ref 1.7–9.3)
NEUTROPHILS # BLD AUTO: 9.89 K/UL — HIGH (ref 1.4–6.5)
NEUTROPHILS NFR BLD AUTO: 84.4 % — HIGH (ref 42.2–75.2)
NRBC # BLD: 0 /100 WBCS — SIGNIFICANT CHANGE UP (ref 0–0)
PCO2 BLDA: 36 MMHG — LOW (ref 38–42)
PH BLDA: 7.37 — LOW (ref 7.38–7.42)
PLATELET # BLD AUTO: 267 K/UL — SIGNIFICANT CHANGE UP (ref 130–400)
PO2 BLDA: 77 MMHG — LOW (ref 78–95)
POTASSIUM SERPL-MCNC: 4.7 MMOL/L — SIGNIFICANT CHANGE UP (ref 3.5–5)
POTASSIUM SERPL-SCNC: 4.7 MMOL/L — SIGNIFICANT CHANGE UP (ref 3.5–5)
RBC # BLD: 3.68 M/UL — LOW (ref 4.2–5.4)
RBC # FLD: 13.2 % — SIGNIFICANT CHANGE UP (ref 11.5–14.5)
SAO2 % BLDA: 95 % — SIGNIFICANT CHANGE UP (ref 94–98)
SODIUM SERPL-SCNC: 140 MMOL/L — SIGNIFICANT CHANGE UP (ref 135–146)
WBC # BLD: 11.72 K/UL — HIGH (ref 4.8–10.8)
WBC # FLD AUTO: 11.72 K/UL — HIGH (ref 4.8–10.8)

## 2019-07-14 PROCEDURE — 71046 X-RAY EXAM CHEST 2 VIEWS: CPT | Mod: 26

## 2019-07-14 PROCEDURE — 99285 EMERGENCY DEPT VISIT HI MDM: CPT

## 2019-07-14 RX ORDER — CEFTRIAXONE 500 MG/1
1000 INJECTION, POWDER, FOR SOLUTION INTRAMUSCULAR; INTRAVENOUS ONCE
Refills: 0 | Status: COMPLETED | OUTPATIENT
Start: 2019-07-14 | End: 2019-07-14

## 2019-07-14 RX ORDER — ONDANSETRON 8 MG/1
4 TABLET, FILM COATED ORAL ONCE
Refills: 0 | Status: COMPLETED | OUTPATIENT
Start: 2019-07-14 | End: 2019-07-14

## 2019-07-14 RX ORDER — IPRATROPIUM/ALBUTEROL SULFATE 18-103MCG
3 AEROSOL WITH ADAPTER (GRAM) INHALATION ONCE
Refills: 0 | Status: COMPLETED | OUTPATIENT
Start: 2019-07-14 | End: 2019-07-14

## 2019-07-14 RX ORDER — GABAPENTIN 400 MG/1
300 CAPSULE ORAL THREE TIMES A DAY
Refills: 0 | Status: DISCONTINUED | OUTPATIENT
Start: 2019-07-14 | End: 2019-07-14

## 2019-07-14 RX ORDER — CEFTRIAXONE 500 MG/1
1000 INJECTION, POWDER, FOR SOLUTION INTRAMUSCULAR; INTRAVENOUS EVERY 24 HOURS
Refills: 0 | Status: DISCONTINUED | OUTPATIENT
Start: 2019-07-14 | End: 2019-07-18

## 2019-07-14 RX ORDER — GABAPENTIN 400 MG/1
600 CAPSULE ORAL THREE TIMES A DAY
Refills: 0 | Status: DISCONTINUED | OUTPATIENT
Start: 2019-07-14 | End: 2019-07-18

## 2019-07-14 RX ORDER — OXYCODONE AND ACETAMINOPHEN 5; 325 MG/1; MG/1
2 TABLET ORAL EVERY 4 HOURS
Refills: 0 | Status: DISCONTINUED | OUTPATIENT
Start: 2019-07-14 | End: 2019-07-18

## 2019-07-14 RX ORDER — CELECOXIB 200 MG/1
200 CAPSULE ORAL
Refills: 0 | Status: DISCONTINUED | OUTPATIENT
Start: 2019-07-14 | End: 2019-07-14

## 2019-07-14 RX ORDER — ENOXAPARIN SODIUM 100 MG/ML
40 INJECTION SUBCUTANEOUS DAILY
Refills: 0 | Status: DISCONTINUED | OUTPATIENT
Start: 2019-07-14 | End: 2019-07-18

## 2019-07-14 RX ORDER — DULOXETINE HYDROCHLORIDE 30 MG/1
60 CAPSULE, DELAYED RELEASE ORAL DAILY
Refills: 0 | Status: DISCONTINUED | OUTPATIENT
Start: 2019-07-14 | End: 2019-07-18

## 2019-07-14 RX ORDER — AZITHROMYCIN 500 MG/1
500 TABLET, FILM COATED ORAL EVERY 24 HOURS
Refills: 0 | Status: DISCONTINUED | OUTPATIENT
Start: 2019-07-14 | End: 2019-07-18

## 2019-07-14 RX ORDER — ACETAMINOPHEN 500 MG
650 TABLET ORAL EVERY 6 HOURS
Refills: 0 | Status: DISCONTINUED | OUTPATIENT
Start: 2019-07-14 | End: 2019-07-14

## 2019-07-14 RX ORDER — GABAPENTIN 400 MG/1
0 CAPSULE ORAL
Qty: 90 | Refills: 0 | DISCHARGE

## 2019-07-14 RX ORDER — LORATADINE 10 MG/1
10 TABLET ORAL DAILY
Refills: 0 | Status: DISCONTINUED | OUTPATIENT
Start: 2019-07-14 | End: 2019-07-18

## 2019-07-14 RX ORDER — CHLORHEXIDINE GLUCONATE 213 G/1000ML
1 SOLUTION TOPICAL
Refills: 0 | Status: DISCONTINUED | OUTPATIENT
Start: 2019-07-14 | End: 2019-07-18

## 2019-07-14 RX ORDER — IPRATROPIUM/ALBUTEROL SULFATE 18-103MCG
3 AEROSOL WITH ADAPTER (GRAM) INHALATION EVERY 6 HOURS
Refills: 0 | Status: DISCONTINUED | OUTPATIENT
Start: 2019-07-14 | End: 2019-07-18

## 2019-07-14 RX ORDER — ALPRAZOLAM 0.25 MG
1 TABLET ORAL AT BEDTIME
Refills: 0 | Status: DISCONTINUED | OUTPATIENT
Start: 2019-07-14 | End: 2019-07-14

## 2019-07-14 RX ORDER — MAGNESIUM SULFATE 500 MG/ML
2 VIAL (ML) INJECTION ONCE
Refills: 0 | Status: COMPLETED | OUTPATIENT
Start: 2019-07-14 | End: 2019-07-14

## 2019-07-14 RX ORDER — ALPRAZOLAM 0.25 MG
1 TABLET ORAL THREE TIMES A DAY
Refills: 0 | Status: DISCONTINUED | OUTPATIENT
Start: 2019-07-14 | End: 2019-07-18

## 2019-07-14 RX ORDER — CELECOXIB 200 MG/1
200 CAPSULE ORAL
Refills: 0 | Status: DISCONTINUED | OUTPATIENT
Start: 2019-07-14 | End: 2019-07-18

## 2019-07-14 RX ORDER — AZITHROMYCIN 500 MG/1
500 TABLET, FILM COATED ORAL ONCE
Refills: 0 | Status: COMPLETED | OUTPATIENT
Start: 2019-07-14 | End: 2019-07-14

## 2019-07-14 RX ADMIN — Medication 3 MILLILITER(S): at 12:48

## 2019-07-14 RX ADMIN — CEFTRIAXONE 100 MILLIGRAM(S): 500 INJECTION, POWDER, FOR SOLUTION INTRAMUSCULAR; INTRAVENOUS at 14:32

## 2019-07-14 RX ADMIN — Medication 2 GRAM(S): at 14:00

## 2019-07-14 RX ADMIN — Medication 60 MILLIGRAM(S): at 12:49

## 2019-07-14 RX ADMIN — ONDANSETRON 4 MILLIGRAM(S): 8 TABLET, FILM COATED ORAL at 14:31

## 2019-07-14 RX ADMIN — GABAPENTIN 600 MILLIGRAM(S): 400 CAPSULE ORAL at 21:13

## 2019-07-14 RX ADMIN — Medication 3 MILLILITER(S): at 19:22

## 2019-07-14 RX ADMIN — Medication 80 MILLIGRAM(S): at 21:14

## 2019-07-14 RX ADMIN — AZITHROMYCIN 500 MILLIGRAM(S): 500 TABLET, FILM COATED ORAL at 15:00

## 2019-07-14 RX ADMIN — AZITHROMYCIN 255 MILLIGRAM(S): 500 TABLET, FILM COATED ORAL at 13:52

## 2019-07-14 RX ADMIN — Medication 50 GRAM(S): at 12:56

## 2019-07-14 NOTE — H&P ADULT - ASSESSMENT
38 y/o female with pmd  asthma and recent diagnoses of eosinophilic pneumonia, cervical radiculopathy presents with shortness of breath and difficulty breathing for 1 weeks time.     # Acute Hypoxic respiratory failure secondary to asthma exacerbation secondary to possibly CAP  - patient requiring 4L NC at this time, if oxygen requirment continues to rise, consider ICU evaluation, patient has never been intubated, one asthma exacerbation in this year  - peak flow monitoring  - duonebs q6h  - solumedrol 80mg q8h   - consider pulmonary evaluation inpatient if worsening symptoms, outpatient if improved on abx/steroids  - c/w ceftriaxone and azithromycin    # Cervical Radiculopathy  -  patient  - c/w oxycodone prn  - c/w gabapentin 300mg 3 times daily  - c/w tylenol prn for moderate pain  - c/w duloxetine    # Anxiety  - alprazolam @ bedtime    Activity: ambulate as tolerated  diet: regular  dvt ppx: lovenox 40mg daily  gi ppx: not indicated  full code  dispo: from home

## 2019-07-14 NOTE — ED PROVIDER NOTE - OBJECTIVE STATEMENT
39yF asthma recent eosinophilic pneumonia p/w SOB x days and cough prod of white/yellow sputum. No fever or CP.  Initially felt like her asthma exacerbation but now feels severe, unrelieved by home alb nebs.  Sx worsened last night but she tried to last until the morning before calling EMS.  EMS found her satting 86% and gave her duoneb x 2.

## 2019-07-14 NOTE — ED ADULT TRIAGE NOTE - CHIEF COMPLAINT QUOTE
as per ems shes been sob since yesterday . today she tried her treatments but not working. when ems arrived on scene her o2 sat was 86%. ems gave two duo neb treatments

## 2019-07-14 NOTE — ED PROVIDER NOTE - CLINICAL SUMMARY MEDICAL DECISION MAKING FREE TEXT BOX
39yF asthma anxiety Crohn's p/w SOB and hypoxia, found to have asthma exacerbation and multifocal pneumonia.  Pt w/ new O2 requirement (down to 80% on RA despite duoneb x 3 and subjective improvement in dyspnea and objective improvement in WOB), treated w/ duonebs, steroids, magnesium and ceftriaxone/azithromycin.  Will adm.

## 2019-07-14 NOTE — H&P ADULT - NSHPSOCIALHISTORY_GEN_ALL_CORE
Smoking/etoh/illicit drugs: denies Smoking/etoh/illicit drugs: denies  FH     Mother had congestive heart failure

## 2019-07-14 NOTE — H&P ADULT - ATTENDING COMMENTS
patient seen and examined , agree with pgy 3 assesment and plan except as indicated above,   GEN Lying in no acute distress  HEENT Pupils equal and reactive to light and accommodationSupple Neck  PULM Expiratory wheezes and rhonchi   CV s1s2 regular rate and rhythm  GI + bowel sounds nontender  EXT no cyanosis or edema  PSYCH awake alert and oriented x 3  INTEG No Lesions  NEURO ZHANG  #Acute hypoxemic respiratory failure secondary to asthma exacerbation secondary to bilateral lower lobe consolidations suspicious for gram negative pneumonia given the recurrent pneumonia and asthma   pulmonary consult dr langley  solumderol 60 q12h   attempt to wean down oxygen   rocephin and azithromycin   nebulizers   cannot obtain peak flow due to dyspnea  #Hyperkalemia repeat potassium level 1  #trace tricuspid regurigtiation   Progress Note Handoff    Pending:  pul consult , cw iv solumderol abx and attempt to wean down oxygena s tolerated  Family discussion: patient is of sound mind and agrees to plan of care    Disposition: Home___ patient seen and examined , agree with pgy 3 assesment and plan except as indicated above,   GEN Lying in no acute distress  HEENT Pupils equal and reactive to light and accommodationSupple Neck  PULM Expiratory wheezes and rhonchi   CV s1s2 regular rate and rhythm  GI + bowel sounds nontender  EXT no cyanosis or edema  PSYCH awake alert and oriented x 3  INTEG No Lesions  NEURO ZHANG  #Acute hypoxemic respiratory failure secondary to asthma exacerbation secondary to bilateral lower lobe consolidations suspicious for gram negative pneumonia given the recurrent pneumonia and asthma , compllicated by elevated ige level Immunoglobulin E Level, Serum Quant: 3203: Test Repeated. IU/mL (02.12.19 @ 20:34)    pulmonary consult dr langley  solumderol 60 q12h   attempt to wean down oxygen   rocephin and azithromycin   nebulizers   cannot obtain peak flow due to dyspnea  #Hyperkalemia repeat potassium level 1  #trace tricuspid regurigtiation   #Crohn's disease stable   Progress Note Handoff    Pending:  pul consult , cw iv solumderol abx and attempt to wean down oxygena s tolerated  Family discussion: patient is of sound mind and agrees to plan of care    Disposition: Home___ patient seen and examined , agree with pgy 3 assesment and plan except as indicated above,   GEN Lying in no acute distress  HEENT Pupils equal and reactive to light and accommodationSupple Neck  PULM Expiratory wheezes and rhonchi   CV s1s2 regular rate and rhythm  GI + bowel sounds nontender  EXT no cyanosis or edema  PSYCH awake alert and oriented x 3  INTEG No Lesions  NEURO ZHANG  #Acute hypoxemic respiratory failure secondary to asthma exacerbation secondary to bilateral lower lobe consolidations suspicious for gram negative pneumonia given the recurrent pneumonia and asthma , compllicated by elevated ige level Immunoglobulin E Level, Serum Quant: 3203: Test Repeated. IU/mL (02.12.19 @ 20:34)    pulmonary consult   solumderol 60 q12h   attempt to wean down oxygen   rocephin and azithromycin   nebulizers   cannot obtain peak flow due to dyspnea  #Hyperkalemia repeat potassium level 1  #trace tricuspid regurigtiation   #Crohn's disease stable   Progress Note Handoff    Pending:  pul consult , cw iv solumderol abx and attempt to wean down oxygena s tolerated  Family discussion: patient is of sound mind and agrees to plan of care    Disposition: Home___

## 2019-07-14 NOTE — H&P ADULT - NSICDXPASTMEDICALHX_GEN_ALL_CORE_FT
PAST MEDICAL HISTORY:  Anxiety     Asthma     Crohn's disease without complication, unspecified gastrointestinal tract location

## 2019-07-14 NOTE — ED ADULT NURSE NOTE - OBJECTIVE STATEMENT
Pt c/o SOB. Patient is a smoker, states she is down to about 3-4 cigarettes a day. Pt states she has not had asthma until she moved to [place]. Pt is A&Ox4. Denies chest pain, palpitations, or fevers.

## 2019-07-14 NOTE — ED PROVIDER NOTE - CARE PLAN
Principal Discharge DX:	Acute respiratory failure with hypoxia  Secondary Diagnosis:	Lung consolidation  Secondary Diagnosis:	Asthma exacerbation

## 2019-07-14 NOTE — H&P ADULT - HISTORY OF PRESENT ILLNESS
40 y/o female with pmd  asthma and recent diagnoses of eosinophilic pneumonia, cervical radiculopathy presents with shortness of breath and difficulty breathing for 1 weeks time.  Patient reports that one week ago symptoms started with the onset of a cough and infection.  She explains that she started to feel sick and have subjective fevers, with no chills a week ago.  This led to a cough with productive yellow sputum that was as per the patient a large amount.  The patient does not recall any recent sick contacts, no abd pain, no chest pain.  She explains that the sob was progressive, worsening over the last week.  The shortness of breath was present both rest and with activity.  She has not had a pulmonologist in her lifetime.  She also reports a recent admission in february which was also due to shortness of breath and pna.  Patient does not have a peak flow level she recalls that is her baseline.  She uses an inhaler at home which this past week has essentially run out as per the patient.

## 2019-07-14 NOTE — ED PROVIDER NOTE - NS ED ROS FT
Constitutional: no fevers/chills, no sick contacts  Eyes: No visual changes, eye pain or discharge. No photophobia  ENMT: No hearing changes, pain, discharge or infections. No sore throat or drooling.  Neck:  No neck pain or stiffness. No limited ROM  Cardiac: +SOB, no edema. No chest pain with exertion.  Respiratory: + cough or respiratory distress. No hemoptysis. + history of asthma or RAD  GI: No nausea, vomiting, diarrhea or abdominal pain  : No dysuria, frequency or burning. No discharge  MS: No myalgia, muscle weakness, joint pain or back pain  Neuro: No headache or weakness. No LOC  Skin: No skin rash  Endo: no diabetes or thyroid dysfunction  Heme: no abnormal bleeding or clotting  Except as documented in the HPI, all other systems are negative

## 2019-07-14 NOTE — ED PROVIDER NOTE - PHYSICAL EXAMINATION
CONSTITUTIONAL: well developed; well nourished; well appearing in no acute distress  HEAD: normocephalic; atraumatic  EYES: no conjunctival injection, no scleral icterus  ENT: no nasal discharge; airway clear.  NECK: supple; non tender. + full passive ROM in all directions  CARD: S1, S2 normal; no murmurs, gallops, or rubs. Regular rate and rhythm  RESP: b/l breath sounds w/ diffuse rhonchi, +tachypnea, hyperventilation, +accessory muscle use  ABD: soft; non-distended; non-tender. No rebound, no guarding, no pulsatile abdominal mass  EXT: moving all extremities spontaneously, normal ROM. No clubbing, cyanosis or edema  SKIN: warm and dry, no lesions noted  NEURO: alert, oriented, CN II-XII grossly intact, motor and sensory grossly intact, speech nonslurred, no focal deficits. GCS 15  PSYCH: calm, cooperative, appropriate, good eye contact, logical thought process, no apparent danger to self or others

## 2019-07-15 LAB
ANION GAP SERPL CALC-SCNC: 14 MMOL/L — SIGNIFICANT CHANGE UP (ref 7–14)
BASOPHILS # BLD AUTO: 0.01 K/UL — SIGNIFICANT CHANGE UP (ref 0–0.2)
BASOPHILS NFR BLD AUTO: 0.1 % — SIGNIFICANT CHANGE UP (ref 0–1)
BUN SERPL-MCNC: 17 MG/DL — SIGNIFICANT CHANGE UP (ref 10–20)
CALCIUM SERPL-MCNC: 8.8 MG/DL — SIGNIFICANT CHANGE UP (ref 8.5–10.1)
CHLORIDE SERPL-SCNC: 106 MMOL/L — SIGNIFICANT CHANGE UP (ref 98–110)
CO2 SERPL-SCNC: 21 MMOL/L — SIGNIFICANT CHANGE UP (ref 17–32)
CREAT SERPL-MCNC: 0.6 MG/DL — LOW (ref 0.7–1.5)
EOSINOPHIL # BLD AUTO: 0 K/UL — SIGNIFICANT CHANGE UP (ref 0–0.7)
EOSINOPHIL NFR BLD AUTO: 0 % — SIGNIFICANT CHANGE UP (ref 0–8)
GLUCOSE SERPL-MCNC: 139 MG/DL — HIGH (ref 70–99)
HCT VFR BLD CALC: 34.4 % — LOW (ref 37–47)
HGB BLD-MCNC: 11.3 G/DL — LOW (ref 12–16)
IMM GRANULOCYTES NFR BLD AUTO: 0.7 % — HIGH (ref 0.1–0.3)
LACTATE SERPL-SCNC: 1.5 MMOL/L — SIGNIFICANT CHANGE UP (ref 0.5–2.2)
LYMPHOCYTES # BLD AUTO: 0.74 K/UL — LOW (ref 1.2–3.4)
LYMPHOCYTES # BLD AUTO: 5.2 % — LOW (ref 20.5–51.1)
MAGNESIUM SERPL-MCNC: 2.3 MG/DL — SIGNIFICANT CHANGE UP (ref 1.8–2.4)
MCHC RBC-ENTMCNC: 31.3 PG — HIGH (ref 27–31)
MCHC RBC-ENTMCNC: 32.8 G/DL — SIGNIFICANT CHANGE UP (ref 32–37)
MCV RBC AUTO: 95.3 FL — SIGNIFICANT CHANGE UP (ref 81–99)
MONOCYTES # BLD AUTO: 0.22 K/UL — SIGNIFICANT CHANGE UP (ref 0.1–0.6)
MONOCYTES NFR BLD AUTO: 1.6 % — LOW (ref 1.7–9.3)
NEUTROPHILS # BLD AUTO: 13.07 K/UL — HIGH (ref 1.4–6.5)
NEUTROPHILS NFR BLD AUTO: 92.4 % — HIGH (ref 42.2–75.2)
NRBC # BLD: 0 /100 WBCS — SIGNIFICANT CHANGE UP (ref 0–0)
PHOSPHATE SERPL-MCNC: 3.2 MG/DL — SIGNIFICANT CHANGE UP (ref 2.1–4.9)
PLATELET # BLD AUTO: 268 K/UL — SIGNIFICANT CHANGE UP (ref 130–400)
POTASSIUM SERPL-MCNC: 5.2 MMOL/L — HIGH (ref 3.5–5)
POTASSIUM SERPL-SCNC: 5.2 MMOL/L — HIGH (ref 3.5–5)
RBC # BLD: 3.61 M/UL — LOW (ref 4.2–5.4)
RBC # FLD: 13.2 % — SIGNIFICANT CHANGE UP (ref 11.5–14.5)
SODIUM SERPL-SCNC: 141 MMOL/L — SIGNIFICANT CHANGE UP (ref 135–146)
WBC # BLD: 14.14 K/UL — HIGH (ref 4.8–10.8)
WBC # FLD AUTO: 14.14 K/UL — HIGH (ref 4.8–10.8)

## 2019-07-15 PROCEDURE — 99223 1ST HOSP IP/OBS HIGH 75: CPT

## 2019-07-15 RX ORDER — CYCLOBENZAPRINE HYDROCHLORIDE 10 MG/1
10 TABLET, FILM COATED ORAL
Refills: 0 | Status: DISCONTINUED | OUTPATIENT
Start: 2019-07-15 | End: 2019-07-18

## 2019-07-15 RX ORDER — PANTOPRAZOLE SODIUM 20 MG/1
40 TABLET, DELAYED RELEASE ORAL
Refills: 0 | Status: DISCONTINUED | OUTPATIENT
Start: 2019-07-15 | End: 2019-07-18

## 2019-07-15 RX ORDER — BUDESONIDE AND FORMOTEROL FUMARATE DIHYDRATE 160; 4.5 UG/1; UG/1
2 AEROSOL RESPIRATORY (INHALATION)
Refills: 0 | Status: DISCONTINUED | OUTPATIENT
Start: 2019-07-15 | End: 2019-07-18

## 2019-07-15 RX ADMIN — OXYCODONE AND ACETAMINOPHEN 2 TABLET(S): 5; 325 TABLET ORAL at 16:17

## 2019-07-15 RX ADMIN — Medication 80 MILLIGRAM(S): at 21:19

## 2019-07-15 RX ADMIN — Medication 80 MILLIGRAM(S): at 13:23

## 2019-07-15 RX ADMIN — Medication 3 MILLILITER(S): at 08:21

## 2019-07-15 RX ADMIN — CYCLOBENZAPRINE HYDROCHLORIDE 10 MILLIGRAM(S): 10 TABLET, FILM COATED ORAL at 17:00

## 2019-07-15 RX ADMIN — DULOXETINE HYDROCHLORIDE 60 MILLIGRAM(S): 30 CAPSULE, DELAYED RELEASE ORAL at 12:30

## 2019-07-15 RX ADMIN — AZITHROMYCIN 255 MILLIGRAM(S): 500 TABLET, FILM COATED ORAL at 13:04

## 2019-07-15 RX ADMIN — Medication 200 MILLIGRAM(S): at 16:59

## 2019-07-15 RX ADMIN — CEFTRIAXONE 100 MILLIGRAM(S): 500 INJECTION, POWDER, FOR SOLUTION INTRAMUSCULAR; INTRAVENOUS at 12:35

## 2019-07-15 RX ADMIN — Medication 200 MILLIGRAM(S): at 23:21

## 2019-07-15 RX ADMIN — Medication 80 MILLIGRAM(S): at 05:34

## 2019-07-15 RX ADMIN — GABAPENTIN 600 MILLIGRAM(S): 400 CAPSULE ORAL at 21:18

## 2019-07-15 RX ADMIN — Medication 3 MILLILITER(S): at 20:11

## 2019-07-15 RX ADMIN — OXYCODONE AND ACETAMINOPHEN 2 TABLET(S): 5; 325 TABLET ORAL at 21:18

## 2019-07-15 RX ADMIN — LORATADINE 10 MILLIGRAM(S): 10 TABLET ORAL at 12:35

## 2019-07-15 RX ADMIN — Medication 1 MILLIGRAM(S): at 20:09

## 2019-07-15 RX ADMIN — GABAPENTIN 600 MILLIGRAM(S): 400 CAPSULE ORAL at 05:34

## 2019-07-15 RX ADMIN — OXYCODONE AND ACETAMINOPHEN 2 TABLET(S): 5; 325 TABLET ORAL at 16:47

## 2019-07-15 RX ADMIN — Medication 3 MILLILITER(S): at 01:31

## 2019-07-15 RX ADMIN — Medication 3 MILLILITER(S): at 16:18

## 2019-07-15 RX ADMIN — GABAPENTIN 600 MILLIGRAM(S): 400 CAPSULE ORAL at 13:23

## 2019-07-15 NOTE — PROGRESS NOTE ADULT - SUBJECTIVE AND OBJECTIVE BOX
DEANDRA WHITE 39y Female  MRN#: [id number]   SUBJECTIVE  Patient is a 39y old Female who presents with a chief complaint of SOB  Currently admitted to medicine with the primary diagnosis of Acute respiratory failure with hypoxia  Today is hospital day 1d, and this morning she reports no overnight events.     OBJECTIVE  PAST MEDICAL & SURGICAL HISTORY  Crohn's disease without complication, unspecified gastrointestinal tract location  Anxiety  Asthma  No significant past surgical history    ALLERGIES:  No Known Allergies    MEDICATIONS:  STANDING MEDICATIONS  ALBUTerol/ipratropium for Nebulization 3 milliLiter(s) Nebulizer every 6 hours  azithromycin  IVPB 500 milliGRAM(s) IV Intermittent every 24 hours  cefTRIAXone   IVPB 1000 milliGRAM(s) IV Intermittent every 24 hours  chlorhexidine 4% Liquid 1 Application(s) Topical <User Schedule>  DULoxetine 60 milliGRAM(s) Oral daily  enoxaparin Injectable 40 milliGRAM(s) SubCutaneous daily  gabapentin 600 milliGRAM(s) Oral three times a day  loratadine 10 milliGRAM(s) Oral daily  methylPREDNISolone sodium succinate Injectable 80 milliGRAM(s) IV Push three times a day    PRN MEDICATIONS  ALPRAZolam 1 milliGRAM(s) Oral three times a day PRN  celecoxib 200 milliGRAM(s) Oral two times a day with meals PRN  guaiFENesin    Syrup 200 milliGRAM(s) Oral every 6 hours PRN  oxyCODONE    5 mG/acetaminophen 325 mG 2 Tablet(s) Oral every 4 hours PRN      VITAL SIGNS: Last 24 Hours  T(C): 35.6 (15 Jul 2019 15:28), Max: 36.7 (15 Jul 2019 08:41)  T(F): 96 (15 Jul 2019 15:28), Max: 98 (15 Jul 2019 08:41)  HR: 88 (15 Jul 2019 15:28) (66 - 88)  BP: 101/60 (15 Jul 2019 15:28) (101/60 - 119/67)  BP(mean): --  RR: 18 (15 Jul 2019 15:28) (18 - 18)  SpO2: 99% (15 Jul 2019 15:28) (96% - 99%)    LABS:                        11.3   14.14 )-----------( 268      ( 15 Jul 2019 05:26 )             34.4     07-15    141  |  106  |  17  ----------------------------<  139<H>  5.2<H>   |  21  |  0.6<L>    Ca    8.8      15 Jul 2019 05:26  Phos  3.2     07-15  Mg     2.3     07-15    ABG - ( 14 Jul 2019 14:31 )  pH, Arterial: 7.37  pH, Blood: x     /  pCO2: 36    /  pO2: 77    / HCO3: 21    / Base Excess: -3.9  /  SaO2: 95          Lactate, Blood: 1.5 mmol/L (07-15-19 @ 05:26)    RADIOLOGY:  < from: Xray Chest 2 Views PA/Lat (07.14.19 @ 13:03) >  Impression:      Bilateral lower lobe predominant hazy consolidations most compatible with   pneumonia.    < end of copied text >    < from: Transthoracic Echocardiogram (02.13.19 @ 14:16) >  Summary:   1. LV Ejection Fraction by Gallardo's Method with a biplane EF of 66 %.   2. Trace tricuspid regurgitation.    < end of copied text >    PHYSICAL EXAM:    GENERAL: NAD, well-developed, AAOx3  HEENT:  Atraumatic, Normocephalic. EOMI, PERRLA, conjunctiva and sclera clear, No JVD  PULMONARY: Clear to auscultation bilaterally; No wheeze  CARDIOVASCULAR: Regular rate and rhythm; No murmurs, rubs, or gallops  GASTROINTESTINAL: Soft, Nontender, Nondistended; Bowel sounds present  MUSCULOSKELETAL:  2+ Peripheral Pulses, No clubbing, cyanosis, or edema  NEUROLOGY: non-focal  SKIN: No rashes or lesions      ADMISSION SUMMARY  Patient is a 39y old Female who presents with a chief complaint of SOB   Currently admitted to medicine with the primary diagnosis of Acute respiratory failure with hypoxia    ASSESSMENT & PLAN    40 y/o female with pmd of asthma and recent diagnoses of eosinophilic pneumonia, cervical radiculopathy presents with shortness of breath and difficulty breathing for 1 weeks time.     #Acute Hypoxic respiratory failure 2/2 b/l lower lobe pneumonia   - Chest X ray showed b/l opacity. Last admission in Feb for pneumonia. Never intubated  - peak flow monitoring  - duonebs q6h and PRN, symbicort BID, solumedrol 80mg q8h   - c/w ceftriaxone and azithromycin  - Urine for legionella and MRSA  - No recent hospital admission or exposure to sick contacts    # Cervical Radiculopathy  -  is following as OP  - c/w oxycodone prn, cyclobenzaprine  - c/w gabapentin 300mg 3 times daily  - c/w tylenol prn for moderate pain  - c/w duloxetine    # Anxiety  - alprazolam @ bedtime    Activity: ambulate as tolerated  diet: regular  dvt ppx: lovenox 40mg daily  gi ppx: protonix  dispo: from home

## 2019-07-16 DIAGNOSIS — Z71.89 OTHER SPECIFIED COUNSELING: ICD-10-CM

## 2019-07-16 PROCEDURE — 71045 X-RAY EXAM CHEST 1 VIEW: CPT | Mod: 26

## 2019-07-16 PROCEDURE — 99233 SBSQ HOSP IP/OBS HIGH 50: CPT

## 2019-07-16 RX ORDER — ALBUTEROL 90 UG/1
2 AEROSOL, METERED ORAL EVERY 6 HOURS
Refills: 0 | Status: DISCONTINUED | OUTPATIENT
Start: 2019-07-16 | End: 2019-07-18

## 2019-07-16 RX ORDER — NICOTINE POLACRILEX 2 MG
1 GUM BUCCAL DAILY
Refills: 0 | Status: DISCONTINUED | OUTPATIENT
Start: 2019-07-16 | End: 2019-07-18

## 2019-07-16 RX ORDER — IPRATROPIUM/ALBUTEROL SULFATE 18-103MCG
3 AEROSOL WITH ADAPTER (GRAM) INHALATION ONCE
Refills: 0 | Status: DISCONTINUED | OUTPATIENT
Start: 2019-07-16 | End: 2019-07-16

## 2019-07-16 RX ORDER — LANOLIN ALCOHOL/MO/W.PET/CERES
5 CREAM (GRAM) TOPICAL AT BEDTIME
Refills: 0 | Status: DISCONTINUED | OUTPATIENT
Start: 2019-07-16 | End: 2019-07-18

## 2019-07-16 RX ORDER — IPRATROPIUM/ALBUTEROL SULFATE 18-103MCG
3 AEROSOL WITH ADAPTER (GRAM) INHALATION EVERY 4 HOURS
Refills: 0 | Status: DISCONTINUED | OUTPATIENT
Start: 2019-07-16 | End: 2019-07-17

## 2019-07-16 RX ADMIN — DULOXETINE HYDROCHLORIDE 60 MILLIGRAM(S): 30 CAPSULE, DELAYED RELEASE ORAL at 11:37

## 2019-07-16 RX ADMIN — BUDESONIDE AND FORMOTEROL FUMARATE DIHYDRATE 2 PUFF(S): 160; 4.5 AEROSOL RESPIRATORY (INHALATION) at 11:37

## 2019-07-16 RX ADMIN — Medication 80 MILLIGRAM(S): at 14:07

## 2019-07-16 RX ADMIN — CEFTRIAXONE 100 MILLIGRAM(S): 500 INJECTION, POWDER, FOR SOLUTION INTRAMUSCULAR; INTRAVENOUS at 11:37

## 2019-07-16 RX ADMIN — BUDESONIDE AND FORMOTEROL FUMARATE DIHYDRATE 2 PUFF(S): 160; 4.5 AEROSOL RESPIRATORY (INHALATION) at 21:08

## 2019-07-16 RX ADMIN — Medication 1 MILLIGRAM(S): at 21:08

## 2019-07-16 RX ADMIN — GABAPENTIN 600 MILLIGRAM(S): 400 CAPSULE ORAL at 14:07

## 2019-07-16 RX ADMIN — Medication 3 MILLILITER(S): at 11:55

## 2019-07-16 RX ADMIN — Medication 3 MILLILITER(S): at 15:50

## 2019-07-16 RX ADMIN — OXYCODONE AND ACETAMINOPHEN 2 TABLET(S): 5; 325 TABLET ORAL at 22:31

## 2019-07-16 RX ADMIN — LORATADINE 10 MILLIGRAM(S): 10 TABLET ORAL at 11:37

## 2019-07-16 RX ADMIN — OXYCODONE AND ACETAMINOPHEN 2 TABLET(S): 5; 325 TABLET ORAL at 19:41

## 2019-07-16 RX ADMIN — AZITHROMYCIN 255 MILLIGRAM(S): 500 TABLET, FILM COATED ORAL at 14:07

## 2019-07-16 RX ADMIN — GABAPENTIN 600 MILLIGRAM(S): 400 CAPSULE ORAL at 21:08

## 2019-07-16 RX ADMIN — Medication 1 MILLIGRAM(S): at 12:23

## 2019-07-16 RX ADMIN — Medication 200 MILLIGRAM(S): at 19:41

## 2019-07-16 RX ADMIN — GABAPENTIN 600 MILLIGRAM(S): 400 CAPSULE ORAL at 05:23

## 2019-07-16 RX ADMIN — PANTOPRAZOLE SODIUM 40 MILLIGRAM(S): 20 TABLET, DELAYED RELEASE ORAL at 05:23

## 2019-07-16 RX ADMIN — OXYCODONE AND ACETAMINOPHEN 2 TABLET(S): 5; 325 TABLET ORAL at 10:36

## 2019-07-16 RX ADMIN — CYCLOBENZAPRINE HYDROCHLORIDE 10 MILLIGRAM(S): 10 TABLET, FILM COATED ORAL at 05:23

## 2019-07-16 RX ADMIN — OXYCODONE AND ACETAMINOPHEN 2 TABLET(S): 5; 325 TABLET ORAL at 11:00

## 2019-07-16 RX ADMIN — CYCLOBENZAPRINE HYDROCHLORIDE 10 MILLIGRAM(S): 10 TABLET, FILM COATED ORAL at 17:11

## 2019-07-16 RX ADMIN — Medication 5 MILLIGRAM(S): at 22:30

## 2019-07-16 NOTE — PROGRESS NOTE ADULT - SUBJECTIVE AND OBJECTIVE BOX
DEANDRA WHITE  39y  [facility] [id number]      Patient is a 39y old  Female who presents with a chief complaint of SOB (15 Jul 2019 16:13)      INTERVAL HPI/OVERNIGHT EVENTS:    improving in dyspnea     REVIEW OF SYSTEMS:  CONSTITUTIONAL: No fever, weight loss, or fatigue  EYES: No eye pain, visual disturbances, or discharge  ENMT:  No difficulty hearing, tinnitus, vertigo; No sinus or throat pain  NECK: No pain or stiffness  BREASTS: No pain, masses, or nipple discharge  RESPIRATORY: Reports less dyspnea   CARDIOVASCULAR: No chest pain, palpitations, dizziness, or leg swelling  GASTROINTESTINAL: No abdominal or epigastric pain. No nausea, vomiting, or hematemesis; No diarrhea or constipation. No melena or hematochezia.  GENITOURINARY: No dysuria, frequency, hematuria, or incontinence  NEUROLOGICAL: No headaches, memory loss, loss of strength, numbness, or tremors  SKIN: No itching, burning, rashes, or lesions   LYMPH NODES: No enlarged glands  ENDOCRINE: No heat or cold intolerance; No hair loss  MUSCULOSKELETAL: No joint pain or swelling; No muscle, back, or extremity pain  PSYCHIATRIC: No depression, anxiety, mood swings, or difficulty sleeping  HEME/LYMPH: No easy bruising, or bleeding gums  ALLERY AND IMMUNOLOGIC: No hives or eczema  FAMILY HISTORY:  No pertinent family history in first degree relatives    T(C): 36.6 (07-16-19 @ 05:07), Max: 36.6 (07-16-19 @ 05:07)  HR: 69 (07-16-19 @ 05:07) (69 - 88)  BP: 123/66 (07-16-19 @ 05:07) (101/60 - 123/66)  RR: 18 (07-16-19 @ 05:07) (18 - 18)  SpO2: 96% (07-16-19 @ 01:37) (96% - 99%)  Wt(kg): --Vital Signs Last 24 Hrs  T(C): 36.6 (16 Jul 2019 05:07), Max: 36.6 (16 Jul 2019 05:07)  T(F): 97.9 (16 Jul 2019 05:07), Max: 97.9 (16 Jul 2019 05:07)  HR: 69 (16 Jul 2019 05:07) (69 - 88)  BP: 123/66 (16 Jul 2019 05:07) (101/60 - 123/66)  BP(mean): --  RR: 18 (16 Jul 2019 05:07) (18 - 18)  SpO2: 96% (16 Jul 2019 01:37) (96% - 99%)    PHYSICAL EXAM:  GENERAL: NAD, well-groomed, well-developed  HEAD:  Atraumatic, Normocephalic  EYES: EOMI, PERRLA, conjunctiva and sclera clear  ENMT: No tonsillar erythema, exudates, or enlargement; Moist mucous membranes, Good dentition, No lesions  NECK: Supple, No JVD, Normal thyroid  NERVOUS SYSTEM:  Alert & Oriented X3, Good concentration; Motor Strength 5/5 B/L upper and lower extremities; DTRs 2+ intact and symmetric  PULM: Expiratory wheezes  CARDIAC: Regular rate and rhythm; No murmurs, rubs, or gallops  GI: Soft, Nontender, Nondistended; Bowel sounds present  EXTREMITIES:  2+ Peripheral Pulses, No clubbing, cyanosis, or edema  LYMPH: No lymphadenopathy noted  SKIN: No rashes or lesions    Consultant(s) Notes Reviewed:  [x ] YES  [ ] NO  Care Discussed with Consultants/Other Providers [ x] YES  [ ] NO    LABS:                            11.3   14.14 )-----------( 268      ( 15 Jul 2019 05:26 )             34.4   07-15    141  |  106  |  17  ----------------------------<  139<H>  5.2<H>   |  21  |  0.6<L>    Ca    8.8      15 Jul 2019 05:26  Phos  3.2     07-15  Mg     2.3     07-15              ALBUTerol/ipratropium for Nebulization 3 milliLiter(s) Nebulizer every 6 hours  ALBUTerol/ipratropium for Nebulization. 3 milliLiter(s) Nebulizer once PRN  ALPRAZolam 1 milliGRAM(s) Oral three times a day PRN  azithromycin  IVPB 500 milliGRAM(s) IV Intermittent every 24 hours  buDESOnide 160 MICROgram(s)/formoterol 4.5 MICROgram(s) Inhaler 2 Puff(s) Inhalation two times a day  cefTRIAXone   IVPB 1000 milliGRAM(s) IV Intermittent every 24 hours  celecoxib 200 milliGRAM(s) Oral two times a day with meals PRN  chlorhexidine 4% Liquid 1 Application(s) Topical <User Schedule>  cyclobenzaprine 10 milliGRAM(s) Oral two times a day  DULoxetine 60 milliGRAM(s) Oral daily  enoxaparin Injectable 40 milliGRAM(s) SubCutaneous daily  gabapentin 600 milliGRAM(s) Oral three times a day  guaiFENesin    Syrup 200 milliGRAM(s) Oral every 6 hours PRN  loratadine 10 milliGRAM(s) Oral daily  methylPREDNISolone sodium succinate Injectable 80 milliGRAM(s) IV Push three times a day  oxyCODONE    5 mG/acetaminophen 325 mG 2 Tablet(s) Oral every 4 hours PRN  pantoprazole    Tablet 40 milliGRAM(s) Oral before breakfast      HEALTH ISSUES - PROBLEM Dx:          Case Discussed with House Staff   47 minutes spent on total encounter; more than 50% of the visit was spent counseling and/or coordinating care by the attending physician.   [employer] [phone]

## 2019-07-16 NOTE — PROGRESS NOTE ADULT - SUBJECTIVE AND OBJECTIVE BOX
DEANDRA WHITE 39y Female      Patient is a 39y old  Female who presents with a chief complaint of SOB (16 Jul 2019 16:38)        INTERVAL HPI/OVERNIGHT EVENTS: No acute events overnight. Patient was seen and evaluated at the bedside. The patient endorses chest tightness and neck pain which is chronic. On 3L NC. Vitals stable.     REVIEW OF SYSTEMS:  CONSTITUTIONAL: No fever, weight loss, +fatigue  EYES: No eye pain, visual disturbances, or discharge  ENMT:  No difficulty hearing, tinnitus, vertigo; No sinus +throat pain  RESPIRATORY:  +cough, +wheezing, chills or hemoptysis; +No shortness of breath  CARDIOVASCULAR: No chest pain, palpitations, dizziness, or leg swelling  GASTROINTESTINAL: No abdominal or epigastric pain. No diarrhea or constipation. No nausea, vomiting, or hematemesis. No melena or hematochezia.  NEUROLOGICAL: No headaches, memory loss, loss of strength, numbness, or tremors  MUSCULOSKELETAL: No joint pain or swelling; +Neck/ UE pain  SKIN: No itching, burning, rashes, or lesions   PSYCHIATRIC: No depression, anxiety, mood swings, or difficulty sleeping      PHYSICAL EXAM:  GENERAL: NAD, well-groomed, well-developed  HEAD:  Atraumatic, Normocephalic  EYES: EOMI, PERRLA, conjunctiva and sclera clear  ENMT: Moist mucous membranes, Good dentition, No lesions  NERVOUS SYSTEM:  Alert & Oriented X3, Good concentration, CN I-XII intact bilaterally  CHEST/LUNG: Wheezing is evident on exam, No rales, rhonchi, or rubs. On 3L NC  HEART: Regular rate and rhythm; No murmurs, rubs, or gallops  ABDOMEN: Soft, Nontender, Nondistended; Bowel sounds present  EXTREMITIES:  2+ Peripheral Pulses, No clubbing, cyanosis, or edema  LYMPH: No lymphadenopathy noted  SKIN: No rashes or lesions      Vital Signs Last 24 Hrs  T(C): 36.2 (16 Jul 2019 14:21), Max: 36.6 (16 Jul 2019 05:07)  T(F): 97.1 (16 Jul 2019 14:21), Max: 97.9 (16 Jul 2019 05:07)  HR: 97 (16 Jul 2019 14:21) (69 - 97)  BP: 114/55 (16 Jul 2019 14:21) (110/65 - 123/66)  BP(mean): --  RR: 20 (16 Jul 2019 14:21) (18 - 20)  SpO2: 96% (16 Jul 2019 01:37) (96% - 96%)      Consultant(s) Notes Reviewed:  [X] YES  [ ] NO  Care Discussed with Consultants/Other Providers [X] YES  [ ] NO  Imaging Personally Reviewed:  [X] YES  [ ] NO      LABS:                        11.3   14.14 )-----------( 268      ( 15 Jul 2019 05:26 )             34.4     07-15    141  |  106  |  17  ----------------------------<  139<H>  5.2<H>   |  21  |  0.6<L>    Ca    8.8      15 Jul 2019 05:26  Phos  3.2     07-15  Mg     2.3     07-15            CAPILLARY BLOOD GLUCOSE

## 2019-07-16 NOTE — CONSULT NOTE ADULT - ASSESSMENT
Asthma exacerbation  Pneumonia  Nicotine dependence    symbicort 160 mg 2 pufs bid  solumedrol 60 mg iv q 12 hours  albuterol/atrovent nebs q 4 hours prn  cont iv abx  obtain ct chest non contrast  smoking cessation counseling as well as counseling re abstinence from electronic cigarettes  f/u pan cultures  dvt/gi px  oob-chair  d/w house staff  no peripheral eosinophilia, no eosinophilia noted on previous bronchoscopy- no evidence of AEP  will follow

## 2019-07-16 NOTE — PROGRESS NOTE ADULT - ASSESSMENT
#Acute hypoxemic respiratory failure secondary to asthma exacerbation secondary to bilateral lower lobe consolidations suspicious for gram negative pneumonia given the recurrent pneumonia and asthma , compllicated by elevated ige level   pulmonary consult   nebulizers , solumedrol rocephin and azithromycin  titrating down oxygen on 3 L   in reviewing literature , eosinophilic pneumonia is a common link between crohn's disease and eosinophilic pneumonia     Sadia Malhotra. Pulmonary manifestations of inflammatory bowel disease. Arch Med Sci. 2015;11(6):1179–1188. doi:[email or web address]  #Hyperkalemia awaiting repeat bmp  #trace tricuspid regurgitation   #Crohn's disease stable   Progress Note Handoff    Pending:  Pulm consult , titrate down oxygen   Family discussion: patient is of sound mind    Disposition: Home___

## 2019-07-16 NOTE — CONSULT NOTE ADULT - SUBJECTIVE AND OBJECTIVE BOX
DEANDRA WHITE  MRN-[id number]    HISTORY OF PRESENT ILLNESS:    38 y/o female with pmd  asthma acervical radiculopathy presents with shortness of breath and difficulty breathing for 1 weeks time.  Patient reports that one week ago symptoms started with the onset of a cough and infection.  She explains that she started to feel sick and have subjective fevers, with no chills a week ago.  This led to a cough with productive yellow sputum that was as per the patient a large amount.  The patient does not recall any recent sick contacts, no abd pain, no chest pain.  She explains that the sob was progressive, worsening over the last week.  The shortness of breath was present both rest and with activity.   She also reports a recent admission in february which was also due to shortness of breath and pna.  Patient does not have a peak flow level she recalls that is her baseline.  She uses an inhaler at home which this past week has essentially run out as per the patient.   Pt states this happens every time she uses her electronic cigarette      PMH/PSH:  PAST MEDICAL & SURGICAL HISTORY:  Crohn's disease without complication, unspecified gastrointestinal tract location  Anxiety  Asthma  No significant past surgical history    ALLERGIES:  Allergies    No Known Allergies    Intolerances      SOCIAL HABITS:  tobacco abuse  electronic cigarette use    FAMILY HISTORY:   FAMILY HISTORY:  No pertinent family history in first degree relatives      REVIEW OF SYSTEM:  Elements of review of systems are negative or non-applicable except as noted above in HPI section.       HOME MEDICATIONS:  acetaminophen 325 mg oral tablet  ALBUTEROL SUL HFA 90 MCG INH  ALPRAZOLAM 1 MG TABLET  DULOXETINE HCL DR 60 MG CAP  FEXOFENADINE  MG TABLET  gabapentin 600 mg oral tablet  MELOXICAM 15 MG TABLET  OXYCODONE-ACETAMINOPHEN     MEDICATIONS:  MEDICATIONS  (STANDING):  ALBUTerol/ipratropium for Nebulization 3 milliLiter(s) Nebulizer every 6 hours  ALBUTerol/ipratropium for Nebulization. 3 milliLiter(s) Nebulizer every 4 hours  azithromycin  IVPB 500 milliGRAM(s) IV Intermittent every 24 hours  buDESOnide 160 MICROgram(s)/formoterol 4.5 MICROgram(s) Inhaler 2 Puff(s) Inhalation two times a day  cefTRIAXone   IVPB 1000 milliGRAM(s) IV Intermittent every 24 hours  chlorhexidine 4% Liquid 1 Application(s) Topical <User Schedule>  cyclobenzaprine 10 milliGRAM(s) Oral two times a day  DULoxetine 60 milliGRAM(s) Oral daily  enoxaparin Injectable 40 milliGRAM(s) SubCutaneous daily  gabapentin 600 milliGRAM(s) Oral three times a day  loratadine 10 milliGRAM(s) Oral daily  methylPREDNISolone sodium succinate Injectable 80 milliGRAM(s) IV Push three times a day  nicotine -   7 mG/24Hr(s) Patch 1 Patch Transdermal daily  pantoprazole    Tablet 40 milliGRAM(s) Oral before breakfast    MEDICATIONS  (PRN):  ALPRAZolam 1 milliGRAM(s) Oral three times a day PRN anxiety  celecoxib 200 milliGRAM(s) Oral two times a day with meals PRN Mild Pain (1 - 3)  guaiFENesin    Syrup 200 milliGRAM(s) Oral every 6 hours PRN Cough  oxyCODONE    5 mG/acetaminophen 325 mG 2 Tablet(s) Oral every 4 hours PRN Severe Pain (7 - 10)        VITALS:   Vital Signs Last 24 Hrs  T(C): 36.2 (16 Jul 2019 14:21), Max: 36.6 (16 Jul 2019 05:07)  T(F): 97.1 (16 Jul 2019 14:21), Max: 97.9 (16 Jul 2019 05:07)  HR: 97 (16 Jul 2019 14:21) (69 - 97)  BP: 114/55 (16 Jul 2019 14:21) (110/65 - 123/66)  BP(mean): --  RR: 20 (16 Jul 2019 14:21) (18 - 20)  SpO2: 96% (16 Jul 2019 01:37) (96% - 96%)  162.56  66.1  25      PHYSICAL EXAM:    GENERAL: NAD  HEAD:  Atraumatic, Normocephalic  NECK: Supple, No JVD  CHEST/LUNG: diffuse wheeze  HEART: Regular rate and rhythm; No murmurs  ABDOMEN: Soft, Nontender, Nondistended  EXTREMITIES:  Good peripheral Pulses, No clubbing, cyanosis, or edema      LABS:                        11.3   14.14 )-----------( 268      ( 15 Jul 2019 05:26 )             34.4     07-15    141  |  106  |  17  ----------------------------<  139<H>  5.2<H>   |  21  |  0.6<L>    Ca    8.8      15 Jul 2019 05:26  Phos  3.2     07-15  Mg     2.3     07-15              DIAGNOSTIC STUDIES:    < from: Xray Chest 1 View- PORTABLE-Routine (07.16.19 @ 07:31) >    Impression:      Interval improvement in the bilateral predominantly lower lobe   consolidations.    < end of copied text >    < from: Xray Chest 2 Views PA/Lat (07.14.19 @ 13:03) >  Impression:      Bilateral lower lobe predominant hazy consolidations most compatible with   pneumonia.    < end of copied text >

## 2019-07-16 NOTE — PROGRESS NOTE ADULT - ASSESSMENT
38 y/o female with pmd of asthma and recent diagnoses of eosinophilic pneumonia, cervical radiculopathy presents with shortness of breath and difficulty breathing for 1 weeks time.     #Acute Hypoxic respiratory failure 2/2 b/l lower lobe pneumonia   - Chest X ray showed b/l opacity. Last admission in Feb for pneumonia. Never intubated  - peak flow monitoring  - duonebs q4h, symbicort BID, solumedrol 60mg q12h   - c/w ceftriaxone and azithromycin  - Urine for legionella and MRSA  - No recent hospital admission or exposure to sick contacts  -Appreciate pulmonary recommendations  -Pt. weaned from 5 to 3L NC today, keep weaning  -Smoking cessation and education counselling     # Cervical Radiculopathy  -  is following as OP  - c/w oxycodone prn, cyclobenzaprine  - c/w gabapentin 300mg 3 times daily  - c/w tylenol prn for moderate pain  - c/w duloxetine    # Anxiety  - alprazolam @ bedtime    Activity: ambulate as tolerated  diet: regular  dvt ppx: lovenox 40mg daily  gi ppx: protonix  dispo: from home

## 2019-07-17 LAB
ALBUMIN SERPL ELPH-MCNC: 3.7 G/DL — SIGNIFICANT CHANGE UP (ref 3.5–5.2)
ALP SERPL-CCNC: 63 U/L — SIGNIFICANT CHANGE UP (ref 30–115)
ALT FLD-CCNC: 27 U/L — SIGNIFICANT CHANGE UP (ref 0–41)
ANION GAP SERPL CALC-SCNC: 13 MMOL/L — SIGNIFICANT CHANGE UP (ref 7–14)
AST SERPL-CCNC: 12 U/L — SIGNIFICANT CHANGE UP (ref 0–41)
BILIRUB SERPL-MCNC: <0.2 MG/DL — SIGNIFICANT CHANGE UP (ref 0.2–1.2)
BUN SERPL-MCNC: 13 MG/DL — SIGNIFICANT CHANGE UP (ref 10–20)
CALCIUM SERPL-MCNC: 8.9 MG/DL — SIGNIFICANT CHANGE UP (ref 8.5–10.1)
CHLORIDE SERPL-SCNC: 104 MMOL/L — SIGNIFICANT CHANGE UP (ref 98–110)
CO2 SERPL-SCNC: 27 MMOL/L — SIGNIFICANT CHANGE UP (ref 17–32)
CREAT SERPL-MCNC: 0.5 MG/DL — LOW (ref 0.7–1.5)
GLUCOSE SERPL-MCNC: 131 MG/DL — HIGH (ref 70–99)
HCT VFR BLD CALC: 35.5 % — LOW (ref 37–47)
HGB BLD-MCNC: 11.8 G/DL — LOW (ref 12–16)
MAGNESIUM SERPL-MCNC: 2.1 MG/DL — SIGNIFICANT CHANGE UP (ref 1.8–2.4)
MCHC RBC-ENTMCNC: 31.9 PG — HIGH (ref 27–31)
MCHC RBC-ENTMCNC: 33.2 G/DL — SIGNIFICANT CHANGE UP (ref 32–37)
MCV RBC AUTO: 95.9 FL — SIGNIFICANT CHANGE UP (ref 81–99)
NRBC # BLD: 0 /100 WBCS — SIGNIFICANT CHANGE UP (ref 0–0)
PHOSPHATE SERPL-MCNC: 3 MG/DL — SIGNIFICANT CHANGE UP (ref 2.1–4.9)
PLATELET # BLD AUTO: 323 K/UL — SIGNIFICANT CHANGE UP (ref 130–400)
POTASSIUM SERPL-MCNC: 4.3 MMOL/L — SIGNIFICANT CHANGE UP (ref 3.5–5)
POTASSIUM SERPL-SCNC: 4.3 MMOL/L — SIGNIFICANT CHANGE UP (ref 3.5–5)
PROT SERPL-MCNC: 6.3 G/DL — SIGNIFICANT CHANGE UP (ref 6–8)
RBC # BLD: 3.7 M/UL — LOW (ref 4.2–5.4)
RBC # FLD: 13.3 % — SIGNIFICANT CHANGE UP (ref 11.5–14.5)
SODIUM SERPL-SCNC: 144 MMOL/L — SIGNIFICANT CHANGE UP (ref 135–146)
WBC # BLD: 8.42 K/UL — SIGNIFICANT CHANGE UP (ref 4.8–10.8)
WBC # FLD AUTO: 8.42 K/UL — SIGNIFICANT CHANGE UP (ref 4.8–10.8)

## 2019-07-17 PROCEDURE — 99233 SBSQ HOSP IP/OBS HIGH 50: CPT

## 2019-07-17 PROCEDURE — 71045 X-RAY EXAM CHEST 1 VIEW: CPT | Mod: 26

## 2019-07-17 PROCEDURE — 71250 CT THORAX DX C-: CPT | Mod: 26

## 2019-07-17 RX ADMIN — CEFTRIAXONE 100 MILLIGRAM(S): 500 INJECTION, POWDER, FOR SOLUTION INTRAMUSCULAR; INTRAVENOUS at 11:58

## 2019-07-17 RX ADMIN — Medication 1 MILLIGRAM(S): at 12:03

## 2019-07-17 RX ADMIN — ALBUTEROL 2 PUFF(S): 90 AEROSOL, METERED ORAL at 01:07

## 2019-07-17 RX ADMIN — OXYCODONE AND ACETAMINOPHEN 2 TABLET(S): 5; 325 TABLET ORAL at 21:20

## 2019-07-17 RX ADMIN — Medication 200 MILLIGRAM(S): at 17:38

## 2019-07-17 RX ADMIN — BUDESONIDE AND FORMOTEROL FUMARATE DIHYDRATE 2 PUFF(S): 160; 4.5 AEROSOL RESPIRATORY (INHALATION) at 19:07

## 2019-07-17 RX ADMIN — BUDESONIDE AND FORMOTEROL FUMARATE DIHYDRATE 2 PUFF(S): 160; 4.5 AEROSOL RESPIRATORY (INHALATION) at 11:59

## 2019-07-17 RX ADMIN — OXYCODONE AND ACETAMINOPHEN 2 TABLET(S): 5; 325 TABLET ORAL at 13:00

## 2019-07-17 RX ADMIN — Medication 3 MILLILITER(S): at 20:03

## 2019-07-17 RX ADMIN — Medication 200 MILLIGRAM(S): at 12:03

## 2019-07-17 RX ADMIN — CYCLOBENZAPRINE HYDROCHLORIDE 10 MILLIGRAM(S): 10 TABLET, FILM COATED ORAL at 05:22

## 2019-07-17 RX ADMIN — CELECOXIB 200 MILLIGRAM(S): 200 CAPSULE ORAL at 13:18

## 2019-07-17 RX ADMIN — Medication 1 MILLIGRAM(S): at 19:06

## 2019-07-17 RX ADMIN — Medication 101.92 MILLIGRAM(S): at 05:22

## 2019-07-17 RX ADMIN — PANTOPRAZOLE SODIUM 40 MILLIGRAM(S): 20 TABLET, DELAYED RELEASE ORAL at 05:22

## 2019-07-17 RX ADMIN — OXYCODONE AND ACETAMINOPHEN 2 TABLET(S): 5; 325 TABLET ORAL at 18:20

## 2019-07-17 RX ADMIN — Medication 3 MILLILITER(S): at 13:36

## 2019-07-17 RX ADMIN — Medication 5 MILLIGRAM(S): at 21:20

## 2019-07-17 RX ADMIN — Medication 200 MILLIGRAM(S): at 01:11

## 2019-07-17 RX ADMIN — DULOXETINE HYDROCHLORIDE 60 MILLIGRAM(S): 30 CAPSULE, DELAYED RELEASE ORAL at 12:00

## 2019-07-17 RX ADMIN — OXYCODONE AND ACETAMINOPHEN 2 TABLET(S): 5; 325 TABLET ORAL at 17:31

## 2019-07-17 RX ADMIN — Medication 101.92 MILLIGRAM(S): at 17:27

## 2019-07-17 RX ADMIN — LORATADINE 10 MILLIGRAM(S): 10 TABLET ORAL at 12:00

## 2019-07-17 RX ADMIN — GABAPENTIN 600 MILLIGRAM(S): 400 CAPSULE ORAL at 05:22

## 2019-07-17 RX ADMIN — Medication 3 MILLILITER(S): at 09:13

## 2019-07-17 RX ADMIN — GABAPENTIN 600 MILLIGRAM(S): 400 CAPSULE ORAL at 21:20

## 2019-07-17 RX ADMIN — GABAPENTIN 600 MILLIGRAM(S): 400 CAPSULE ORAL at 12:00

## 2019-07-17 RX ADMIN — AZITHROMYCIN 255 MILLIGRAM(S): 500 TABLET, FILM COATED ORAL at 12:07

## 2019-07-17 RX ADMIN — OXYCODONE AND ACETAMINOPHEN 2 TABLET(S): 5; 325 TABLET ORAL at 12:03

## 2019-07-17 RX ADMIN — CYCLOBENZAPRINE HYDROCHLORIDE 10 MILLIGRAM(S): 10 TABLET, FILM COATED ORAL at 17:27

## 2019-07-17 RX ADMIN — CELECOXIB 200 MILLIGRAM(S): 200 CAPSULE ORAL at 14:00

## 2019-07-17 NOTE — PROGRESS NOTE ADULT - SUBJECTIVE AND OBJECTIVE BOX
DEANDRA WHITE 39y Female      Patient is a 39y old  Female who presents with a chief complaint of SOB (17 Jul 2019 17:05)        INTERVAL HPI/OVERNIGHT EVENTS: No acute events overnight. Patient was seen and evaluated at the bedside. The patient denies pain. Vitals stable. Ambulated well off of oxygen.      REVIEW OF SYSTEMS:  CONSTITUTIONAL: No fever, weight loss, or fatigue  EYES: No eye pain, visual disturbances, or discharge  ENMT:  No difficulty hearing, tinnitus, vertigo; No sinus or throat pain  RESPIRATORY: +cough, wheezing, chills or hemoptysis; No shortness of breath  CARDIOVASCULAR: No chest pain, palpitations, dizziness, or leg swelling  GASTROINTESTINAL: No abdominal pain. No diarrhea or constipation. No nausea, vomiting, or hematemesis. No melena or hematochezia.  NEUROLOGICAL: No headaches, memory loss, loss of strength, numbness, or tremors  MUSCULOSKELETAL: No joint pain or swelling; No muscle, back, or extremity pain  SKIN: No itching, burning, rashes, or lesions   PSYCHIATRIC: No depression, no anxiety      PHYSICAL EXAM:  GENERAL: NAD, well-groomed, well-developed  HEAD:  Atraumatic, Normocephalic  EYES: EOMI, PERRLA, conjunctiva and sclera clear  ENMT: Moist mucous membranes, No lesions  NERVOUS SYSTEM:  Alert & Oriented X3, CN I-XII intact bilaterally  CHEST/LUNG: Faint wheeze; No rales, rhonchi, or rubs  HEART: Regular rate and rhythm; No murmurs, rubs, or gallops  ABDOMEN: Soft, Nontender, Nondistended; Bowel sounds present  EXTREMITIES:  2+ Peripheral Pulses, No clubbing, cyanosis, or edema  LYMPH: No lymphadenopathy noted  SKIN: No rashes or lesions      Vital Signs Last 24 Hrs  T(C): 36.4 (17 Jul 2019 05:14), Max: 36.5 (16 Jul 2019 20:58)  T(F): 97.5 (17 Jul 2019 05:14), Max: 97.7 (16 Jul 2019 20:58)  HR: 66 (17 Jul 2019 05:14) (66 - 85)  BP: 121/71 (17 Jul 2019 05:14) (112/63 - 121/71)  BP(mean): --  RR: 18 (17 Jul 2019 05:14) (18 - 18)  SpO2: 96% (17 Jul 2019 09:35) (96% - 96%)      Consultant(s) Notes Reviewed:  [X] YES  [ ] NO  Care Discussed with Consultants/Other Providers [X] YES  [ ] NO  Imaging Personally Reviewed:  [X] YES  [ ] NO      LABS:                        11.8   8.42  )-----------( 323      ( 17 Jul 2019 08:50 )             35.5     07-17    144  |  104  |  13  ----------------------------<  131<H>  4.3   |  27  |  0.5<L>    Ca    8.9      17 Jul 2019 08:50  Phos  3.0     07-17  Mg     2.1     07-17    TPro  6.3  /  Alb  3.7  /  TBili  <0.2  /  DBili  x   /  AST  12  /  ALT  27  /  AlkPhos  63  07-17          CAPILLARY BLOOD GLUCOSE

## 2019-07-17 NOTE — PROGRESS NOTE ADULT - ASSESSMENT
acute hypoxic respiratory failure, improved  pneumonia, atypical  asthma exacerbation, improved   tobacco abuse, continuous      monitor SpO2 off oxygen  continue bronchodilators  agree with iv to oral antibiotics and steroids, can start prednisone 40mg daily from am  repeat chest x-ray in 4 weeks as outpatient  and ct chest in 8 to 12 weeks  progressive ambulation  smoking cessation reinforced

## 2019-07-17 NOTE — PROGRESS NOTE ADULT - ATTENDING COMMENTS
Patient seen and examined independently. I agree with the resident's note, physical exam, and plan except as below.  Vital Signs Last 24 Hrs  T(C): 36.4 (17 Jul 2019 05:14), Max: 36.5 (16 Jul 2019 20:58)  T(F): 97.5 (17 Jul 2019 05:14), Max: 97.7 (16 Jul 2019 20:58)  HR: 66 (17 Jul 2019 05:14) (66 - 85)  BP: 121/71 (17 Jul 2019 05:14) (112/63 - 121/71)  BP(mean): --  RR: 18 (17 Jul 2019 05:14) (18 - 18)  SpO2: 96% (17 Jul 2019 09:35) (96% - 96%)  PE  nad  aaox3  [id number]  bilat exp wheeze  soft ntnd+bs  no cce    #asthma exacerbation  #GNR pna  #ground glass opacities  #anxiety  < from: CT Chest No Cont (07.17.19 @ 12:30) >    IMPRESSION:      Bilateral diffuse groundglass and patchy opacities with interlobular   septal thickening. No pleural effusion. This may be seen with atypical   infection, nonspecific inflammation or edema.    < end of copied text >    repeat Ct as outpt  pulm follow up  cont nebs, ICS, prednisone taper in AM if stable  monitor peak flow  possible dc in AM if stable  discussed with Dr Gifford and resident

## 2019-07-17 NOTE — PROGRESS NOTE ADULT - ASSESSMENT
40 y/o female with pmd of asthma and recent diagnoses of eosinophilic pneumonia, cervical radiculopathy presents with shortness of breath and difficulty breathing for 1 weeks time.     #Acute Hypoxic respiratory failure 2/2 b/l lower lobe pneumonia   - Chest X ray showed b/l opacity. Last admission in Feb for pneumonia. Never intubated  - peak flow monitoring, improved to ~430  - duonebs q4h, symbicort BID, solumedrol 60mg q12h   -transitioning off iv steroids to po 40mg tomorrow  - c/w ceftriaxone and azithromycin  - Urine for legionella and MRSA  - No recent hospital admission or exposure to sick contacts  -Appreciate pulmonary recommendations  -Pt. weaned from oxygen today  -Smoking cessation and education counselling reinforced    # Cervical Radiculopathy  -  is following as OP  - c/w oxycodone prn, cyclobenzaprine  - c/w gabapentin 300mg 3 times daily  - c/w tylenol prn for moderate pain  - c/w duloxetine    # Anxiety  - alprazolam @ bedtime    Activity: ambulate as tolerated  diet: regular  dvt ppx: lovenox 40mg daily  gi ppx: protonix  dispo: from home

## 2019-07-17 NOTE — PROGRESS NOTE ADULT - SUBJECTIVE AND OBJECTIVE BOX
DEANDRA WHITE  39y, Female  Allergy: No Known Allergies      LAST 24-Hr EVENTS:  feels better  VITALS:  T(F): 97.5 (07-17-19 @ 05:14), Max: 97.7 (07-16-19 @ 20:58)  HR: 66 (07-17-19 @ 05:14)  BP: 121/71 (07-17-19 @ 05:14) (112/63 - 121/71)  RR: 18 (07-17-19 @ 05:14)  SpO2: 96% (07-17-19 @ 09:35)on low flow oxygen    PHYSICAL EXAM:    GENERAL: NAD, well-developed  HEAD:  Atraumatic, Normocephalic  NECK: Supple, No JVD  CHEST/LUNG: Clear to auscultation bilaterally; No wheeze  HEART: Regular rate and rhythm; No murmurs, rubs, or gallops  ABDOMEN: Soft, Nontender, Nondistended; Bowel sounds present  EXTREMITIES:  2+ Peripheral Pulses, No clubbing, cyanosis, or edema        TESTS & MEASUREMENTS:                          11.8   8.42  )-----------( 323      ( 17 Jul 2019 08:50 )             35.5       07-17    144  |  104  |  13  ----------------------------<  131<H>  4.3   |  27  |  0.5<L>    Ca    8.9      17 Jul 2019 08:50  Phos  3.0     07-17  Mg     2.1     07-17    TPro  6.3  /  Alb  3.7  /  TBili  <0.2  /  DBili  x   /  AST  12  /  ALT  27  /  AlkPhos  63  07-17    LIVER FUNCTIONS - ( 17 Jul 2019 08:50 )  Alb: 3.7 g/dL / Pro: 6.3 g/dL / ALK PHOS: 63 U/L / ALT: 27 U/L / AST: 12 U/L / GGT: x                     ABG & VENT SETTINGS: (when applicable)    n/a    RADIOLOGY & ADDITIONAL TESTS:  < from: Xray Chest 1 View AP/PA (07.17.19 @ 06:49) >    Impression:      There are increased bilateral parenchymal opacities without significant   change.      < end of copied text >  < from: CT Chest No Cont (07.17.19 @ 12:30) >  IMPRESSION:      Bilateral diffuse groundglass and patchy opacities with interlobular   septal thickening. No pleural effusion. This may be seen with atypical   infection, nonspecific inflammation or edema.            < end of copied text >  peak flow 300  MEDICATIONS:  MEDICATIONS  (STANDING):  ALBUTerol/ipratropium for Nebulization 3 milliLiter(s) Nebulizer every 6 hours  azithromycin  IVPB 500 milliGRAM(s) IV Intermittent every 24 hours  buDESOnide 160 MICROgram(s)/formoterol 4.5 MICROgram(s) Inhaler 2 Puff(s) Inhalation two times a day  cefTRIAXone   IVPB 1000 milliGRAM(s) IV Intermittent every 24 hours  chlorhexidine 4% Liquid 1 Application(s) Topical <User Schedule>  cyclobenzaprine 10 milliGRAM(s) Oral two times a day  DULoxetine 60 milliGRAM(s) Oral daily  enoxaparin Injectable 40 milliGRAM(s) SubCutaneous daily  gabapentin 600 milliGRAM(s) Oral three times a day  loratadine 10 milliGRAM(s) Oral daily  melatonin 5 milliGRAM(s) Oral at bedtime  methylPREDNISolone sodium succinate IVPB 60 milliGRAM(s) IV Intermittent every 12 hours  nicotine -   7 mG/24Hr(s) Patch 1 Patch Transdermal daily  pantoprazole    Tablet 40 milliGRAM(s) Oral before breakfast    MEDICATIONS  (PRN):  ALBUTerol    90 MICROgram(s) HFA Inhaler 2 Puff(s) Inhalation every 6 hours PRN Shortness of Breath and/or Wheezing  ALPRAZolam 1 milliGRAM(s) Oral three times a day PRN anxiety  celecoxib 200 milliGRAM(s) Oral two times a day with meals PRN Mild Pain (1 - 3)  guaiFENesin    Syrup 200 milliGRAM(s) Oral every 6 hours PRN Cough  oxyCODONE    5 mG/acetaminophen 325 mG 2 Tablet(s) Oral every 4 hours PRN Severe Pain (7 - 10)

## 2019-07-18 ENCOUNTER — TRANSCRIPTION ENCOUNTER (OUTPATIENT)
Age: 39
End: 2019-07-18

## 2019-07-18 VITALS
DIASTOLIC BLOOD PRESSURE: 70 MMHG | RESPIRATION RATE: 18 BRPM | SYSTOLIC BLOOD PRESSURE: 123 MMHG | TEMPERATURE: 97 F | HEART RATE: 88 BPM

## 2019-07-18 LAB — CA-I BLD-SCNC: 1.19 MMOL/L — SIGNIFICANT CHANGE UP (ref 1.12–1.3)

## 2019-07-18 PROCEDURE — 99239 HOSP IP/OBS DSCHRG MGMT >30: CPT

## 2019-07-18 RX ORDER — ALBUTEROL 90 UG/1
2 AEROSOL, METERED ORAL
Qty: 0 | Refills: 0 | DISCHARGE
Start: 2019-07-18

## 2019-07-18 RX ORDER — CEFPODOXIME PROXETIL 100 MG
1 TABLET ORAL
Qty: 4 | Refills: 0
Start: 2019-07-18 | End: 2019-07-19

## 2019-07-18 RX ORDER — IPRATROPIUM/ALBUTEROL SULFATE 18-103MCG
3 AEROSOL WITH ADAPTER (GRAM) INHALATION
Qty: 0 | Refills: 0 | DISCHARGE
Start: 2019-07-18

## 2019-07-18 RX ORDER — LORATADINE 10 MG/1
1 TABLET ORAL
Qty: 0 | Refills: 0 | DISCHARGE
Start: 2019-07-18

## 2019-07-18 RX ORDER — CYCLOBENZAPRINE HYDROCHLORIDE 10 MG/1
1 TABLET, FILM COATED ORAL
Qty: 0 | Refills: 0 | DISCHARGE
Start: 2019-07-18

## 2019-07-18 RX ADMIN — Medication 3 MILLILITER(S): at 08:07

## 2019-07-18 RX ADMIN — PANTOPRAZOLE SODIUM 40 MILLIGRAM(S): 20 TABLET, DELAYED RELEASE ORAL at 05:26

## 2019-07-18 RX ADMIN — Medication 1 PATCH: at 13:16

## 2019-07-18 RX ADMIN — BUDESONIDE AND FORMOTEROL FUMARATE DIHYDRATE 2 PUFF(S): 160; 4.5 AEROSOL RESPIRATORY (INHALATION) at 08:19

## 2019-07-18 RX ADMIN — Medication 40 MILLIGRAM(S): at 05:26

## 2019-07-18 RX ADMIN — CYCLOBENZAPRINE HYDROCHLORIDE 10 MILLIGRAM(S): 10 TABLET, FILM COATED ORAL at 05:27

## 2019-07-18 RX ADMIN — GABAPENTIN 600 MILLIGRAM(S): 400 CAPSULE ORAL at 05:27

## 2019-07-18 RX ADMIN — OXYCODONE AND ACETAMINOPHEN 2 TABLET(S): 5; 325 TABLET ORAL at 11:53

## 2019-07-18 RX ADMIN — ENOXAPARIN SODIUM 40 MILLIGRAM(S): 100 INJECTION SUBCUTANEOUS at 13:16

## 2019-07-18 RX ADMIN — GABAPENTIN 600 MILLIGRAM(S): 400 CAPSULE ORAL at 13:15

## 2019-07-18 RX ADMIN — LORATADINE 10 MILLIGRAM(S): 10 TABLET ORAL at 13:15

## 2019-07-18 RX ADMIN — DULOXETINE HYDROCHLORIDE 60 MILLIGRAM(S): 30 CAPSULE, DELAYED RELEASE ORAL at 13:15

## 2019-07-18 NOTE — PROGRESS NOTE ADULT - ASSESSMENT
#asthma exacerbation  #GNR pna  #ground glass opacities  #anxiety  < from: CT Chest No Cont (07.17.19 @ 12:30) >    IMPRESSION:      Bilateral diffuse groundglass and patchy opacities with interlobular   septal thickening. No pleural effusion. This may be seen with atypical   infection, nonspecific inflammation or edema.    < end of copied text >    repeat Ct as outpt  pulm follow up  cont nebs, symbicort, prednisone taper 40mg,20mg,10mg x 2 days each  pt needs rx for nebulizer machine and duonebs solution  discussed with Dr Iraheta and resident .  stable to dc home  meds reviewed  pt counselled  time spent 35 mins #asthma exacerbation  #GNR pna  #ground glass opacities  #anxiety  < from: CT Chest No Cont (07.17.19 @ 12:30) >    IMPRESSION:      Bilateral diffuse groundglass and patchy opacities with interlobular   septal thickening. No pleural effusion. This may be seen with atypical   infection, nonspecific inflammation or edema.    < end of copied text >    repeat Ct as outpt  pulm follow up  cont nebs, symbicort, prednisone taper 40mg,20mg,10mg x 2 days each  vantin 200mg q12 for 2 more days  pt needs rx for nebulizer machine and duonebs solution  discussed with Dr Iraheta and resident .  smoking cessation provided   stable to dc home  meds reviewed  pt counselled  time spent 35 mins

## 2019-07-18 NOTE — DISCHARGE NOTE NURSING/CASE MANAGEMENT/SOCIAL WORK - NSDCDPATPORTLINK_GEN_ALL_CORE
You can access the [facility] Patient Portal, offered by [facility], by registering with the following website: [email or web address]

## 2019-07-18 NOTE — DISCHARGE NOTE NURSING/CASE MANAGEMENT/SOCIAL WORK - NSDCPEWEB_GEN_ALL_CORE
[facility] for Tobacco Control website --- [email or web address] website --- [email or web address]

## 2019-07-18 NOTE — PROGRESS NOTE ADULT - SUBJECTIVE AND OBJECTIVE BOX
DEANDRA WHITE  39y, Female  Allergy: No Known Allergies      LAST 24-Hr EVENTS:  cough, sob improved  feels better overall    VITALS:  T(F): 97.2 (07-18-19 @ 13:56), Max: 97.5 (07-18-19 @ 05:00)  HR: 88 (07-18-19 @ 13:56)  BP: 123/70 (07-18-19 @ 13:56) (123/70 - 134/63)  RR: 18 (07-18-19 @ 13:56)  SpO2: --    PHYSICAL EXAM:    GENERAL: NAD  NECK: Supple, No JVD  CHEST/LUNG: minimal wheeze  HEART: Regular rate and rhythm  ABDOMEN: Soft, Nontender, Nondistended  EXTREMITIES:  No clubbing, edema absent        TESTS & MEASUREMENTS:                          11.8   8.42  )-----------( 323      ( 17 Jul 2019 08:50 )             35.5       07-17    144  |  104  |  13  ----------------------------<  131<H>  4.3   |  27  |  0.5<L>    Ca    8.9      17 Jul 2019 08:50  Phos  3.0     07-17  Mg     2.1     07-17    TPro  6.3  /  Alb  3.7  /  TBili  <0.2  /  DBili  x   /  AST  12  /  ALT  27  /  AlkPhos  63  07-17    LIVER FUNCTIONS - ( 17 Jul 2019 08:50 )  Alb: 3.7 g/dL / Pro: 6.3 g/dL / ALK PHOS: 63 U/L / ALT: 27 U/L / AST: 12 U/L / GGT: x                 MEDICATIONS:  MEDICATIONS  (STANDING):  ALBUTerol/ipratropium for Nebulization 3 milliLiter(s) Nebulizer every 6 hours  azithromycin  IVPB 500 milliGRAM(s) IV Intermittent every 24 hours  buDESOnide 160 MICROgram(s)/formoterol 4.5 MICROgram(s) Inhaler 2 Puff(s) Inhalation two times a day  cefTRIAXone   IVPB 1000 milliGRAM(s) IV Intermittent every 24 hours  chlorhexidine 4% Liquid 1 Application(s) Topical <User Schedule>  cyclobenzaprine 10 milliGRAM(s) Oral two times a day  DULoxetine 60 milliGRAM(s) Oral daily  enoxaparin Injectable 40 milliGRAM(s) SubCutaneous daily  gabapentin 600 milliGRAM(s) Oral three times a day  loratadine 10 milliGRAM(s) Oral daily  melatonin 5 milliGRAM(s) Oral at bedtime  nicotine -   7 mG/24Hr(s) Patch 1 Patch Transdermal daily  pantoprazole    Tablet 40 milliGRAM(s) Oral before breakfast  predniSONE   Tablet 40 milliGRAM(s) Oral daily    MEDICATIONS  (PRN):  ALBUTerol    90 MICROgram(s) HFA Inhaler 2 Puff(s) Inhalation every 6 hours PRN Shortness of Breath and/or Wheezing  ALPRAZolam 1 milliGRAM(s) Oral three times a day PRN anxiety  celecoxib 200 milliGRAM(s) Oral two times a day with meals PRN Mild Pain (1 - 3)  guaiFENesin    Syrup 200 milliGRAM(s) Oral every 6 hours PRN Cough  oxyCODONE    5 mG/acetaminophen 325 mG 2 Tablet(s) Oral every 4 hours PRN Severe Pain (7 - 10)

## 2019-07-18 NOTE — DISCHARGE NOTE NURSING/CASE MANAGEMENT/SOCIAL WORK - NSDCPEEMAIL_GEN_ALL_CORE
[facility] for Tobacco Control email [email or web address]

## 2019-07-18 NOTE — DISCHARGE NOTE PROVIDER - CARE PROVIDER_API CALL
Mica Iraheta)  Internal Medicine  [address] [place] [address]  Phone: [phone]  Fax: [phone]  Follow Up Time: 1 week

## 2019-07-18 NOTE — PROGRESS NOTE ADULT - ASSESSMENT
Asthma exacerbation  Pneumonia  Nicotine dependence    symbicort 160 mg 2 pufs bid  po predniosne 40 mg with taper  albuterol/atrovent nebs q 4 hours prn  oral abx-cont 10 days total  repeat ct chest as outpt 6 weeks  smoking cessation reinforced   f/u pan cultures  dvt/gi px  oob-chair  d/w house staff  outpt follow up next week  d/w hospitalist

## 2019-07-18 NOTE — DISCHARGE NOTE PROVIDER - NSDCCPCAREPLAN_GEN_ALL_CORE_FT
PRINCIPAL DISCHARGE DIAGNOSIS  Diagnosis: Asthma exacerbation  Assessment and Plan of Treatment: 1. Asthma Exacerbation secondary to pneumonia   A chest X ray showed bilateral opacities and  barrelled chest. Her last admission was in Feb 2019 for pneumonia. She was never intubated. She had an initial oxygen requirement of 5-6L/min and was gradually weaned down. Her peak flow had been steadily increasing, last measured t be 440. She was prescribed a prednisone taper over several days. She will also continue to take an antibiotic, by mouth, as an outpatient. This would be Vantin 200 mg every 12 hours. She was seen by pulmonologist Dr. Wright in the hospital, a follow-up should be made within 2 weeks of discharge from the hospital. The patient was also counselled on the importance of smoking cessation and the deleterious effects it is having on her health. She will also need a follow-up with a primary care physician  within 1-2 weeks of discharge from the hospital.

## 2019-07-18 NOTE — DISCHARGE NOTE NURSING/CASE MANAGEMENT/SOCIAL WORK - NSDCFUADDAPPT_GEN_ALL_CORE_FT
Please follow up with your primary care physician within 1 week of your discharge from [facility]. You will also need to make an appointment with Dr. Iraheta in pulmonology. Please follow-up with her in 1-2 weeks. Please take all medications as prescribed. If you experience any worsening or recurrence of your symptoms, please call 9-1-1 immediately or report to the nearest Emergency Department. If you have any questions or concerns, please do not hesitate to call the hospital at [phone].

## 2019-07-18 NOTE — DISCHARGE NOTE PROVIDER - NSDCFUADDAPPT_GEN_ALL_CORE_FT
Please follow up with your primary care physician within 1 week of your discharge from [facility]. You will also need to make an appointment with Dr. Iraheta in pulmonology. Please follow-up with her in 1-2 weeks. Please take all medications as prescribed. If you experience any worsening or recurrence of your symptoms, please call 9-1-1 immediately or report to the nearest Emergency Department. If you have any questions or concerns, please do not hesitate to call the hospital at [phone].

## 2019-07-18 NOTE — PROGRESS NOTE ADULT - SUBJECTIVE AND OBJECTIVE BOX
HOSPITALIST ATTENDING NOTE    DEANDRA WHITE  39y Female  [id number]    INTERVAL HPI/OVERNIGHT EVENTS: feels 95% better, sob improved, no wheeze    T(C): 36.4 (07-18-19 @ 05:00), Max: 36.4 (07-18-19 @ 05:00)  HR: 62 (07-18-19 @ 05:00) (62 - 96)  BP: 134/63 (07-18-19 @ 05:00) (128/68 - 134/63)  RR: 18 (07-18-19 @ 05:00) (18 - 18)  SpO2: 96% (07-17-19 @ 09:35) (96% - 96%)  Wt(kg): --      PHYSICAL EXAM:  GENERAL: NAD  HEAD:  Atraumatic, Normocephalic  EYES: EOMI, PERRLA, conjunctiva and sclera clear  NECK: Supple, No JVD, Normal thyroid  NERVOUS SYSTEM:  Alert & Oriented X3, Good concentration; Non-focal- Motor Strength 5/5 B/L upper and lower extremities;  CHEST/LUNG: Clear to ascultation bilaterally; No wheezing,   HEART: Regular rate and rhythm; No murmurs, rubs, or gallops  ABDOMEN: Soft, Nontender, Nondistended; Bowel sounds present  EXTREMITIES: No clubbing, cyanosis, or edema  ambulating well     Consultant(s) Notes Reviewed:  [x ] YES  [ ] NO  Care Discussed with Consultants/Other Providers/ Housestaff [ x] YES  [ ] NO    LABS:                        11.8   8.42  )-----------( 323      ( 17 Jul 2019 08:50 )             35.5     07-17    144  |  104  |  13  ----------------------------<  131<H>  4.3   |  27  |  0.5<L>    Ca    8.9      17 Jul 2019 08:50  Phos  3.0     07-17  Mg     2.1     07-17    TPro  6.3  /  Alb  3.7  /  TBili  <0.2  /  DBili  x   /  AST  12  /  ALT  27  /  AlkPhos  63  07-17          RADIOLOGY & ADDITIONAL TESTS:    Imaging or report Personally Reviewed:  [ ] YES  [ ] NO    Case discussed with resident    Care discussed with pt/family      HEALTH ISSUES - PROBLEM Dx:

## 2019-07-18 NOTE — DISCHARGE NOTE PROVIDER - HOSPITAL COURSE
40 y/o female with pmd of asthma and recent diagnoses of eosinophilic pneumonia, cervical radiculopathy presents with shortness of breath and difficulty breathing for 1 weeks time.         #Acute Hypoxic respiratory failure 2/2 b/l lower lobe pneumonia     - Chest X ray showed b/l opacity. Last admission in Feb for pneumonia. Never intubated    - peak flow monitoring, improved to ~430    - duonebs q4h, symbicort BID, solumedrol 60mg q12h     -transitioning off iv steroids to po 40mg tomorrow    - c/w ceftriaxone and azithromycin    - Urine for legionella and MRSA    - No recent hospital admission or exposure to sick contacts    -Appreciate pulmonary recommendations    -Pt. weaned from oxygen today    -Smoking cessation and education counselling reinforced Ms. Parsons is a 40 y/o female with past medical history of asthma and cervical radiculopathy presented with shortness of breath and difficulty breathing for 1 weeks time. She was found to be hypoxic in the emergency department and was placed on oxygen., received nebulizer treatments, steroids, and antibiotics. She was stabilized and transferred to the medical floor. She improved with each day and was eventually weaned off of oxygen and was able to ambulate without any significant dyspnea. She was seen by a pulmonologist whom recommended smoking cessation and education, which was provided, continued use of an inhaler and steroid taper. The active problems she was treated for in the hospital include:         1. Asthma Exacerbation secondary to pneumonia     A chest X ray showed bilateral opacities and  barrelled chest. Her last admission was in Feb 2019 for pneumonia. She was never intubated. She had an initial oxygen requirement of 5-6L/min and was gradually weaned down. Her peak flow had been steadily increasing, last measured t be 440. She was prescribed a prednisone taper over several days. She will also continue to take an antibiotic, by mouth, as an outpatient. This would be Vantin 200 mg every 12 hours. She was seen by pulmonologist Dr. Wright in the hospital, a follow-up should be made within 2 weeks of discharge from the hospital. The kaylie was also counselled on the importance of smoking cessation and the deleterious effects it is having on her health. She will also need a follow-up with a primary care physicina  within 1-2 weeks of discharge from the hospital.

## 2019-07-24 DIAGNOSIS — F41.9 ANXIETY DISORDER, UNSPECIFIED: ICD-10-CM

## 2019-07-24 DIAGNOSIS — J96.01 ACUTE RESPIRATORY FAILURE WITH HYPOXIA: ICD-10-CM

## 2019-07-24 DIAGNOSIS — K50.90 CROHN'S DISEASE, UNSPECIFIED, WITHOUT COMPLICATIONS: ICD-10-CM

## 2019-07-24 DIAGNOSIS — J45.901 UNSPECIFIED ASTHMA WITH (ACUTE) EXACERBATION: ICD-10-CM

## 2019-07-24 DIAGNOSIS — J15.6 PNEUMONIA DUE TO OTHER GRAM-NEGATIVE BACTERIA: ICD-10-CM

## 2019-07-24 DIAGNOSIS — I07.1 RHEUMATIC TRICUSPID INSUFFICIENCY: ICD-10-CM

## 2019-07-24 DIAGNOSIS — E87.5 HYPERKALEMIA: ICD-10-CM

## 2019-07-24 DIAGNOSIS — M54.12 RADICULOPATHY, CERVICAL REGION: ICD-10-CM

## 2019-07-29 DIAGNOSIS — Z76.89 PERSONS ENCOUNTERING HEALTH SERVICES IN OTHER SPECIFIED CIRCUMSTANCES: ICD-10-CM

## 2019-08-08 ENCOUNTER — EMERGENCY (EMERGENCY)
Facility: HOSPITAL | Age: 39
LOS: 0 days | Discharge: HOME | End: 2019-08-09
Attending: EMERGENCY MEDICINE | Admitting: FAMILY MEDICINE
Payer: MEDICAID

## 2019-08-08 VITALS
TEMPERATURE: 97 F | OXYGEN SATURATION: 98 % | DIASTOLIC BLOOD PRESSURE: 86 MMHG | HEART RATE: 93 BPM | RESPIRATION RATE: 18 BRPM | SYSTOLIC BLOOD PRESSURE: 133 MMHG

## 2019-08-08 VITALS — HEART RATE: 88 BPM

## 2019-08-08 DIAGNOSIS — F41.9 ANXIETY DISORDER, UNSPECIFIED: ICD-10-CM

## 2019-08-08 DIAGNOSIS — R07.9 CHEST PAIN, UNSPECIFIED: ICD-10-CM

## 2019-08-08 DIAGNOSIS — F17.210 NICOTINE DEPENDENCE, CIGARETTES, UNCOMPLICATED: ICD-10-CM

## 2019-08-08 DIAGNOSIS — Z82.49 FAMILY HISTORY OF ISCHEMIC HEART DISEASE AND OTHER DISEASES OF THE CIRCULATORY SYSTEM: ICD-10-CM

## 2019-08-08 DIAGNOSIS — J45.909 UNSPECIFIED ASTHMA, UNCOMPLICATED: ICD-10-CM

## 2019-08-08 LAB
ALBUMIN SERPL ELPH-MCNC: 4 G/DL — SIGNIFICANT CHANGE UP (ref 3.5–5.2)
ALP SERPL-CCNC: 54 U/L — SIGNIFICANT CHANGE UP (ref 30–115)
ALT FLD-CCNC: 31 U/L — SIGNIFICANT CHANGE UP (ref 0–41)
ANION GAP SERPL CALC-SCNC: 11 MMOL/L — SIGNIFICANT CHANGE UP (ref 7–14)
ANION GAP SERPL CALC-SCNC: 12 MMOL/L — SIGNIFICANT CHANGE UP (ref 7–14)
AST SERPL-CCNC: 62 U/L — HIGH (ref 0–41)
BASOPHILS # BLD AUTO: 0.03 K/UL — SIGNIFICANT CHANGE UP (ref 0–0.2)
BASOPHILS # BLD AUTO: 0.06 K/UL — SIGNIFICANT CHANGE UP (ref 0–0.2)
BASOPHILS NFR BLD AUTO: 0.3 % — SIGNIFICANT CHANGE UP (ref 0–1)
BASOPHILS NFR BLD AUTO: 0.4 % — SIGNIFICANT CHANGE UP (ref 0–1)
BILIRUB SERPL-MCNC: <0.2 MG/DL — SIGNIFICANT CHANGE UP (ref 0.2–1.2)
BUN SERPL-MCNC: 12 MG/DL — SIGNIFICANT CHANGE UP (ref 10–20)
BUN SERPL-MCNC: 17 MG/DL — SIGNIFICANT CHANGE UP (ref 10–20)
CALCIUM SERPL-MCNC: 9 MG/DL — SIGNIFICANT CHANGE UP (ref 8.5–10.1)
CALCIUM SERPL-MCNC: 9.3 MG/DL — SIGNIFICANT CHANGE UP (ref 8.5–10.1)
CHLORIDE SERPL-SCNC: 100 MMOL/L — SIGNIFICANT CHANGE UP (ref 98–110)
CHLORIDE SERPL-SCNC: 104 MMOL/L — SIGNIFICANT CHANGE UP (ref 98–110)
CO2 SERPL-SCNC: 21 MMOL/L — SIGNIFICANT CHANGE UP (ref 17–32)
CO2 SERPL-SCNC: 27 MMOL/L — SIGNIFICANT CHANGE UP (ref 17–32)
CREAT SERPL-MCNC: 0.7 MG/DL — SIGNIFICANT CHANGE UP (ref 0.7–1.5)
CREAT SERPL-MCNC: 0.9 MG/DL — SIGNIFICANT CHANGE UP (ref 0.7–1.5)
EOSINOPHIL # BLD AUTO: 0.68 K/UL — SIGNIFICANT CHANGE UP (ref 0–0.7)
EOSINOPHIL # BLD AUTO: 0.83 K/UL — HIGH (ref 0–0.7)
EOSINOPHIL NFR BLD AUTO: 5.5 % — SIGNIFICANT CHANGE UP (ref 0–8)
EOSINOPHIL NFR BLD AUTO: 7.3 % — SIGNIFICANT CHANGE UP (ref 0–8)
GLUCOSE SERPL-MCNC: 92 MG/DL — SIGNIFICANT CHANGE UP (ref 70–99)
GLUCOSE SERPL-MCNC: 93 MG/DL — SIGNIFICANT CHANGE UP (ref 70–99)
HCG SERPL QL: NEGATIVE — SIGNIFICANT CHANGE UP
HCT VFR BLD CALC: 38.1 % — SIGNIFICANT CHANGE UP (ref 37–47)
HCT VFR BLD CALC: 38.4 % — SIGNIFICANT CHANGE UP (ref 37–47)
HGB BLD-MCNC: 12.6 G/DL — SIGNIFICANT CHANGE UP (ref 12–16)
HGB BLD-MCNC: 13.1 G/DL — SIGNIFICANT CHANGE UP (ref 12–16)
IMM GRANULOCYTES NFR BLD AUTO: 0.3 % — SIGNIFICANT CHANGE UP (ref 0.1–0.3)
IMM GRANULOCYTES NFR BLD AUTO: 0.3 % — SIGNIFICANT CHANGE UP (ref 0.1–0.3)
LYMPHOCYTES # BLD AUTO: 26.6 % — SIGNIFICANT CHANGE UP (ref 20.5–51.1)
LYMPHOCYTES # BLD AUTO: 3.35 K/UL — SIGNIFICANT CHANGE UP (ref 1.2–3.4)
LYMPHOCYTES # BLD AUTO: 36 % — SIGNIFICANT CHANGE UP (ref 20.5–51.1)
LYMPHOCYTES # BLD AUTO: 4.03 K/UL — HIGH (ref 1.2–3.4)
MCHC RBC-ENTMCNC: 31.8 PG — HIGH (ref 27–31)
MCHC RBC-ENTMCNC: 33.1 G/DL — SIGNIFICANT CHANGE UP (ref 32–37)
MCHC RBC-ENTMCNC: 33.1 PG — HIGH (ref 27–31)
MCHC RBC-ENTMCNC: 34.1 G/DL — SIGNIFICANT CHANGE UP (ref 32–37)
MCV RBC AUTO: 96.2 FL — SIGNIFICANT CHANGE UP (ref 81–99)
MCV RBC AUTO: 97 FL — SIGNIFICANT CHANGE UP (ref 81–99)
MONOCYTES # BLD AUTO: 0.4 K/UL — SIGNIFICANT CHANGE UP (ref 0.1–0.6)
MONOCYTES # BLD AUTO: 0.75 K/UL — HIGH (ref 0.1–0.6)
MONOCYTES NFR BLD AUTO: 4.3 % — SIGNIFICANT CHANGE UP (ref 1.7–9.3)
MONOCYTES NFR BLD AUTO: 5 % — SIGNIFICANT CHANGE UP (ref 1.7–9.3)
NEUTROPHILS # BLD AUTO: 4.82 K/UL — SIGNIFICANT CHANGE UP (ref 1.4–6.5)
NEUTROPHILS # BLD AUTO: 9.41 K/UL — HIGH (ref 1.4–6.5)
NEUTROPHILS NFR BLD AUTO: 51.8 % — SIGNIFICANT CHANGE UP (ref 42.2–75.2)
NEUTROPHILS NFR BLD AUTO: 62.2 % — SIGNIFICANT CHANGE UP (ref 42.2–75.2)
NRBC # BLD: 0 /100 WBCS — SIGNIFICANT CHANGE UP (ref 0–0)
NRBC # BLD: 0 /100 WBCS — SIGNIFICANT CHANGE UP (ref 0–0)
PLATELET # BLD AUTO: 170 K/UL — SIGNIFICANT CHANGE UP (ref 130–400)
PLATELET # BLD AUTO: 285 K/UL — SIGNIFICANT CHANGE UP (ref 130–400)
POTASSIUM SERPL-MCNC: 4.3 MMOL/L — SIGNIFICANT CHANGE UP (ref 3.5–5)
POTASSIUM SERPL-MCNC: 6.1 MMOL/L — CRITICAL HIGH (ref 3.5–5)
POTASSIUM SERPL-SCNC: 4.3 MMOL/L — SIGNIFICANT CHANGE UP (ref 3.5–5)
POTASSIUM SERPL-SCNC: 6.1 MMOL/L — CRITICAL HIGH (ref 3.5–5)
PROT SERPL-MCNC: 7 G/DL — SIGNIFICANT CHANGE UP (ref 6–8)
RBC # BLD: 3.96 M/UL — LOW (ref 4.2–5.4)
RBC # BLD: 3.96 M/UL — LOW (ref 4.2–5.4)
RBC # FLD: 13.7 % — SIGNIFICANT CHANGE UP (ref 11.5–14.5)
RBC # FLD: 13.9 % — SIGNIFICANT CHANGE UP (ref 11.5–14.5)
SODIUM SERPL-SCNC: 133 MMOL/L — LOW (ref 135–146)
SODIUM SERPL-SCNC: 142 MMOL/L — SIGNIFICANT CHANGE UP (ref 135–146)
TROPONIN T SERPL-MCNC: <0.01 NG/ML — SIGNIFICANT CHANGE UP
TROPONIN T SERPL-MCNC: <0.01 NG/ML — SIGNIFICANT CHANGE UP
WBC # BLD: 15.13 K/UL — HIGH (ref 4.8–10.8)
WBC # BLD: 9.31 K/UL — SIGNIFICANT CHANGE UP (ref 4.8–10.8)
WBC # FLD AUTO: 15.13 K/UL — HIGH (ref 4.8–10.8)
WBC # FLD AUTO: 9.31 K/UL — SIGNIFICANT CHANGE UP (ref 4.8–10.8)

## 2019-08-08 PROCEDURE — 75574 CT ANGIO HRT W/3D IMAGE: CPT | Mod: 26

## 2019-08-08 PROCEDURE — 93010 ELECTROCARDIOGRAM REPORT: CPT | Mod: 76

## 2019-08-08 PROCEDURE — 36000 PLACE NEEDLE IN VEIN: CPT

## 2019-08-08 PROCEDURE — 99236 HOSP IP/OBS SAME DATE HI 85: CPT | Mod: 25

## 2019-08-08 PROCEDURE — 71046 X-RAY EXAM CHEST 2 VIEWS: CPT | Mod: 26

## 2019-08-08 RX ORDER — METOPROLOL TARTRATE 50 MG
100 TABLET ORAL ONCE
Refills: 0 | Status: COMPLETED | OUTPATIENT
Start: 2019-08-08 | End: 2019-08-08

## 2019-08-08 RX ORDER — ATORVASTATIN CALCIUM 80 MG/1
1 TABLET, FILM COATED ORAL
Qty: 30 | Refills: 0
Start: 2019-08-08 | End: 2019-09-06

## 2019-08-08 RX ORDER — ASPIRIN/CALCIUM CARB/MAGNESIUM 324 MG
1 TABLET ORAL
Qty: 30 | Refills: 0
Start: 2019-08-08 | End: 2019-09-06

## 2019-08-08 RX ORDER — FAMOTIDINE 10 MG/ML
20 INJECTION INTRAVENOUS DAILY
Refills: 0 | Status: DISCONTINUED | OUTPATIENT
Start: 2019-08-08 | End: 2019-08-09

## 2019-08-08 RX ORDER — ASPIRIN/CALCIUM CARB/MAGNESIUM 324 MG
325 TABLET ORAL ONCE
Refills: 0 | Status: COMPLETED | OUTPATIENT
Start: 2019-08-08 | End: 2019-08-08

## 2019-08-08 RX ADMIN — FAMOTIDINE 20 MILLIGRAM(S): 10 INJECTION INTRAVENOUS at 06:43

## 2019-08-08 RX ADMIN — FAMOTIDINE 104 MILLIGRAM(S): 10 INJECTION INTRAVENOUS at 06:43

## 2019-08-08 RX ADMIN — Medication 30 MILLILITER(S): at 06:00

## 2019-08-08 RX ADMIN — Medication 100 MILLIGRAM(S): at 09:25

## 2019-08-08 RX ADMIN — Medication 325 MILLIGRAM(S): at 06:06

## 2019-08-08 NOTE — ED PROCEDURE NOTE - ATTENDING CONTRIBUTION TO CARE
.proc I was present for the key portions of the procedure and available as supervisor for the entire procedure as documented.

## 2019-08-08 NOTE — ED ADULT NURSE REASSESSMENT NOTE - NS ED NURSE REASSESS COMMENT FT1
Pt A&Ox4, on observation for chest pain. On CM, no signs of distress, IV intact and WNL.  Will continue to monitor..

## 2019-08-08 NOTE — ED PROVIDER NOTE - OBJECTIVE STATEMENT
40 yo F with hx of asthma, anxiety, active smoker who presents with acute onset of severe, sharp, L sternal chest pain radiating to back which woke her up from sleep at midnight today. No alleviating/aggravating factors. Associated with diaphoresis, nausea, lightheadedness, sob. Sx resolved spontaneously within 10 min. Had same sx yesterday at 3am as well as last month, at which time she was found to have bilateral pneumonia. No fever, cough, palpitations, vomiting, abd pain, leg swelling/pain, hx of clots, malignancy, estrogen use. No hx of stress test or CCTA. +FHx of MI in mother and uncle < 54 yo.

## 2019-08-08 NOTE — ED PROVIDER NOTE - PROGRESS NOTE DETAILS
38 yo F with hx of asthma, anxiety, active smoker who presents with 2 episodes of sharp chest pain, sob, diaphoresis which occurs at rest. Hemodynamically stable in ED. Ordered labs, ekg, cxr. Will reassess. Labs wnl including negative initial trop. Ekg wnl. Cxr unremarkable. HEART score 3 (2+ hx, 1+ RF)Given aspirin 325 mg. Will place in obs for ACS workup.

## 2019-08-08 NOTE — ED PROVIDER NOTE - NS ED ROS FT
GEN:  no fever, no chills, no fatigue  NEURO:  no headache, + lightheadedness  ENT: no sore throat, no runny nose  CV:  + chest pain, + SOB  RESP:  + dyspnea, no cough  GI:  + nausea, no vomiting, no abdominal pain, no diarrhea, no constipation  :  no dysuria, no urinary frequency, no hematuria  MSK:  no joint pain, no edema  SKIN:  no rash, no bruising  HEME: no hematochezia, no melena

## 2019-08-08 NOTE — ED CDU PROVIDER DISPOSITION NOTE - CARE PROVIDER_API CALL
Koby Snowden)  Cardiovascular Disease; Internal Medicine; Interventional Cardiology; Nuclear Cardiology  [address] [place] [address], [place] [address]  Phone: [phone]  Fax: [phone]  Follow Up Time:     Parag Swain)  Cardiovascular Disease; Internal Medicine; Interventional Cardiology  [address] [place] [address]  Phone: [phone]  Fax: [phone]  Follow Up Time:

## 2019-08-08 NOTE — ED PROVIDER NOTE - CLINICAL SUMMARY MEDICAL DECISION MAKING FREE TEXT BOX
Pt with CP, CE X 1 normal, normal CXR and EKG w//o acute changes. Will place in EDOU for continued evaluation.

## 2019-08-08 NOTE — ED ADULT NURSE NOTE - CHPI ED NUR SYMPTOMS NEG
no chills/no congestion/no diaphoresis/no fever/no nausea/no shortness of breath/no dizziness/no syncope/no vomiting/no back pain

## 2019-08-08 NOTE — ED CDU PROVIDER SUBSEQUENT DAY NOTE - ATTENDING CONTRIBUTION TO CARE
38 y/o female with pmh of asthma, anxiety, presents for chest pain sharp associated with sob reports felt like her anxiety, denies any symptoms at this time. No fever, chills, n/v, sob, pleuritic cp, palpitations,  cough, ha/lh/dizziness, numbness/tingling, neck pain/ stiffness, abd pain, diarrhea, constipation, melena/brbpr, urinary symptoms, trauma, weakness, edema, calf pain/swelling/erythema, sick contacts, recent travel or rash. (+) smoker, Family history of cardiac disease.   on exam: wdwn female sitting on stretcher speaking full sentences, no rash, mmm, neck supple. non-tender , radial pulses 2/4 b/l, no jvd, no pain to palpation to chest wall, no pain with movement/ not reproducible, ctabl w/ breath sounds present b/l, no wheezing or crackles, good air exchange, good respiratory effort, no accessory muscle use, no tachypnea, no stridor, bs present throughout all 4 quadrants, abd soft, nd, nt, no rebound tenderness or guarding, no cvat, FROM of ext, no calf pain/swelling/erythema, AAOx3. motor 5/5 and sensation intact throughout upper and lower ext. no focal deficits.  two sets troponin negative, ekg x 2 reviewed, cxr reviewed, ccta done, ground glass opacities improved, ccta with cad rads score 1, pt aware of smoking  cessation follow up with pmd and cardiology, eegaer to go home, copy of results provided, will follow up, denies any symptoms at this time.

## 2019-08-08 NOTE — ED CDU PROVIDER DISPOSITION NOTE - ATTENDING CONTRIBUTION TO CARE
38 y/o female with pmh of asthma, anxiety, presents for chest pain sharp associated with sob reports felt like her anxiety, denies any symptoms at this time. No fever, chills, n/v, sob, pleuritic cp, palpitations,  cough, ha/lh/dizziness, numbness/tingling, neck pain/ stiffness, abd pain, diarrhea, constipation, melena/brbpr, urinary symptoms, trauma, weakness, edema, calf pain/swelling/erythema, sick contacts, recent travel or rash. (+) smoker, Family history of cardiac disease.   on exam: wdwn female sitting on stretcher speaking full sentences, no rash, mmm, neck supple. non-tender , radial pulses 2/4 b/l, no jvd, no pain to palpation to chest wall, no pain with movement/ not reproducible, ctabl w/ breath sounds present b/l, no wheezing or crackles, good air exchange, good respiratory effort, no accessory muscle use, no tachypnea, no stridor, bs present throughout all 4 quadrants, abd soft, nd, nt, no rebound tenderness or guarding, no cvat, FROM of ext, no calf pain/swelling/erythema, AAOx3. motor 5/5 and sensation intact throughout upper and lower ext. no focal deficits.  two sets troponin negative, ekg x 2 reviewed, cxr reviewed, ccta done, ground glass opacities improved, ccta with cad rads score 1, pt aware of smoking  cessation follow up with pmd and cardiology, eager to go home, copy of results provided, will follow up, denies any symptoms at this time.

## 2019-08-08 NOTE — ED CDU PROVIDER INITIAL DAY NOTE - ATTENDING CONTRIBUTION TO CARE
40 y/o female with pmh of asthma, anxiety, presents for chest pain sharp associated with sob reports felt like her anxiety, denies any symptoms at this time. No fever, chills, n/v, sob, pleuritic cp, palpitations,  cough, ha/lh/dizziness, numbness/tingling, neck pain/ stiffness, abd pain, diarrhea, constipation, melena/brbpr, urinary symptoms, trauma, weakness, edema, calf pain/swelling/erythema, sick contacts, recent travel or rash. (+) smoker, Family history of cardiac disease.   on exam: wdwn female sitting on stretcher speaking full sentences, no rash, mmm, neck supple. non-tender , radial pulses 2/4 b/l, no jvd, no pain to palpation to chest wall, no pain with movement/ not reproducible, ctabl w/ breath sounds present b/l, no wheezing or crackles, good air exchange, good respiratory effort, no accessory muscle use, no tachypnea, no stridor, bs present throughout all 4 quadrants, abd soft, nd, nt, no rebound tenderness or guarding, no cvat, FROM of ext, no calf pain/swelling/erythema, AAOx3. motor 5/5 and sensation intact throughout upper and lower ext. no focal deficits.  song swts troponin negative, ekg x 2 reviewed, cxr reviewed, pending ccta. 40 y/o female with pmh of asthma, anxiety, presents for chest pain sharp associated with sob reports felt like her anxiety, denies any symptoms at this time. No fever, chills, n/v, sob, pleuritic cp, palpitations,  cough, ha/lh/dizziness, numbness/tingling, neck pain/ stiffness, abd pain, diarrhea, constipation, melena/brbpr, urinary symptoms, trauma, weakness, edema, calf pain/swelling/erythema, sick contacts, recent travel or rash. (+) smoker, Family history of cardiac disease.   on exam: wdwn female sitting on stretcher speaking full sentences, no rash, mmm, neck supple. non-tender , radial pulses 2/4 b/l, no jvd, no pain to palpation to chest wall, no pain with movement/ not reproducible, ctabl w/ breath sounds present b/l, no wheezing or crackles, good air exchange, good respiratory effort, no accessory muscle use, no tachypnea, no stridor, bs present throughout all 4 quadrants, abd soft, nd, nt, no rebound tenderness or guarding, no cvat, FROM of ext, no calf pain/swelling/erythema, AAOx3. motor 5/5 and sensation intact throughout upper and lower ext. no focal deficits.  two sets troponin negative, ekg x 2 reviewed, cxr reviewed, pending ccta.

## 2019-08-08 NOTE — ED PROVIDER NOTE - ATTENDING CONTRIBUTION TO CARE
I personally evaluated the patient. I reviewed the Resident’s or Physician Assistant’s note (as assigned above), and agree with the findings and plan except as documented in my note.  39 yr F, hx of asthma, + smoker, + famhx of CAD with mom dying from MI in 50s, with complaints of intermittent left sided CP, non radiating, for the past 2 days. Pain improves during the day and recurs at night. Associated diaphoresis, SOB. No abd complaints, no LOC. No aggravating or alleviating factors. VS reviewed, pt non-toxic appearing, NAD. Head ncat, MMM, neck supple, normal ROM, noJVD, normal s1s2 without any murmurs, Lungs CTAB with normal work of breathing. abd +BS, s/nd/nt, extremities wnl, neuro exam grossly normal. No acute skin rashes. Plan is EKG, cardiac monitor, ACS workup and dispo accordingly.

## 2019-08-08 NOTE — ED CDU PROVIDER INITIAL DAY NOTE - OBJECTIVE STATEMENT
38y/o female with pmh of asthma, anxiety, pt. states she woke up today sharp cp associated with diaphoresis and sob. pt. states her sx do not feel like her typical anxiety or asthma. pt. is a smoker. pt. states her mother had chf and mi with cardiac stents.

## 2019-08-08 NOTE — ED CDU PROVIDER SUBSEQUENT DAY NOTE - NS ED ROS FT
Review of Systems:  •	CONSTITUTIONAL - No fever, No diaphoresis, No weight change  •	SKIN - No rash  •	HEMATOLOGIC - No abnormal bleeding or bruising  •	EYES - No eye pain, No blurred vision  •	ENT - No change in hearing, No sore throat, No neck pain, No rhinorrhea, No ear pain  •	RESPIRATORY - No shortness of breath, No cough  •	CARDIAC -+ chest pain, No palpitations  •	GI - No abdominal pain, No nausea, No vomiting, No diarrhea, No constipation, No bright red blood per rectum or melena. No flank pain  •                 - No dysuria, frequency, hematuria.   •	ENDO - No polydypsia, No polyuria, No heat/cold intolerance  •	MUSCULOSKELETAL - No joint paint, No swelling, No back pain  •	NEUROLOGIC - No numbness, No focal weakness, No headache, No dizziness  All other systems negative, unless specified in HPI

## 2019-08-08 NOTE — ED PROVIDER NOTE - PHYSICAL EXAMINATION
CONSTITUTIONAL: well developed, nontoxic appearing, in no acute distress, speaking in full sentences  SKIN: warm, dry, no rash, cap refill < 2 seconds  HEENT: normocephalic, atraumatic, no conjunctival erythema, moist mucous membranes, patent airway  NECK: supple, no masses  CV:  regular rate, regular rhythm, 2+ radial pulses bilaterally  RESP: no wheezes, no rales, no rhonchi, normal work of breathing  ABD: soft, nontender, nondistended, no rebound, no guarding  MSK: normal ROM, no cyanosis, no edema, no calf tenderness  NEURO: alert, oriented, grossly unremarkable  PSYCH: cooperative, appropriate

## 2019-08-08 NOTE — ED CDU PROVIDER SUBSEQUENT DAY NOTE - MEDICAL DECISION MAKING DETAILS
two sets troponin negative, ekg x 2 reviewed, cxr reviewed, ccta done, ground glass opacities improved, ccta with cad rads score 1, pt aware of smoking  cessation follow up with pmd and cardiology, egaer to go home, copy of results provided, will follow up, denies any symptoms at this time.

## 2019-08-26 PROBLEM — Z00.00 ENCOUNTER FOR PREVENTIVE HEALTH EXAMINATION: Status: ACTIVE | Noted: 2019-08-26

## 2019-09-09 ENCOUNTER — OTHER (OUTPATIENT)
Age: 39
End: 2019-09-09

## 2019-09-09 ENCOUNTER — APPOINTMENT (OUTPATIENT)
Dept: CARDIOLOGY | Facility: CLINIC | Age: 39
End: 2019-09-09
Payer: MEDICAID

## 2019-09-09 VITALS
SYSTOLIC BLOOD PRESSURE: 124 MMHG | DIASTOLIC BLOOD PRESSURE: 70 MMHG | HEIGHT: 64 IN | BODY MASS INDEX: 26.29 KG/M2 | WEIGHT: 154 LBS | HEART RATE: 90 BPM

## 2019-09-09 DIAGNOSIS — Z87.39 PERSONAL HISTORY OF OTHER DISEASES OF THE MUSCULOSKELETAL SYSTEM AND CONNECTIVE TISSUE: ICD-10-CM

## 2019-09-09 DIAGNOSIS — Z86.59 PERSONAL HISTORY OF OTHER MENTAL AND BEHAVIORAL DISORDERS: ICD-10-CM

## 2019-09-09 DIAGNOSIS — M54.12 RADICULOPATHY, CERVICAL REGION: ICD-10-CM

## 2019-09-09 DIAGNOSIS — Z78.9 OTHER SPECIFIED HEALTH STATUS: ICD-10-CM

## 2019-09-09 DIAGNOSIS — F17.200 NICOTINE DEPENDENCE, UNSPECIFIED, UNCOMPLICATED: ICD-10-CM

## 2019-09-09 DIAGNOSIS — J45.20 MILD INTERMITTENT ASTHMA, UNCOMPLICATED: ICD-10-CM

## 2019-09-09 PROCEDURE — 99204 OFFICE O/P NEW MOD 45 MIN: CPT

## 2019-09-09 PROCEDURE — 93000 ELECTROCARDIOGRAM COMPLETE: CPT

## 2019-09-09 RX ORDER — ASPIRIN 81 MG
81 TABLET, DELAYED RELEASE (ENTERIC COATED) ORAL DAILY
Refills: 0 | Status: ACTIVE | COMMUNITY

## 2019-09-09 RX ORDER — OXYCODONE AND ACETAMINOPHEN 10; 325 MG/1; MG/1
10-325 TABLET ORAL 4 TIMES DAILY
Refills: 0 | Status: ACTIVE | COMMUNITY

## 2019-09-09 RX ORDER — DULOXETINE HYDROCHLORIDE 60 MG/1
60 CAPSULE, DELAYED RELEASE ORAL DAILY
Refills: 0 | Status: ACTIVE | COMMUNITY

## 2019-09-09 RX ORDER — ALBUTEROL SULFATE 90 UG/1
108 (90 BASE) AEROSOL, METERED RESPIRATORY (INHALATION)
Refills: 0 | Status: ACTIVE | COMMUNITY

## 2019-09-09 RX ORDER — MELOXICAM 15 MG/1
15 TABLET ORAL
Refills: 0 | Status: ACTIVE | COMMUNITY

## 2019-09-09 RX ORDER — GABAPENTIN 600 MG/1
600 TABLET, COATED ORAL 3 TIMES DAILY
Refills: 0 | Status: ACTIVE | COMMUNITY

## 2019-09-09 RX ORDER — ONABOTULINUMTOXINA 100 [USP'U]/1
INJECTION, POWDER, LYOPHILIZED, FOR SOLUTION INTRADERMAL; INTRAMUSCULAR
Refills: 0 | Status: ACTIVE | COMMUNITY

## 2019-09-09 RX ORDER — CYCLOBENZAPRINE HYDROCHLORIDE 10 MG/1
10 TABLET, FILM COATED ORAL TWICE DAILY
Refills: 0 | Status: ACTIVE | COMMUNITY

## 2019-09-09 RX ORDER — BUDESONIDE AND FORMOTEROL FUMARATE DIHYDRATE 160; 4.5 UG/1; UG/1
160-4.5 AEROSOL RESPIRATORY (INHALATION)
Refills: 3 | Status: ACTIVE | COMMUNITY

## 2019-09-09 RX ORDER — ALPRAZOLAM 1 MG/1
1 TABLET ORAL
Refills: 0 | Status: ACTIVE | COMMUNITY

## 2019-09-09 NOTE — ASSESSMENT
[FreeTextEntry1] : Findings and diagnosis discussed with the patient.\par CTA results reviewed with her\par Non-obstructive disease in the LCX.\par \par Plan;\par ASA 81 mg\par Statin Lipitor 20 mg QD - will check lipid panel in 3 months\par Obtain baseline stress echo to assess for ischemic response in the setting of CAD.\par Close f/u with the PMD.\par Prevention discussed.\par Smoking cessation strongly recommended.\par F/u in 3 months.

## 2019-09-09 NOTE — PHYSICAL EXAM
"Mr Landon is a pleasant 76 year old man here with history of a cystoprostatectomy performed on 9/15/2015 with a post-op course complicated with a-fib, prolonged ileus.  However doing really well now.      Neoadjuvant chemotherapy [facility]  TMN Stage: [id number], Original stage was T2  Number of nodes removed: 17  Number of positive nodes: 0  Surgical Margins : negative  Grade: high  Histology: urothelial without secondary histology  Surgery: Radical Cx and Ileal conduit 9/15    No problems with ostomy device.  No blood in urine.  No flank pain.      PE  /72  Pulse 66  Temp 97  F (36.1  C) (Tympanic)  Resp 16  Ht 1.778 m (5' 10\")  Wt 80.7 kg (178 lb)  SpO2 97%  BMI 25.54 kg/m2    Incision sites are healed well.  Worsening bulge around stoma, and now having trouble with leaking of the pouch    CT shows no evidence of disease and no hydro to explain slight Cr rise. Large kidney cyst, non contrast scan    Lab Results   Component Value Date    CR 2.11 09/25/2018    CR 1.89 03/28/2018    CR 1.79 09/12/2017    CR 1.76 03/13/2017    CR 1.73 09/16/2016    CR 1.61 06/07/2016    CR 1.59 03/18/2016    CR 1.43 11/10/2015    CR 1.12 09/28/2015    CR 1.09 09/26/2015         Recently had a colonoscopy and had a little setback from that, but feeling much better now    Assessment history of pT2(T1 after MVAC)[id number] urothelial cancer with no evidence of disease s/p Rad Cx 9/15    Plan:    We will follow up in [place] in ~6 months with CT a/p and cbc, bmp    Imaging and labs per Dr. Pires    Doing well overall     Would recommend tight glucose control given rising SCr in the absence of hydro or obstruction, suggesting this is medical renal disease.    Will refer to General Surgery for parastomal hernia repair    Is scheduled to see the nephrologist for rising SCr    If nausea in the future that wont resolve should consider hyperchloremic metabolic acidosis, which is possible with ileal urinary diversions.    Ruddy Betancourt, " MD  Department of Urology Staff  [facility]       [General Appearance - Well Developed] : well developed [Normal Appearance] : normal appearance [Well Groomed] : well groomed [General Appearance - Well Nourished] : well nourished [No Deformities] : no deformities [General Appearance - In No Acute Distress] : no acute distress [Normal Conjunctiva] : the conjunctiva exhibited no abnormalities [Normal Oral Mucosa] : normal oral mucosa [] : no respiratory distress [Respiration, Rhythm And Depth] : normal respiratory rhythm and effort [Auscultation Breath Sounds / Voice Sounds] : lungs were clear to auscultation bilaterally [Heart Rate And Rhythm] : heart rate and rhythm were normal [Heart Sounds] : normal S1 and S2 [Murmurs] : no murmurs present [Edema] : no peripheral edema present [Bowel Sounds] : normal bowel sounds [Abdomen Soft] : soft [Abdomen Tenderness] : non-tender [Abnormal Walk] : normal gait [Cyanosis, Localized] : no localized cyanosis [Nail Clubbing] : no clubbing of the fingernails [Petechial Hemorrhages (___cm)] : no petechial hemorrhages [Skin Color & Pigmentation] : normal skin color and pigmentation [No Venous Stasis] : no venous stasis [Oriented To Time, Place, And Person] : oriented to person, place, and time [FreeTextEntry1] : anxious

## 2019-09-09 NOTE — REVIEW OF SYSTEMS
[Feeling Fatigued] : feeling fatigued [Blurry Vision] : blurred vision [see HPI] : see HPI [Heartburn] : heartburn [Dysphagia] : dysphagia [Joint Pain] : joint pain [Numbness (Hypesthesia)] : numbness [Tingling (Paresthesia)] : tingling [Anxiety] : anxiety [Negative] : Heme/Lymph

## 2019-09-09 NOTE — HISTORY OF PRESENT ILLNESS
[FreeTextEntry1] : 38 y/o female with asthma, anxiety, presented for evaluation of chest pain. She was in [facility] ER and was found to have nonobstructive disease in the proximal LCX. Normal EF by echo. No syncope. + difficulty swallowing at times. Still gets episodes of chest pain. Was started on ASA and Lipitor in the ER.

## 2019-10-01 PROCEDURE — G9005: CPT

## 2019-11-13 ENCOUNTER — APPOINTMENT (OUTPATIENT)
Dept: CARDIOLOGY | Facility: CLINIC | Age: 39
End: 2019-11-13

## 2019-12-16 ENCOUNTER — APPOINTMENT (OUTPATIENT)
Dept: CARDIOLOGY | Facility: CLINIC | Age: 39
End: 2019-12-16

## 2020-02-07 ENCOUNTER — APPOINTMENT (OUTPATIENT)
Dept: CARDIOLOGY | Facility: CLINIC | Age: 40
End: 2020-02-07
Payer: MEDICAID

## 2020-02-07 VITALS
HEART RATE: 93 BPM | BODY MASS INDEX: 25.23 KG/M2 | WEIGHT: 147 LBS | SYSTOLIC BLOOD PRESSURE: 120 MMHG | DIASTOLIC BLOOD PRESSURE: 70 MMHG

## 2020-02-07 DIAGNOSIS — E78.5 HYPERLIPIDEMIA, UNSPECIFIED: ICD-10-CM

## 2020-02-07 DIAGNOSIS — I25.10 ATHEROSCLEROTIC HEART DISEASE OF NATIVE CORONARY ARTERY W/OUT ANGINA PECTORIS: ICD-10-CM

## 2020-02-07 PROCEDURE — 99214 OFFICE O/P EST MOD 30 MIN: CPT

## 2020-02-07 PROCEDURE — 93000 ELECTROCARDIOGRAM COMPLETE: CPT

## 2020-02-07 NOTE — PHYSICAL EXAM
[General Appearance - Well Developed] : well developed [Well Groomed] : well groomed [General Appearance - Well Nourished] : well nourished [Normal Appearance] : normal appearance [General Appearance - In No Acute Distress] : no acute distress [No Deformities] : no deformities [Normal Conjunctiva] : the conjunctiva exhibited no abnormalities [] : no respiratory distress [Normal Oral Mucosa] : normal oral mucosa [Heart Rate And Rhythm] : heart rate and rhythm were normal [Auscultation Breath Sounds / Voice Sounds] : lungs were clear to auscultation bilaterally [Respiration, Rhythm And Depth] : normal respiratory rhythm and effort [Heart Sounds] : normal S1 and S2 [Murmurs] : no murmurs present [Edema] : no peripheral edema present [Bowel Sounds] : normal bowel sounds [Abdomen Soft] : soft [Abdomen Tenderness] : non-tender [Nail Clubbing] : no clubbing of the fingernails [Abnormal Walk] : normal gait [Cyanosis, Localized] : no localized cyanosis [Petechial Hemorrhages (___cm)] : no petechial hemorrhages [Skin Color & Pigmentation] : normal skin color and pigmentation [No Venous Stasis] : no venous stasis [Oriented To Time, Place, And Person] : oriented to person, place, and time [FreeTextEntry1] : anxious

## 2020-02-07 NOTE — REVIEW OF SYSTEMS
[Blurry Vision] : blurred vision [Feeling Fatigued] : feeling fatigued [see HPI] : see HPI [Heartburn] : heartburn [Joint Pain] : joint pain [Dysphagia] : dysphagia [Numbness (Hypesthesia)] : numbness [Tingling (Paresthesia)] : tingling [Anxiety] : anxiety [Negative] : Heme/Lymph

## 2020-02-07 NOTE — ASSESSMENT
[FreeTextEntry1] : Findings and diagnosis discussed with the patient.\par CTA results reviewed with her\par Non-obstructive disease in the LCX.\par \par Plan;\par C/w ASA 81 mg\par C/w Statin Lipitor 20 mg QD - will check lipid panel - Rx given today\par Obtain stress echo to assess for ischemic response in the setting of CAD.\par Close f/u with the PMD.\par Prevention discussed.\par Smoking cessation strongly recommended.\par F/u after the test.

## 2020-02-07 NOTE — HISTORY OF PRESENT ILLNESS
[FreeTextEntry1] : 41 y/o female with asthma, anxiety, presented for an urgent f/u evaluation of chest pain. She was in [facility] ER and was found to have nonobstructive disease in the proximal LCX. Normal EF by echo. No syncope. + difficulty swallowing at times. Still gets episodes of chest pain. Was started on ASA and Lipitor in the ER. She reports episodes of chest pain last night, nonexertional, but more severe than prior. She did not do the stress test, which was scheduled in September. Did not have repeat labs.

## 2020-03-26 RX ORDER — ATORVASTATIN CALCIUM 20 MG/1
20 TABLET, FILM COATED ORAL
Qty: 90 | Refills: 1 | Status: ACTIVE | COMMUNITY
Start: 1900-01-01 | End: 1900-01-01

## 2020-04-01 ENCOUNTER — APPOINTMENT (OUTPATIENT)
Dept: CARDIOLOGY | Facility: CLINIC | Age: 40
End: 2020-04-01

## 2020-09-01 ENCOUNTER — EMERGENCY (EMERGENCY)
Facility: HOSPITAL | Age: 40
LOS: 0 days | Discharge: HOME | End: 2020-09-01
Attending: EMERGENCY MEDICINE | Admitting: EMERGENCY MEDICINE
Payer: MEDICAID

## 2020-09-01 VITALS
OXYGEN SATURATION: 100 % | RESPIRATION RATE: 20 BRPM | TEMPERATURE: 98 F | DIASTOLIC BLOOD PRESSURE: 66 MMHG | HEIGHT: 64 IN | HEART RATE: 100 BPM | WEIGHT: 134.92 LBS | SYSTOLIC BLOOD PRESSURE: 137 MMHG

## 2020-09-01 DIAGNOSIS — S09.93XA UNSPECIFIED INJURY OF FACE, INITIAL ENCOUNTER: ICD-10-CM

## 2020-09-01 DIAGNOSIS — S10.93XA CONTUSION OF UNSPECIFIED PART OF NECK, INITIAL ENCOUNTER: ICD-10-CM
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
## 2020-09-01 PROCEDURE — 70486 CT MAXILLOFACIAL W/O DYE: CPT | Mod: 26

## 2020-09-01 PROCEDURE — 71045 X-RAY EXAM CHEST 1 VIEW: CPT | Mod: 26

## 2020-09-01 PROCEDURE — 70360 X-RAY EXAM OF NECK: CPT | Mod: 26

## 2020-09-01 PROCEDURE — 70450 CT HEAD/BRAIN W/O DYE: CPT | Mod: 26

## 2020-09-01 PROCEDURE — 99285 EMERGENCY DEPT VISIT HI MDM: CPT

## 2020-09-01 RX ORDER — OXYCODONE AND ACETAMINOPHEN 5; 325 MG/1; MG/1
2 TABLET ORAL ONCE
Refills: 0 | Status: DISCONTINUED | OUTPATIENT
Start: 2020-09-01 | End: 2020-09-01

## 2020-09-01 RX ORDER — KETOROLAC TROMETHAMINE 30 MG/ML
15 SYRINGE (ML) INJECTION ONCE
Refills: 0 | Status: DISCONTINUED | OUTPATIENT
Start: 2020-09-01 | End: 2020-09-01

## 2020-09-01 RX ORDER — OXYCODONE AND ACETAMINOPHEN 5; 325 MG/1; MG/1
1 TABLET ORAL ONCE
Refills: 0 | Status: DISCONTINUED | OUTPATIENT
Start: 2020-09-01 | End: 2020-09-01

## 2020-09-01 RX ADMIN — OXYCODONE AND ACETAMINOPHEN 1 TABLET(S): 5; 325 TABLET ORAL at 10:53

## 2020-09-01 RX ADMIN — OXYCODONE AND ACETAMINOPHEN 1 TABLET(S): 5; 325 TABLET ORAL at 10:23

## 2020-09-01 RX ADMIN — Medication 15 MILLIGRAM(S): at 11:16
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
## 2020-09-01 NOTE — ED PROVIDER NOTE - PATIENT PORTAL LINK FT
You can access the FollowMyHealth Patient Portal offered by [facility] by registering at the following website: [email or web address]. By joining [employer]’s FollowMyHealth portal, you will also be able to view your health information using other applications (apps) compatible with our system.

## 2020-09-01 NOTE — ED ADULT NURSE NOTE - CAS ELECT INFOMATION PROVIDED
DC instructions/Patient discharged by provider with instructions.  Patient leaving ED ambulatory with steady gait.
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
## 2020-09-01 NOTE — ED ADULT NURSE NOTE - NS ED NURSE RECORD ANOTHER HT AND WT
Yes Cheek Interpolation Flap Text: A decision was made to reconstruct the defect utilizing an interpolation axial flap and a staged reconstruction.  A telfa template was made of the defect.  This telfa template was then used to outline the Cheek Interpolation flap.  The donor area for the pedicle flap was then injected with anesthesia.  The flap was excised through the skin and subcutaneous tissue down to the layer of the underlying musculature.  The interpolation flap was carefully excised within this deep plane to maintain its blood supply.  The edges of the donor site were undermined.   The donor site was closed in a primary fashion.  The pedicle was then rotated into position and sutured.  Once the tube was sutured into place, adequate blood supply was confirmed with blanching and refill.  The pedicle was then wrapped with xeroform gauze and dressed appropriately with a telfa and gauze bandage to ensure continued blood supply and protect the attached pedicle.

## 2020-09-01 NOTE — ED ADULT NURSE REASSESSMENT NOTE - NS ED NURSE REASSESS COMMENT FT1
pt noted to be sleeping comfortably in bed in no acute distress with no complaints of pain or discomfort at this time. MD Tabor aware and pain meds being held in the event patient has complaints of pain/requesting meds.

## 2020-09-01 NOTE — ED ADULT NURSE REASSESSMENT NOTE - NS ED NURSE REASSESS COMMENT FT1
Xray at bedside to perform ordered xrays. Patient sleeping soundly, needed physical stimuli to be aroused. Patient able to participate in moving for necessary views with no complaints of pain or discomfort. MD Leander vitale.

## 2020-09-01 NOTE — ED PROVIDER NOTE - PHYSICAL EXAMINATION
CONSTITUTIONAL: Well-developed; well-nourished; in no acute distress, speaking in full sentences, moving all extremities, GCS 15  SKIN: warm, dry  HEAD: Normocephalic; atraumatic  EYES: PERRL, EOMI, no conjunctival erythema  ENT: No nasal discharge; airway clear, mucous membranes moist, no septal hematoma. L jaw TTP, unable to pass bite test.  NECK: Supple; non tender, L neck with contusion marks  CARD: +S1, S2 no murmurs, gallops, or rubs. Regular rate and rhythm. radial 2+, no chest wall tenderness or crepitus, no erythema, edema, ecchymosis  RESP: No wheezes, rales or rhonchi. CTABL  ABD: soft ntnd, no rebound, no guarding, no rigidity  BACK: no midline spinal tenderness, no step offs, no ecchymosis, edema or erythema  EXT: moves all extremities, ambulates wo assistance No clubbing, cyanosis or edema   NEURO: Alert, oriented, grossly unremarkable, no focal deficits, speaking in full sentences, following commands  PSYCH: Cooperative, appropriate

## 2020-09-01 NOTE — ED PROVIDER NOTE - NSFOLLOWUPCLINICS_GEN_ALL_ED_FT
[facility] Concussion Program  Concussion Program  [address] [place] [address]   Phone: [phone]  Fax:   Follow Up Time:
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
## 2020-10-08 ENCOUNTER — EMERGENCY (EMERGENCY)
Facility: HOSPITAL | Age: 40
LOS: 0 days | Discharge: HOME | End: 2020-10-08
Attending: EMERGENCY MEDICINE | Admitting: EMERGENCY MEDICINE
Payer: MEDICAID

## 2020-10-08 VITALS
HEIGHT: 64 IN | SYSTOLIC BLOOD PRESSURE: 134 MMHG | DIASTOLIC BLOOD PRESSURE: 86 MMHG | HEART RATE: 75 BPM | OXYGEN SATURATION: 99 % | WEIGHT: 139.99 LBS | TEMPERATURE: 99 F | RESPIRATION RATE: 18 BRPM

## 2020-10-08 VITALS
DIASTOLIC BLOOD PRESSURE: 74 MMHG | HEART RATE: 64 BPM | SYSTOLIC BLOOD PRESSURE: 136 MMHG | OXYGEN SATURATION: 99 % | TEMPERATURE: 98 F | RESPIRATION RATE: 18 BRPM

## 2020-10-08 DIAGNOSIS — R10.31 RIGHT LOWER QUADRANT PAIN: ICD-10-CM

## 2020-10-08 DIAGNOSIS — F17.200 NICOTINE DEPENDENCE, UNSPECIFIED, UNCOMPLICATED: ICD-10-CM

## 2020-10-08 DIAGNOSIS — Z79.1 LONG TERM (CURRENT) USE OF NON-STEROIDAL ANTI-INFLAMMATORIES (NSAID): ICD-10-CM

## 2020-10-08 DIAGNOSIS — Z79.899 OTHER LONG TERM (CURRENT) DRUG THERAPY: ICD-10-CM

## 2020-10-08 DIAGNOSIS — J45.909 UNSPECIFIED ASTHMA, UNCOMPLICATED: ICD-10-CM

## 2020-10-08 DIAGNOSIS — R11.2 NAUSEA WITH VOMITING, UNSPECIFIED: ICD-10-CM

## 2020-10-08 DIAGNOSIS — Z79.82 LONG TERM (CURRENT) USE OF ASPIRIN: ICD-10-CM

## 2020-10-08 DIAGNOSIS — F41.9 ANXIETY DISORDER, UNSPECIFIED: ICD-10-CM

## 2020-10-08 LAB
ALBUMIN SERPL ELPH-MCNC: 4.6 G/DL — SIGNIFICANT CHANGE UP (ref 3.5–5.2)
ALP SERPL-CCNC: 85 U/L — SIGNIFICANT CHANGE UP (ref 30–115)
ALT FLD-CCNC: 19 U/L — SIGNIFICANT CHANGE UP (ref 0–41)
ANION GAP SERPL CALC-SCNC: 16 MMOL/L — HIGH (ref 7–14)
APPEARANCE UR: CLEAR — SIGNIFICANT CHANGE UP
AST SERPL-CCNC: 21 U/L — SIGNIFICANT CHANGE UP (ref 0–41)
BACTERIA # UR AUTO: NEGATIVE — SIGNIFICANT CHANGE UP
BASOPHILS # BLD AUTO: 0.03 K/UL — SIGNIFICANT CHANGE UP (ref 0–0.2)
BASOPHILS NFR BLD AUTO: 0.3 % — SIGNIFICANT CHANGE UP (ref 0–1)
BILIRUB SERPL-MCNC: 0.6 MG/DL — SIGNIFICANT CHANGE UP (ref 0.2–1.2)
BILIRUB UR-MCNC: NEGATIVE — SIGNIFICANT CHANGE UP
BUN SERPL-MCNC: 15 MG/DL — SIGNIFICANT CHANGE UP (ref 10–20)
CALCIUM SERPL-MCNC: 9.8 MG/DL — SIGNIFICANT CHANGE UP (ref 8.5–10.1)
CHLORIDE SERPL-SCNC: 98 MMOL/L — SIGNIFICANT CHANGE UP (ref 98–110)
CO2 SERPL-SCNC: 24 MMOL/L — SIGNIFICANT CHANGE UP (ref 17–32)
COLOR SPEC: YELLOW — SIGNIFICANT CHANGE UP
CREAT SERPL-MCNC: 0.7 MG/DL — SIGNIFICANT CHANGE UP (ref 0.7–1.5)
DIFF PNL FLD: NEGATIVE — SIGNIFICANT CHANGE UP
EOSINOPHIL # BLD AUTO: 0.02 K/UL — SIGNIFICANT CHANGE UP (ref 0–0.7)
EOSINOPHIL NFR BLD AUTO: 0.2 % — SIGNIFICANT CHANGE UP (ref 0–8)
EPI CELLS # UR: 8 /HPF — HIGH (ref 0–5)
GLUCOSE SERPL-MCNC: 102 MG/DL — HIGH (ref 70–99)
GLUCOSE UR QL: NEGATIVE — SIGNIFICANT CHANGE UP
HCG SERPL QL: NEGATIVE — SIGNIFICANT CHANGE UP
HCT VFR BLD CALC: 46.4 % — SIGNIFICANT CHANGE UP (ref 37–47)
HGB BLD-MCNC: 15.7 G/DL — SIGNIFICANT CHANGE UP (ref 12–16)
HYALINE CASTS # UR AUTO: 17 /LPF — HIGH (ref 0–7)
IMM GRANULOCYTES NFR BLD AUTO: 0.5 % — HIGH (ref 0.1–0.3)
KETONES UR-MCNC: ABNORMAL
LACTATE SERPL-SCNC: 1.8 MMOL/L — SIGNIFICANT CHANGE UP (ref 0.7–2)
LEUKOCYTE ESTERASE UR-ACNC: NEGATIVE — SIGNIFICANT CHANGE UP
LIDOCAIN IGE QN: 19 U/L — SIGNIFICANT CHANGE UP (ref 7–60)
LYMPHOCYTES # BLD AUTO: 2.5 K/UL — SIGNIFICANT CHANGE UP (ref 1.2–3.4)
LYMPHOCYTES # BLD AUTO: 24.8 % — SIGNIFICANT CHANGE UP (ref 20.5–51.1)
MCHC RBC-ENTMCNC: 31.6 PG — HIGH (ref 27–31)
MCHC RBC-ENTMCNC: 33.8 G/DL — SIGNIFICANT CHANGE UP (ref 32–37)
MCV RBC AUTO: 93.4 FL — SIGNIFICANT CHANGE UP (ref 81–99)
MONOCYTES # BLD AUTO: 0.66 K/UL — HIGH (ref 0.1–0.6)
MONOCYTES NFR BLD AUTO: 6.6 % — SIGNIFICANT CHANGE UP (ref 1.7–9.3)
NEUTROPHILS # BLD AUTO: 6.81 K/UL — HIGH (ref 1.4–6.5)
NEUTROPHILS NFR BLD AUTO: 67.6 % — SIGNIFICANT CHANGE UP (ref 42.2–75.2)
NITRITE UR-MCNC: NEGATIVE — SIGNIFICANT CHANGE UP
NRBC # BLD: 0 /100 WBCS — SIGNIFICANT CHANGE UP (ref 0–0)
PH UR: 6.5 — SIGNIFICANT CHANGE UP (ref 5–8)
PLATELET # BLD AUTO: 329 K/UL — SIGNIFICANT CHANGE UP (ref 130–400)
POTASSIUM SERPL-MCNC: 3.6 MMOL/L — SIGNIFICANT CHANGE UP (ref 3.5–5)
POTASSIUM SERPL-SCNC: 3.6 MMOL/L — SIGNIFICANT CHANGE UP (ref 3.5–5)
PROT SERPL-MCNC: 7.2 G/DL — SIGNIFICANT CHANGE UP (ref 6–8)
PROT UR-MCNC: ABNORMAL
RBC # BLD: 4.97 M/UL — SIGNIFICANT CHANGE UP (ref 4.2–5.4)
RBC # FLD: 12.3 % — SIGNIFICANT CHANGE UP (ref 11.5–14.5)
RBC CASTS # UR COMP ASSIST: 2 /HPF — SIGNIFICANT CHANGE UP (ref 0–4)
SODIUM SERPL-SCNC: 138 MMOL/L — SIGNIFICANT CHANGE UP (ref 135–146)
SP GR SPEC: >1.05 (ref 1.01–1.03)
UROBILINOGEN FLD QL: ABNORMAL
WBC # BLD: 10.07 K/UL — SIGNIFICANT CHANGE UP (ref 4.8–10.8)
WBC # FLD AUTO: 10.07 K/UL — SIGNIFICANT CHANGE UP (ref 4.8–10.8)
WBC UR QL: 4 /HPF — SIGNIFICANT CHANGE UP (ref 0–5)

## 2020-10-08 PROCEDURE — 74177 CT ABD & PELVIS W/CONTRAST: CPT | Mod: 26

## 2020-10-08 PROCEDURE — 99285 EMERGENCY DEPT VISIT HI MDM: CPT

## 2020-10-08 PROCEDURE — 76830 TRANSVAGINAL US NON-OB: CPT | Mod: 26

## 2020-10-08 RX ORDER — KETOROLAC TROMETHAMINE 30 MG/ML
15 SYRINGE (ML) INJECTION ONCE
Refills: 0 | Status: DISCONTINUED | OUTPATIENT
Start: 2020-10-08 | End: 2020-10-08

## 2020-10-08 RX ORDER — ONDANSETRON 8 MG/1
4 TABLET, FILM COATED ORAL ONCE
Refills: 0 | Status: COMPLETED | OUTPATIENT
Start: 2020-10-08 | End: 2020-10-08

## 2020-10-08 RX ORDER — METOCLOPRAMIDE HCL 10 MG
10 TABLET ORAL ONCE
Refills: 0 | Status: COMPLETED | OUTPATIENT
Start: 2020-10-08 | End: 2020-10-08

## 2020-10-08 RX ORDER — SODIUM CHLORIDE 9 MG/ML
1000 INJECTION INTRAMUSCULAR; INTRAVENOUS; SUBCUTANEOUS ONCE
Refills: 0 | Status: COMPLETED | OUTPATIENT
Start: 2020-10-08 | End: 2020-10-08

## 2020-10-08 RX ORDER — MORPHINE SULFATE 50 MG/1
4 CAPSULE, EXTENDED RELEASE ORAL ONCE
Refills: 0 | Status: DISCONTINUED | OUTPATIENT
Start: 2020-10-08 | End: 2020-10-08

## 2020-10-08 RX ORDER — IOHEXOL 300 MG/ML
30 INJECTION, SOLUTION INTRAVENOUS ONCE
Refills: 0 | Status: COMPLETED | OUTPATIENT
Start: 2020-10-08 | End: 2020-10-08

## 2020-10-08 RX ADMIN — IOHEXOL 30 MILLILITER(S): 300 INJECTION, SOLUTION INTRAVENOUS at 16:18

## 2020-10-08 RX ADMIN — ONDANSETRON 4 MILLIGRAM(S): 8 TABLET, FILM COATED ORAL at 17:43

## 2020-10-08 RX ADMIN — Medication 15 MILLIGRAM(S): at 20:13

## 2020-10-08 RX ADMIN — MORPHINE SULFATE 4 MILLIGRAM(S): 50 CAPSULE, EXTENDED RELEASE ORAL at 16:18

## 2020-10-08 RX ADMIN — SODIUM CHLORIDE 1000 MILLILITER(S): 9 INJECTION INTRAMUSCULAR; INTRAVENOUS; SUBCUTANEOUS at 16:19

## 2020-10-08 RX ADMIN — ONDANSETRON 4 MILLIGRAM(S): 8 TABLET, FILM COATED ORAL at 16:18

## 2020-10-08 RX ADMIN — SODIUM CHLORIDE 1000 MILLILITER(S): 9 INJECTION INTRAMUSCULAR; INTRAVENOUS; SUBCUTANEOUS at 20:13

## 2020-10-08 RX ADMIN — Medication 104 MILLIGRAM(S): at 20:13

## 2020-10-08 NOTE — ED PROVIDER NOTE - ATTENDING CONTRIBUTION TO CARE
39 y/o f w/ pm hx of asthma, cervical radiculopathy, anxiety, crohn's (not on any treatment), presents with RLQ abd pain x 2 days, sharp, constant, non-radiating, associated with multiple episodes of nausea and vomiting, nbnb. pt reports she went to hospital in [place] when symptoms came on as she was close by, was admitted to get Ct but left ama because pain became less severe and she had to get back home as her cat had surgery done, pain increased in severity so sister came over to stay with her cat and pt came to this ed. pain 10/10, no alleviating or precipitating factors. no vaginal bleeding/ discharge/ odor, no history of sti's. reports get depo shots so does not menstruate.   No fever, chills,  cp,  pleuritic cp, sob, palpitations, diaphoresis, cough, ha/lh/dizziness, numbness/tingling, neck pain/ stiffness, , diarrhea, constipation, melena/brbpr, urinary symptoms, trauma, weakness, edema, calf pain/swelling/erythema, sick contacts, recent travel or rash.    Vital Signs: I have reviewed the initial vital signs. Constitutional: WDWN in nad. Sitting on stretcher speaking full sentences. Integumentary: No rash. ENT: MMM NECK: Supple, non-tender, no meningeal signs. Cardiovascular: RRR, radial pulses 2/4 b/l. No JVD. Respiratory: BS present b/l, ctabl, no wheezing or crackles,  no accessory muscle use, no stridor. Gastrointestinal: BS present throughout all 4 quadrants, soft, nd, tenderness to RLQ, no rebound tenderness or guarding, no cvat.  (-) rovsing (-) obturator (-) psoas (-) stockton's Musculoskeletal: FROM, no edema, no calf pain/swelling/erythema. Neurologic: AAOx3, motor 5/5 and sensation intact throughout upper and lowe ext, CN II-XII intact, No facial droop or slurring of speech. No focal deficits.

## 2020-10-08 NOTE — ED PROVIDER NOTE - PROVIDER TOKENS
PROVIDER:[TOKEN:[[id number]],FOLLOWUP:[1-3 Days]],PROVIDER:[TOKEN:[[id number]],FOLLOWUP:[1-3 Days]]

## 2020-10-08 NOTE — ED PROVIDER NOTE - PLAN OF CARE
Plan: Labs, pregnancy test, ivf, pain control, urine, imaging, will continue to monitor and reassess.

## 2020-10-08 NOTE — ED PROVIDER NOTE - CLINICAL SUMMARY MEDICAL DECISION MAKING FREE TEXT BOX
pt feeling much better, tolerating po, comfortable going home, aware of all results, due to history discussed possible need for colonoscopy, pt reports she arzate snot have a gi physician, aware of signs and symptoms to return for, pt with no fever, white count 10.07. lactate 1.8, will give copy of result as discussed. will follow up as discussed.

## 2020-10-08 NOTE — ED PROVIDER NOTE - CARE PROVIDER_API CALL
ANDREW DEMARCO  Internal Medicine  Phone: [phone]  Follow Up Time: 1-3 Days    Amandeep Kim  GASTROENTEROLOGY  [address] [place] [address]  Phone: [phone]  Fax: [phone]  Follow Up Time: 1-3 Days

## 2020-10-08 NOTE — ED PROVIDER NOTE - NS ED ROS FT
Constitutional: See HPI.  Eyes: No visual changes, eye pain or discharge.   ENMT: No hearing changes, pain, discharge or infections. No neck pain or stiffness. No limited ROM  Cardiac: No SOB or edema. No chest pain with exertion.  Respiratory: No cough or respiratory distress.   GI: +nausea, +vomiting, + abdominal pain.  : No dysuria, frequency or burning. No Discharge  MS: No myalgia, muscle weakness, joint pain or back pain.  Neuro: No headache or weakness.   Skin: No skin rash.  Except as documented in the HPI, all other systems are negative.

## 2020-10-08 NOTE — ED PROVIDER NOTE - OBJECTIVE STATEMENT
40 y.o female w/ hx of asthma and chrons presents to the ED for evaluation of abd pain x 2 days.  Gradual onset, sharp, RLQ, worse w/ movement, moderate severity, no radiation of pain. Associated w/ nausea and vomiting. Seen in ED yesterday but left AMA to take care of her cat.  Denies diarrhea, constipation, vaginal d/c or bleeding, urinary sxs, fever, chills, back pain. not on immune modulators, never had chrons flare.

## 2020-10-08 NOTE — ED PROVIDER NOTE - CARE PLAN
Assessment and plan of treatment:	Plan: Labs, pregnancy test, ivf, pain control, urine, imaging, will continue to monitor and reassess.   Principal Discharge DX:	Abdominal pain  Assessment and plan of treatment:	Plan: Labs, pregnancy test, ivf, pain control, urine, imaging, will continue to monitor and reassess.

## 2020-10-08 NOTE — ED ADULT TRIAGE NOTE - CHIEF COMPLAINT QUOTE
Pt BIBA for increased right lower quadrant pain since Tuesday associated with nausea and vomiting. Pt denies fevers. EMS reports pt was left AMA from hospital [place] the city yesterday, reports increased pain today.

## 2020-10-08 NOTE — ED ADULT NURSE NOTE - NSIMPLEMENTINTERV_GEN_ALL_ED
Implemented All Universal Safety Interventions:  [place] to call system. Call bell, personal items and telephone within reach. Instruct patient to call for assistance. Room bathroom lighting operational. Non-slip footwear when patient is off stretcher. Physically safe environment: no spills, clutter or unnecessary equipment. Stretcher in lowest position, wheels locked, appropriate side rails in place.

## 2020-10-08 NOTE — ED PROVIDER NOTE - PATIENT PORTAL LINK FT
You can access the FollowMyHealth Patient Portal offered by [facility] by registering at the following website: [email or web address]. By joining [employer]s FollowMyHealth portal, you will also be able to view your health information using other applications (apps) compatible with our system.

## 2020-10-08 NOTE — ED PROVIDER NOTE - PROGRESS NOTE DETAILS
ED Attending REG Pope  Pt has been resting on stretcher. no vomiting in ed, abd re exam soft ntnd, no r/g, pending urine, no acute pathology on ct, pt aware, will continue to monitor and reassess.

## 2020-10-10 LAB
CULTURE RESULTS: SIGNIFICANT CHANGE UP
SPECIMEN SOURCE: SIGNIFICANT CHANGE UP

## 2021-01-06 ENCOUNTER — INPATIENT (INPATIENT)
Facility: HOSPITAL | Age: 41
LOS: 0 days | Discharge: HOME | End: 2021-01-07
Attending: INTERNAL MEDICINE | Admitting: INTERNAL MEDICINE
Payer: MEDICAID

## 2021-01-06 VITALS
DIASTOLIC BLOOD PRESSURE: 85 MMHG | OXYGEN SATURATION: 99 % | SYSTOLIC BLOOD PRESSURE: 179 MMHG | RESPIRATION RATE: 22 BRPM | HEIGHT: 64 IN | HEART RATE: 127 BPM

## 2021-01-06 LAB
ALBUMIN SERPL ELPH-MCNC: 4.4 G/DL — SIGNIFICANT CHANGE UP (ref 3.5–5.2)
ALP SERPL-CCNC: 117 U/L — HIGH (ref 30–115)
ALT FLD-CCNC: 33 U/L — SIGNIFICANT CHANGE UP (ref 0–41)
ANION GAP SERPL CALC-SCNC: 13 MMOL/L — SIGNIFICANT CHANGE UP (ref 7–14)
AST SERPL-CCNC: 21 U/L — SIGNIFICANT CHANGE UP (ref 0–41)
BASOPHILS # BLD AUTO: 0.09 K/UL — SIGNIFICANT CHANGE UP (ref 0–0.2)
BASOPHILS NFR BLD AUTO: 0.8 % — SIGNIFICANT CHANGE UP (ref 0–1)
BILIRUB SERPL-MCNC: 0.2 MG/DL — SIGNIFICANT CHANGE UP (ref 0.2–1.2)
BUN SERPL-MCNC: 8 MG/DL — LOW (ref 10–20)
CALCIUM SERPL-MCNC: 9.1 MG/DL — SIGNIFICANT CHANGE UP (ref 8.5–10.1)
CHLORIDE SERPL-SCNC: 105 MMOL/L — SIGNIFICANT CHANGE UP (ref 98–110)
CO2 SERPL-SCNC: 21 MMOL/L — SIGNIFICANT CHANGE UP (ref 17–32)
CREAT SERPL-MCNC: 0.6 MG/DL — LOW (ref 0.7–1.5)
EOSINOPHIL # BLD AUTO: 0.21 K/UL — SIGNIFICANT CHANGE UP (ref 0–0.7)
EOSINOPHIL NFR BLD AUTO: 1.9 % — SIGNIFICANT CHANGE UP (ref 0–8)
GLUCOSE SERPL-MCNC: 94 MG/DL — SIGNIFICANT CHANGE UP (ref 70–99)
HCG SERPL QL: NEGATIVE — SIGNIFICANT CHANGE UP
HCT VFR BLD CALC: 42.9 % — SIGNIFICANT CHANGE UP (ref 37–47)
HGB BLD-MCNC: 14.3 G/DL — SIGNIFICANT CHANGE UP (ref 12–16)
IMM GRANULOCYTES NFR BLD AUTO: 0.5 % — HIGH (ref 0.1–0.3)
LYMPHOCYTES # BLD AUTO: 3.76 K/UL — HIGH (ref 1.2–3.4)
LYMPHOCYTES # BLD AUTO: 34.1 % — SIGNIFICANT CHANGE UP (ref 20.5–51.1)
MAGNESIUM SERPL-MCNC: 2 MG/DL — SIGNIFICANT CHANGE UP (ref 1.8–2.4)
MCHC RBC-ENTMCNC: 31.8 PG — HIGH (ref 27–31)
MCHC RBC-ENTMCNC: 33.3 G/DL — SIGNIFICANT CHANGE UP (ref 32–37)
MCV RBC AUTO: 95.5 FL — SIGNIFICANT CHANGE UP (ref 81–99)
MONOCYTES # BLD AUTO: 0.86 K/UL — HIGH (ref 0.1–0.6)
MONOCYTES NFR BLD AUTO: 7.8 % — SIGNIFICANT CHANGE UP (ref 1.7–9.3)
NEUTROPHILS # BLD AUTO: 6.07 K/UL — SIGNIFICANT CHANGE UP (ref 1.4–6.5)
NEUTROPHILS NFR BLD AUTO: 54.9 % — SIGNIFICANT CHANGE UP (ref 42.2–75.2)
NRBC # BLD: 0 /100 WBCS — SIGNIFICANT CHANGE UP (ref 0–0)
NT-PROBNP SERPL-SCNC: 35 PG/ML — SIGNIFICANT CHANGE UP (ref 0–300)
PLATELET # BLD AUTO: 397 K/UL — SIGNIFICANT CHANGE UP (ref 130–400)
POTASSIUM SERPL-MCNC: 4.6 MMOL/L — SIGNIFICANT CHANGE UP (ref 3.5–5)
POTASSIUM SERPL-SCNC: 4.6 MMOL/L — SIGNIFICANT CHANGE UP (ref 3.5–5)
PROT SERPL-MCNC: 6.9 G/DL — SIGNIFICANT CHANGE UP (ref 6–8)
RBC # BLD: 4.49 M/UL — SIGNIFICANT CHANGE UP (ref 4.2–5.4)
RBC # FLD: 12.6 % — SIGNIFICANT CHANGE UP (ref 11.5–14.5)
SODIUM SERPL-SCNC: 139 MMOL/L — SIGNIFICANT CHANGE UP (ref 135–146)
TROPONIN T SERPL-MCNC: <0.01 NG/ML — SIGNIFICANT CHANGE UP
WBC # BLD: 11.04 K/UL — HIGH (ref 4.8–10.8)
WBC # FLD AUTO: 11.04 K/UL — HIGH (ref 4.8–10.8)

## 2021-01-06 PROCEDURE — 71045 X-RAY EXAM CHEST 1 VIEW: CPT | Mod: 26

## 2021-01-06 PROCEDURE — 99285 EMERGENCY DEPT VISIT HI MDM: CPT

## 2021-01-06 PROCEDURE — 71275 CT ANGIOGRAPHY CHEST: CPT | Mod: 26

## 2021-01-06 PROCEDURE — 74174 CTA ABD&PLVS W/CONTRAST: CPT | Mod: 26

## 2021-01-06 RX ORDER — MORPHINE SULFATE 50 MG/1
4 CAPSULE, EXTENDED RELEASE ORAL ONCE
Refills: 0 | Status: DISCONTINUED | OUTPATIENT
Start: 2021-01-06 | End: 2021-01-06

## 2021-01-06 RX ORDER — MORPHINE SULFATE 50 MG/1
2 CAPSULE, EXTENDED RELEASE ORAL ONCE
Refills: 0 | Status: DISCONTINUED | OUTPATIENT
Start: 2021-01-06 | End: 2021-01-06

## 2021-01-06 RX ORDER — ASPIRIN/CALCIUM CARB/MAGNESIUM 324 MG
325 TABLET ORAL ONCE
Refills: 0 | Status: COMPLETED | OUTPATIENT
Start: 2021-01-06 | End: 2021-01-06

## 2021-01-06 RX ORDER — SODIUM CHLORIDE 9 MG/ML
1000 INJECTION INTRAMUSCULAR; INTRAVENOUS; SUBCUTANEOUS ONCE
Refills: 0 | Status: COMPLETED | OUTPATIENT
Start: 2021-01-06 | End: 2021-01-06

## 2021-01-06 RX ORDER — KETOROLAC TROMETHAMINE 30 MG/ML
15 SYRINGE (ML) INJECTION ONCE
Refills: 0 | Status: DISCONTINUED | OUTPATIENT
Start: 2021-01-06 | End: 2021-01-06

## 2021-01-06 RX ADMIN — MORPHINE SULFATE 4 MILLIGRAM(S): 50 CAPSULE, EXTENDED RELEASE ORAL at 20:15

## 2021-01-06 RX ADMIN — SODIUM CHLORIDE 1000 MILLILITER(S): 9 INJECTION INTRAMUSCULAR; INTRAVENOUS; SUBCUTANEOUS at 20:11

## 2021-01-06 RX ADMIN — Medication 325 MILLIGRAM(S): at 19:08

## 2021-01-06 RX ADMIN — Medication 15 MILLIGRAM(S): at 22:34

## 2021-01-06 RX ADMIN — SODIUM CHLORIDE 1000 MILLILITER(S): 9 INJECTION INTRAMUSCULAR; INTRAVENOUS; SUBCUTANEOUS at 18:55

## 2021-01-06 RX ADMIN — MORPHINE SULFATE 2 MILLIGRAM(S): 50 CAPSULE, EXTENDED RELEASE ORAL at 23:24

## 2021-01-06 NOTE — ED ADULT NURSE NOTE - OBJECTIVE STATEMENT
Pt. presents to ED with complaints of left sided chest pain, sharp in nature, constant, and radiating to left arm.

## 2021-01-06 NOTE — ED PROVIDER NOTE - PHYSICAL EXAMINATION
CONST: mild distress 2/2 pain  EYES: PERRL, EOMI, Sclera and conjunctiva clear.  NECK: Non-tender, no meningeal signs, supple,   CARD: Normal S1 S2; Normal rate and rhythm  RESP: tachycardic, regular.   GI: Soft, non-tender, non-distended.  MS: Normal ROM in all extremities. No edema of lower extremities, no calf pain, radial/pedal pulses 2+ bilaterally  SKIN: Warm, dry, no acute rashes. Good turgor  NEURO: A&Ox3, No focal deficits. Strength 5/5 with no sensory deficits. Steady gait

## 2021-01-06 NOTE — ED PROVIDER NOTE - ATTENDING CONTRIBUTION TO CARE
39 yo F pmh of asthma, chrons disease, active smoker presents with severe left chest pain. States that for the alst week she has been having sharp left sided chest pain that has been constant. 1 hour prior to arrival states that the pain became acutely worse and now feels like a twisting sensation. no shortness of breath. no similar episodes in the past. Did have episode of chest pain a few months ago that felt different. At that time had CCTA with score of 1. EKG done on arrival. no STEMI seen. Cardiology called bedside due to acute pain. Dissection study ordered.     CONSTITUTIONAL: Well-developed; well-nourished; in no acute distress.   SKIN: warm, dry  HEAD: Normocephalic; atraumatic.  EYES: PERRL, EOMI, no conjunctival erythema  ENT: No nasal discharge; airway clear.  NECK: Supple; non tender.  CARD: S1, S2 normal;  Regular rate and rhythm.   RESP: No wheezes, rales or rhonchi.  ABD: soft non tender, non distended, no rebound or guarding  EXT: Normal ROM.  5/5 strength in all 4 extremities   LYMPH: No acute cervical adenopathy.  NEURO: Alert, oriented, grossly unremarkable. neurovascularly intact. equal pulses bilaterally.   PSYCH: Cooperative, appropriate.

## 2021-01-06 NOTE — ED PROVIDER NOTE - NS ED ROS FT
Constitutional: See HPI.  Eyes: No visual changes, eye pain or discharge.   ENMT: No hearing changes, pain, discharge or infections. No neck pain or stiffness. No limited ROM  Cardiac: + chest pain, no edema.   Respiratory: No cough or respiratory distress. No hemoptysis  GI: No nausea, vomiting, diarrhea or abdominal pain.  : No dysuria, frequency or burning. No Discharge  MS: No myalgia, muscle weakness, joint pain or back pain.  Neuro: No headache or weakness.   Skin: No skin rash.  Except as documented in the HPI, all other systems are negative.

## 2021-01-06 NOTE — ED PROVIDER NOTE - OBJECTIVE STATEMENT
40 y.o female w/ hx of  asthma and chrons presents to the ED for evaluation of chests pain x 1 hr/  ACute onset, L sided, tearing, moderate severity, radiates to back. No headache, dyspnea, edema of lower extremities, calf pain, fever, chills, abd pain, urinary sxs.  No further complaints. 8/8/19 ccta cad rads 1. + fhx.

## 2021-01-06 NOTE — ED PROVIDER NOTE - CLINICAL SUMMARY MEDICAL DECISION MAKING FREE TEXT BOX
Patient presents with sudden onset chest pain 1 hour ago. Presented clutching her chest. no STEMI on ekg. Cardiology called and bedside. No mediastinal widening on cxr. CT dissection done that was negative. Pain improved with some medications. Hx of ccta with score of 1 in august. Admitted for further management.

## 2021-01-06 NOTE — ED ADULT NURSE NOTE - NSIMPLEMENTINTERV_GEN_ALL_ED
Implemented All Fall with Harm Risk Interventions:  [place] to call system. Call bell, personal items and telephone within reach. Instruct patient to call for assistance. Room bathroom lighting operational. Non-slip footwear when patient is off stretcher. Physically safe environment: no spills, clutter or unnecessary equipment. Stretcher in lowest position, wheels locked, appropriate side rails in place. Provide visual cue, wrist band, yellow gown, etc. Monitor gait and stability. Monitor for mental status changes and reorient to person, place, and time. Review medications for side effects contributing to fall risk. Reinforce activity limits and safety measures with patient and family. Provide visual clues: red socks.

## 2021-01-07 ENCOUNTER — TRANSCRIPTION ENCOUNTER (OUTPATIENT)
Age: 41
End: 2021-01-07

## 2021-01-07 VITALS
DIASTOLIC BLOOD PRESSURE: 60 MMHG | SYSTOLIC BLOOD PRESSURE: 118 MMHG | OXYGEN SATURATION: 97 % | TEMPERATURE: 98 F | RESPIRATION RATE: 17 BRPM | HEART RATE: 75 BPM

## 2021-01-07 LAB
ANION GAP SERPL CALC-SCNC: 11 MMOL/L — SIGNIFICANT CHANGE UP (ref 7–14)
BASOPHILS # BLD AUTO: 0.06 K/UL — SIGNIFICANT CHANGE UP (ref 0–0.2)
BASOPHILS NFR BLD AUTO: 0.6 % — SIGNIFICANT CHANGE UP (ref 0–1)
BUN SERPL-MCNC: 7 MG/DL — LOW (ref 10–20)
CALCIUM SERPL-MCNC: 9.1 MG/DL — SIGNIFICANT CHANGE UP (ref 8.5–10.1)
CHLORIDE SERPL-SCNC: 107 MMOL/L — SIGNIFICANT CHANGE UP (ref 98–110)
CO2 SERPL-SCNC: 23 MMOL/L — SIGNIFICANT CHANGE UP (ref 17–32)
CREAT SERPL-MCNC: 0.6 MG/DL — LOW (ref 0.7–1.5)
EOSINOPHIL # BLD AUTO: 0.43 K/UL — SIGNIFICANT CHANGE UP (ref 0–0.7)
EOSINOPHIL NFR BLD AUTO: 4.6 % — SIGNIFICANT CHANGE UP (ref 0–8)
GLUCOSE SERPL-MCNC: 85 MG/DL — SIGNIFICANT CHANGE UP (ref 70–99)
HCT VFR BLD CALC: 37.9 % — SIGNIFICANT CHANGE UP (ref 37–47)
HGB BLD-MCNC: 12.5 G/DL — SIGNIFICANT CHANGE UP (ref 12–16)
IMM GRANULOCYTES NFR BLD AUTO: 0.2 % — SIGNIFICANT CHANGE UP (ref 0.1–0.3)
LYMPHOCYTES # BLD AUTO: 4.47 K/UL — HIGH (ref 1.2–3.4)
LYMPHOCYTES # BLD AUTO: 47.5 % — SIGNIFICANT CHANGE UP (ref 20.5–51.1)
MAGNESIUM SERPL-MCNC: 2.1 MG/DL — SIGNIFICANT CHANGE UP (ref 1.8–2.4)
MCHC RBC-ENTMCNC: 31.7 PG — HIGH (ref 27–31)
MCHC RBC-ENTMCNC: 33 G/DL — SIGNIFICANT CHANGE UP (ref 32–37)
MCV RBC AUTO: 96.2 FL — SIGNIFICANT CHANGE UP (ref 81–99)
MONOCYTES # BLD AUTO: 0.65 K/UL — HIGH (ref 0.1–0.6)
MONOCYTES NFR BLD AUTO: 6.9 % — SIGNIFICANT CHANGE UP (ref 1.7–9.3)
NEUTROPHILS # BLD AUTO: 3.79 K/UL — SIGNIFICANT CHANGE UP (ref 1.4–6.5)
NEUTROPHILS NFR BLD AUTO: 40.2 % — LOW (ref 42.2–75.2)
NRBC # BLD: 0 /100 WBCS — SIGNIFICANT CHANGE UP (ref 0–0)
PLATELET # BLD AUTO: 319 K/UL — SIGNIFICANT CHANGE UP (ref 130–400)
POTASSIUM SERPL-MCNC: 3.6 MMOL/L — SIGNIFICANT CHANGE UP (ref 3.5–5)
POTASSIUM SERPL-SCNC: 3.6 MMOL/L — SIGNIFICANT CHANGE UP (ref 3.5–5)
RAPID RVP RESULT: SIGNIFICANT CHANGE UP
RBC # BLD: 3.94 M/UL — LOW (ref 4.2–5.4)
RBC # FLD: 12.7 % — SIGNIFICANT CHANGE UP (ref 11.5–14.5)
SARS-COV-2 RNA SPEC QL NAA+PROBE: SIGNIFICANT CHANGE UP
SODIUM SERPL-SCNC: 141 MMOL/L — SIGNIFICANT CHANGE UP (ref 135–146)
TROPONIN T SERPL-MCNC: <0.01 NG/ML — SIGNIFICANT CHANGE UP
WBC # BLD: 9.42 K/UL — SIGNIFICANT CHANGE UP (ref 4.8–10.8)
WBC # FLD AUTO: 9.42 K/UL — SIGNIFICANT CHANGE UP (ref 4.8–10.8)

## 2021-01-07 PROCEDURE — 99222 1ST HOSP IP/OBS MODERATE 55: CPT

## 2021-01-07 PROCEDURE — 93010 ELECTROCARDIOGRAM REPORT: CPT

## 2021-01-07 PROCEDURE — 99236 HOSP IP/OBS SAME DATE HI 85: CPT

## 2021-01-07 RX ORDER — ATORVASTATIN CALCIUM 80 MG/1
20 TABLET, FILM COATED ORAL AT BEDTIME
Refills: 0 | Status: DISCONTINUED | OUTPATIENT
Start: 2021-01-07 | End: 2021-01-07

## 2021-01-07 RX ORDER — CHLORHEXIDINE GLUCONATE 213 G/1000ML
1 SOLUTION TOPICAL
Refills: 0 | Status: DISCONTINUED | OUTPATIENT
Start: 2021-01-07 | End: 2021-01-07

## 2021-01-07 RX ORDER — GABAPENTIN 400 MG/1
800 CAPSULE ORAL THREE TIMES A DAY
Refills: 0 | Status: DISCONTINUED | OUTPATIENT
Start: 2021-01-07 | End: 2021-01-07

## 2021-01-07 RX ORDER — GABAPENTIN 400 MG/1
1 CAPSULE ORAL
Qty: 0 | Refills: 0 | DISCHARGE

## 2021-01-07 RX ORDER — ALPRAZOLAM 0.25 MG
1 TABLET ORAL THREE TIMES A DAY
Refills: 0 | Status: DISCONTINUED | OUTPATIENT
Start: 2021-01-07 | End: 2021-01-07

## 2021-01-07 RX ORDER — ENOXAPARIN SODIUM 100 MG/ML
40 INJECTION SUBCUTANEOUS DAILY
Refills: 0 | Status: DISCONTINUED | OUTPATIENT
Start: 2021-01-07 | End: 2021-01-07

## 2021-01-07 RX ORDER — MELOXICAM 15 MG/1
1 TABLET ORAL
Qty: 0 | Refills: 0 | DISCHARGE

## 2021-01-07 RX ORDER — MORPHINE SULFATE 50 MG/1
4 CAPSULE, EXTENDED RELEASE ORAL ONCE
Refills: 0 | Status: DISCONTINUED | OUTPATIENT
Start: 2021-01-07 | End: 2021-01-07

## 2021-01-07 RX ORDER — ALPRAZOLAM 0.25 MG
0 TABLET ORAL
Qty: 75 | Refills: 0 | DISCHARGE

## 2021-01-07 RX ORDER — OXYCODONE AND ACETAMINOPHEN 5; 325 MG/1; MG/1
2 TABLET ORAL EVERY 6 HOURS
Refills: 0 | Status: DISCONTINUED | OUTPATIENT
Start: 2021-01-07 | End: 2021-01-07

## 2021-01-07 RX ORDER — ALBUTEROL 90 UG/1
2 AEROSOL, METERED ORAL EVERY 6 HOURS
Refills: 0 | Status: DISCONTINUED | OUTPATIENT
Start: 2021-01-07 | End: 2021-01-07

## 2021-01-07 RX ORDER — FEXOFENADINE HCL 30 MG
0 TABLET ORAL
Qty: 30 | Refills: 0 | DISCHARGE

## 2021-01-07 RX ORDER — DULOXETINE HYDROCHLORIDE 30 MG/1
0 CAPSULE, DELAYED RELEASE ORAL
Qty: 30 | Refills: 0 | DISCHARGE

## 2021-01-07 RX ORDER — DULOXETINE HYDROCHLORIDE 30 MG/1
60 CAPSULE, DELAYED RELEASE ORAL DAILY
Refills: 0 | Status: DISCONTINUED | OUTPATIENT
Start: 2021-01-07 | End: 2021-01-07

## 2021-01-07 RX ORDER — MELOXICAM 15 MG/1
0 TABLET ORAL
Qty: 30 | Refills: 0 | DISCHARGE

## 2021-01-07 RX ORDER — ALPRAZOLAM 0.25 MG
1 TABLET ORAL
Qty: 0 | Refills: 0 | DISCHARGE

## 2021-01-07 RX ORDER — CELECOXIB 200 MG/1
200 CAPSULE ORAL DAILY
Refills: 0 | Status: DISCONTINUED | OUTPATIENT
Start: 2021-01-07 | End: 2021-01-07

## 2021-01-07 RX ORDER — BUDESONIDE AND FORMOTEROL FUMARATE DIHYDRATE 160; 4.5 UG/1; UG/1
2 AEROSOL RESPIRATORY (INHALATION)
Refills: 0 | Status: DISCONTINUED | OUTPATIENT
Start: 2021-01-07 | End: 2021-01-07

## 2021-01-07 RX ADMIN — OXYCODONE AND ACETAMINOPHEN 2 TABLET(S): 5; 325 TABLET ORAL at 04:52

## 2021-01-07 RX ADMIN — GABAPENTIN 800 MILLIGRAM(S): 400 CAPSULE ORAL at 13:26

## 2021-01-07 RX ADMIN — GABAPENTIN 800 MILLIGRAM(S): 400 CAPSULE ORAL at 04:53

## 2021-01-07 RX ADMIN — MORPHINE SULFATE 4 MILLIGRAM(S): 50 CAPSULE, EXTENDED RELEASE ORAL at 01:15

## 2021-01-07 RX ADMIN — BUDESONIDE AND FORMOTEROL FUMARATE DIHYDRATE 2 PUFF(S): 160; 4.5 AEROSOL RESPIRATORY (INHALATION) at 11:12

## 2021-01-07 RX ADMIN — Medication 1 MILLIGRAM(S): at 04:54

## 2021-01-07 RX ADMIN — ENOXAPARIN SODIUM 40 MILLIGRAM(S): 100 INJECTION SUBCUTANEOUS at 11:12

## 2021-01-07 RX ADMIN — CELECOXIB 200 MILLIGRAM(S): 200 CAPSULE ORAL at 11:11

## 2021-01-07 RX ADMIN — DULOXETINE HYDROCHLORIDE 60 MILLIGRAM(S): 30 CAPSULE, DELAYED RELEASE ORAL at 11:11

## 2021-01-07 NOTE — DISCHARGE NOTE PROVIDER - HOSPITAL COURSE
.Pt is a 39 y/o female PMHx of asthma, anxiety, HLD, Crohn's disease, R hand nerve damage s/p epidural, arthritis, and herniated discs presents to the ED for evaluation of chests pain for past hour prior to arrival. Pt reports that she does occasionally get some chest discomfort and had a CCTA 8/2019 with CAD-RADS 1. Pt reports that she has had a mild cough past few days and hasn't done any lifting or strenuous activity. Pain is described as acute in onset, started as left sided sharp pain and then progressed to "twisting" sensation radiating to back. Pain is so severe that it causes her to catch her breath. At time of onset pt was not doing any physical activity. Otherwise denies headache, ongoing dyspnea, edema of lower extremities, calf pain, fever, chills, or abdominal pain.    In the ED: tachycardic and hypertensive on triage. Pt appears very anxious. No STEMI on ekg. trops neg x1. BNP 35. Cardiology saw pt in ED, doubt cardiac in nature. No mediastinal widening on cxr. CT angio chest/abd/pelvis neg for aneurysm or dissection that was negative. COVID neg. Cardio assessed patient ....   .Pt is a 41 y/o female PMHx of asthma, anxiety, HLD, Crohn's disease, R hand nerve damage s/p epidural, arthritis, and herniated discs presents to the ED for evaluation of chests pain for past hour prior to arrival. Pt reports that she does occasionally get some chest discomfort and had a CCTA 8/2019 with CAD-RADS 1. Pt reports that she has had a mild cough past few days and hasn't done any lifting or strenuous activity. Pain is described as acute in onset, started as left sided sharp pain and then progressed to "twisting" sensation radiating to back. Pain is so severe that it causes her to catch her breath. At time of onset pt was not doing any physical activity. Otherwise denies headache, ongoing dyspnea, edema of lower extremities, calf pain, fever, chills, or abdominal pain.    In the ED: tachycardic and hypertensive on triage. Pt appears very anxious. No STEMI on ekg. trops neg x1. BNP 35. Cardiology saw pt in ED, doubt cardiac in nature. No mediastinal widening on cxr. CT angio chest/abd/pelvis neg for aneurysm or dissection that was negative. COVID neg. Cardio assessed patient and recommended outpatient stress test. Patient was clinically stable and discharged to home.

## 2021-01-07 NOTE — H&P ADULT - NSHPPHYSICALEXAM_GEN_ALL_CORE
GENERAL: anxious appearing, well-developed, AAOx3  HEENT:  Atraumatic, Normocephalic. EOMI, conjunctiva and sclera clear, No JVD  PULMONARY: Clear to auscultation bilaterally; No wheeze  CARDIOVASCULAR: Regular rate and rhythm; No murmurs, rubs, or gallops  GASTROINTESTINAL: Soft, Nontender, Nondistended; Bowel sounds present  MUSCULOSKELETAL:  no cyanosis or edema. chest not tender to palpation  NEUROLOGY: non-focal  SKIN: No rashes or lesions

## 2021-01-07 NOTE — DISCHARGE NOTE NURSING/CASE MANAGEMENT/SOCIAL WORK - PATIENT PORTAL LINK FT
You can access the FollowMyHealth Patient Portal offered by [facility] by registering at the following website: [email or web address]. By joining [employer]’s FollowMyHealth portal, you will also be able to view your health information using other applications (apps) compatible with our system.

## 2021-01-07 NOTE — DISCHARGE NOTE PROVIDER - CARE PROVIDER_API CALL
Willy Hester)  Family Medicine  [address] [place] [address]  Phone: [phone]  Fax: [phone]  Follow Up Time: 2 weeks   Willy Hester)  Family Medicine  [address] [place] [address]  Phone: [phone]  Fax: [phone]  Follow Up Time: 2 weeks    Aime Kaiser)  Critical Care Medicine; Pulmonary Disease; Sleep Medicine  [address] [place] [address]  Phone: [phone]  Fax: [phone]  Follow Up Time: 2 weeks

## 2021-01-07 NOTE — H&P ADULT - ASSESSMENT
Pt is a 39 y/o female PMHx of asthma, anxiety, HLD, Crohn's disease, R hand neuropathy s/p epidural, arthritis, and herniated discs presents to the ED for evaluation of chests pain for past hour prior to arrival.    # Acute chest pain- anxiety attack vs MSK vs cardio less likely  - pt reports occasional chest discomfort that usually passes, this episode was worse  - not tender to palpation  - seen by cardio in ED, f/u official consult  - EKG non ischemic, f/u AM repeat  - trops neg x1, f/u AM repeat  - CTA chest/abdomen/pelvis neg  - admitted to tele    # Asthma- controlled  - not in exacerbation  - continue home symbicort and albuterol prn    # R arm/hand neuropathy  # Chronic back pain and arthritis  - continue home gabapentin, duloxetine, pain regimen    # Crohn's Disease- in remission  - stable off meds      DVT ppx: lovenox  GI ppx: not indicated  Diet: DASH  Code Status: Full Code

## 2021-01-07 NOTE — DISCHARGE NOTE PROVIDER - PROVIDER TOKENS
PROVIDER:[TOKEN:[[id number]],FOLLOWUP:[2 weeks]] PROVIDER:[TOKEN:[[id number]],FOLLOWUP:[2 weeks]],PROVIDER:[TOKEN:[[id number]],FOLLOWUP:[2 weeks]]

## 2021-01-07 NOTE — H&P ADULT - ATTENDING COMMENTS
Pt is a 39 y/o female PMH of asthma, anxiety, Crohn's disease came  to the ED for chests pain started one hour ago left sided, sharp radiate to the back, severe, with SOB.  No fever, she has mild cough.   In the ED: tachycardic and hypertensive on triage. Pt appears very anxious. EKG was normal, no ischemic changes, trops neg x1. BNP 35. Cardiology saw pt in ED, doubt cardiac in nature. No mediastinal widening on cxr. CT angio chest/abd/pelvis neg for aneurysm or dissection that was negative. COVID neg.    PHYSICAL EXAM:  GENERAL: NAD, well-developed.  HEAD:  Atraumatic, Normocephalic.  EYES: EOMI, PERRLA, conjunctiva and sclera clear.  NECK: Supple, No JVD.  CHEST/LUNG: Clear to auscultation bilaterally; No wheeze.  HEART: Regular rate and rhythm; S1 S2.   ABDOMEN: Soft, Nontender, Nondistended; Bowel sounds present.  EXTREMITIES:  2+ Peripheral Pulses, No clubbing, cyanosis, or edema.  PSYCH: AAOx3.  NEUROLOGY: non-focal.  SKIN: No rashes or lesions.    A/P:   Chest pain: atypical.   ACS ruled out.   Troponin x2 negative.   CT coronary 2019 was normal.   Cardiology recommended outpatient stress test.

## 2021-01-07 NOTE — DISCHARGE NOTE PROVIDER - NSDCCPCAREPLAN_GEN_ALL_CORE_FT
PRINCIPAL DISCHARGE DIAGNOSIS  Diagnosis: Chest pain  Assessment and Plan of Treatment: Chest Pain  Chest pain can be caused by many different conditions which may or may not be dangerous. Causes include heartburn, lung infections, heart attack, blood clot in lungs, skin infections, strain or damage to muscle, cartilage, or bones, etc. In addition to a history and physical examination, an electrocardiogram (ECG) or other lab tests may have been performed to determine the cause of your chest pain. Follow up with your primary care provider or with a cardiologist as instructed.   SEEK IMMEDIATE MEDICAL CARE IF YOU HAVE ANY OF THE FOLLOWING SYMPTOMS: worsening chest pain, coughing up blood, unexplained back/neck/jaw pain, severe abdominal pain, dizziness or lightheadedness, fainting, shortness of breath, sweaty or clammy skin, vomiting, or racing heart beat. These symptoms may represent a serious problem that is an emergency. Do not wait to see if the symptoms will go away. Get medical help right away. Call 911 and do not drive yourself to the hospital.      SECONDARY DISCHARGE DIAGNOSES  Diagnosis: Tobacco use disorder  Assessment and Plan of Treatment: WHAT YOU NEED TO KNOW:  You will improve your health and the health of others around you if you stop smoking. Your risk for heart and lung disease, cancer, stroke, heart attack, and vision problems will also decrease. You can benefit from quitting no matter how long you have smoked.  DISCHARGE INSTRUCTIONS:  Prepare to stop smoking:  Nicotine is a highly addictive drug found in cigarettes. Withdrawal symptoms can happen when you stop smoking and make it hard to quit. These include anxiety, depression, irritability, trouble sleeping, and increased appetite. You increase your chances of success if you prepare to quit.  Set a quit date. Pick a date that is within the next 2 weeks. Do not pick a day that you think may be stressful or busy. Write down the day or [place] it on your calender.  Tell friends and family that you plan to quit. Explain that you may have withdrawal symptoms when you try to quit. Ask them to support you. They may be able to encourage you and help reduce your stress to make it easier for you to quit.  Make a list of your reasons for quitting. Put the list somewhere you will see it every day, such as your refrigerator. You can look at the list when you have a craving.  Remove all tobacco and nicotine products from your home, car, and workplace. Also, remove anything else that will tempt you to smoke, such as lighters, matches, or ashtrays. Clean your car, home, and places at work that smell like smoke. The smell of smoke can trigger a craving.  Identify triggers that make you want to smoke. This may include activities, feelings, or people. Also write down 1 way you can deal with each of your triggers. For example, if you want to smoke as soon as you wake up, plan another activity during this time, such as exercise.  Make a plan for how you will quit. Learn about the tools that can help you quit, such as medicine, counseling, or nicotine replacement therapy. Choose at least 2 options to help you quit.  Tools to help you stop smoking:  Nicotine replacement therap    Diagnosis: Asthma  Assessment and Plan of Treatment:

## 2021-01-07 NOTE — DISCHARGE NOTE PROVIDER - NSDCMRMEDTOKEN_GEN_ALL_CORE_FT
albuterol 90 mcg/inh inhalation aerosol: 2 puff(s) inhaled every 6 hours, As needed, Shortness of Breath and/or Wheezing  ALPRAZolam 1 mg oral tablet: 1 tab(s) orally 3 times a day, As Needed  DULOXETINE HCL DR 60 MG CAP:   gabapentin 800 mg oral tablet: 1 tab(s) orally 3 times a day  Lipitor 20 mg oral tablet: 1 tab(s) orally once a day   MELOXICAM 15 MG TABLET: 1 tab(s) orally once a day  OXYCODONE-ACETAMINOPHEN : 1 tab(s) orally every 6 hours, As Needed  Symbicort 160 mcg-4.5 mcg/inh inhalation aerosol: 2 puff(s) inhaled once a day

## 2021-01-07 NOTE — DISCHARGE NOTE PROVIDER - NSDCFUADDINST_GEN_ALL_CORE_FT
Call Dr. Kaiser to follow up with pulmonary function tests.    If desired, contact [facility] for psychiatric care.     [facility] [address] [place] [address]   [phone]

## 2021-01-07 NOTE — H&P ADULT - NSICDXPASTMEDICALHX_GEN_ALL_CORE_FT
PAST MEDICAL HISTORY:  Anxiety     Asthma     Crohn's disease without complication, unspecified gastrointestinal tract location     Eczema     Lumbar herniated disc     Neuropathy right arm/hand

## 2021-01-07 NOTE — H&P ADULT - NSICDXFAMILYHX_GEN_ALL_CORE_FT
FAMILY HISTORY:  Family history of thyroid cancer, Dad  FH: CAD (coronary artery disease), Mom  FH: skin cancer, Dad

## 2021-01-07 NOTE — H&P ADULT - NSHPREVIEWOFSYSTEMS_GEN_ALL_CORE
CONSTITUTIONAL: No weakness, fevers or chills, no weight loss   EYES/ENT: No visual changes;  No vertigo or throat pain   NECK: No pain or stiffness  RESPIRATORY: + cough. No wheezing, hemoptysis; No shortness of breath  CARDIOVASCULAR: + chest pain. No palpitations  GASTROINTESTINAL: No abdominal or epigastric pain. No nausea, vomiting, or hematemesis; No diarrhea or constipation. No melena or hematochezia.  GENITOURINARY: No dysuria, frequency or hematuria  NEUROLOGICAL: No numbness or weakness  All other review of systems is negative unless indicated above.

## 2021-01-07 NOTE — H&P ADULT - NSHPLABSRESULTS_GEN_ALL_CORE
Complete Blood Count + Automated Diff (01.06.21 @ 19:03)   WBC Count: 11.04 K/uL   RBC Count: 4.49 M/uL   Hemoglobin: 14.3 g/dL   Hematocrit: 42.9 %   Mean Cell Volume: 95.5 fL   Mean Cell Hemoglobin: 31.8 pg   Mean Cell Hemoglobin Conc: 33.3 g/dL   Red Cell Distrib Width: 12.6 %   Platelet Count - Automated: 397 K/uL   Auto Neutrophil #: 6.07 K/uL   Auto Lymphocyte #: 3.76 K/uL   Auto Monocyte #: 0.86 K/uL   Auto Eosinophil #: 0.21 K/uL   Auto Basophil #: 0.09 K/uL   Auto Neutrophil %: 54.9: Differential percentages must be correlated with absolute numbers for   clinical significance. %   Auto Lymphocyte %: 34.1 %   Auto Monocyte %: 7.8 %   Auto Eosinophil %: 1.9 %   Auto Basophil %: 0.8 %   Auto Immature Granulocyte %: 0.5 %   Nucleated RBC: 0 /100 WBCs     Comprehensive Metabolic Panel (01.06.21 @ 19:03)   Sodium, Serum: 139 mmol/L   Potassium, Serum: 4.6 mmol/L   Chloride, Serum: 105 mmol/L   Carbon Dioxide, Serum: 21 mmol/L   Anion Gap, Serum: 13 mmol/L   Blood Urea Nitrogen, Serum: 8 mg/dL   Creatinine, Serum: 0.6 mg/dL   Glucose, Serum: 94 mg/dL   Calcium, Total Serum: 9.1 mg/dL   Protein Total, Serum: 6.9 g/dL   Albumin, Serum: 4.4 g/dL   Bilirubin Total, Serum: 0.2 mg/dL   Alkaline Phosphatase, Serum: 117 U/L   Aspartate Aminotransferase (AST/SGOT): 21 U/L   Alanine Aminotransferase (ALT/SGPT): 33 U/L   eGFR if Non : 114: Interpretative comment   The units for eGFR are mL/min/1.73M2 (normalized body surface area). The   eGFR is calculated from a serum creatinine using the CKD-EPI equation.   Other variables required for calculation are race, age and sex. Among   patients with chronic kidney disease (CKD), the eGFR is useful in   determining the stage of disease according to KDOQI CKD classification.   All eGFR results are reported numerically with the following   interpretation.   GFR With Without   (ml/min/1.73 m2) Kidney Damage Kidney Damage   >= 90 Stage 1 Normal   60-89 Stage 2 Decreased GFR   30-59 Stage 3 Stage 3   15-29 Stage 4 Stage 4   < 15 Stage 5 Stage 5   Each stage of CKD assumes that the associated GFR level has been in   effect for at least 3 months. Determination of stages one and two (with   eGFR > 59 ml/min/m2) requires estimation of kidney damage for at least 3   months as defined by structural or functional abnormalities.   Limitations: All estimates of GFR will be less accurate for patients at   extremes of muscle mass (including but not limited to frail elderly,   critically ill, or cancer patients), those with unusual diets, and those   with conditions associated with reduced secretion or extrarenal   elimination of creatinine. The eGFR equation is not recommended for use   in patients with unstable creatinine levels. mL/min/1.73M2   eGFR if African American: 132 mL/min/1.73M2       < from: CT Angio Chest, Abdomen, Pelvis w/ IV Cont (01.06.21 @ 21:49) >    IMPRESSION: No evidence of aortic aneurysm or dissection.    < end of copied text >

## 2021-01-07 NOTE — CONSULT NOTE ADULT - ASSESSMENT
41 y/o female PMHx of asthma, anxiety, HLD, crohn's disease, R hand nerve damage s/p epidural, arthritis, and herniated discs presents to the ED for evaluation of chests pain for past hour prior to arrival.     IMPRESSION  Atypical chest pain- doubt cardiac, most likely panic attack    PLAN    INCOMPLETE NOTE PENDING ATTENDING RECOMMENDATIONS   41 y/o female PMHx of asthma, anxiety, HLD, crohn's disease, R hand nerve damage s/p epidural, arthritis, and herniated discs presents to the ED for evaluation of chests pain for past hour prior to arrival.     IMPRESSION  Atypical chest pain- doubt cardiac, most likely panic attack  Minimal non-obstructive CAD LCX on CTA 2019    PLAN  Outpatient exercise stress test   39 y/o female PMHx of asthma, anxiety, HLD, crohn's disease, R hand nerve damage s/p epidural, arthritis, and herniated discs presents to the ED for evaluation of chests pain for past hour prior to arrival.     IMPRESSION  Atypical chest pain- doubt cardiac, most likely panic attack  Minimal non-obstructive CAD LCX on CTA 2019  Chest pain resolved  Negative cardiac biomarkers  No ECG changes    PLAN  No further inpatient cardiac w/u necessary  may d/c home from cardiac perspective  Outpatient exercise stress test   C/w statin, recommend ASA 81 if tolerated

## 2021-01-07 NOTE — H&P ADULT - HISTORY OF PRESENT ILLNESS
Pt is a 39 y/o female PMHx of asthma, anxiety, HLD, crohn's disease, R hand nerve damage s/p epidural, arthritis, and herniated discs presents to the ED for evaluation of chests pain for past hour prior to arrival. Pt reports that she does occasionally get some chest discomfort and had a CCTA 8/2019 with CAD-RADS 1. Pt reports that she has had a mild cough past few days and hasn't done any lifting or strenuous activity. Pain is described as acute in onset, started as left sided sharp pain and then progressed to "twisting" sensation radiating to back. Pain is so severe that it causes her to catch her breath. At time of onset pt was not doing any physical activity. Otherwise denies headache, ongoing dyspnea, edema of lower extremities, calf pain, fever, chills, or abdominal pain.    In the ED: tachycardic and hypertensive on triage. Pt appears very anxious. No STEMI on ekg. trops neg x1. BNP 35. Cardiology saw pt in ED, doubt cardiac in nature. No mediastinal widening on cxr. CT angio chest/abd/pelvis neg for aneurysm or dissection that was negative. COVID neg.
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
## 2021-01-12 DIAGNOSIS — J45.909 UNSPECIFIED ASTHMA, UNCOMPLICATED: ICD-10-CM

## 2021-01-12 DIAGNOSIS — I25.10 ATHEROSCLEROTIC HEART DISEASE OF NATIVE CORONARY ARTERY WITHOUT ANGINA PECTORIS: ICD-10-CM

## 2021-01-12 DIAGNOSIS — G56.91 UNSPECIFIED MONONEUROPATHY OF RIGHT UPPER LIMB: ICD-10-CM

## 2021-01-12 DIAGNOSIS — K50.90 CROHN'S DISEASE, UNSPECIFIED, WITHOUT COMPLICATIONS: ICD-10-CM

## 2021-01-12 DIAGNOSIS — Z79.82 LONG TERM (CURRENT) USE OF ASPIRIN: ICD-10-CM

## 2021-01-12 DIAGNOSIS — R00.0 TACHYCARDIA, UNSPECIFIED: ICD-10-CM

## 2021-01-12 DIAGNOSIS — M51.26 OTHER INTERVERTEBRAL DISC DISPLACEMENT, LUMBAR REGION: ICD-10-CM

## 2021-01-12 DIAGNOSIS — L30.9 DERMATITIS, UNSPECIFIED: ICD-10-CM

## 2021-01-12 DIAGNOSIS — R07.89 OTHER CHEST PAIN: ICD-10-CM

## 2021-01-12 DIAGNOSIS — Z82.49 FAMILY HISTORY OF ISCHEMIC HEART DISEASE AND OTHER DISEASES OF THE CIRCULATORY SYSTEM: ICD-10-CM

## 2021-01-12 DIAGNOSIS — F41.9 ANXIETY DISORDER, UNSPECIFIED: ICD-10-CM

## 2021-01-12 DIAGNOSIS — R07.9 CHEST PAIN, UNSPECIFIED: ICD-10-CM

## 2021-01-12 DIAGNOSIS — F17.210 NICOTINE DEPENDENCE, CIGARETTES, UNCOMPLICATED: ICD-10-CM

## 2021-01-12 DIAGNOSIS — M19.90 UNSPECIFIED OSTEOARTHRITIS, UNSPECIFIED SITE: ICD-10-CM

## 2021-02-18 ENCOUNTER — EMERGENCY (EMERGENCY)
Facility: HOSPITAL | Age: 41
LOS: 0 days | Discharge: HOME | End: 2021-02-18
Attending: EMERGENCY MEDICINE | Admitting: EMERGENCY MEDICINE
Payer: MEDICAID

## 2021-02-18 VITALS
HEART RATE: 113 BPM | SYSTOLIC BLOOD PRESSURE: 159 MMHG | DIASTOLIC BLOOD PRESSURE: 94 MMHG | RESPIRATION RATE: 17 BRPM | OXYGEN SATURATION: 98 % | HEIGHT: 64 IN | TEMPERATURE: 99 F

## 2021-02-18 DIAGNOSIS — F41.9 ANXIETY DISORDER, UNSPECIFIED: ICD-10-CM
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
## 2021-02-18 DIAGNOSIS — J45.909 UNSPECIFIED ASTHMA, UNCOMPLICATED: ICD-10-CM

## 2021-02-18 DIAGNOSIS — M25.562 PAIN IN LEFT KNEE: ICD-10-CM
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
## 2021-02-18 DIAGNOSIS — S50.311A ABRASION OF RIGHT ELBOW, INITIAL ENCOUNTER: ICD-10-CM

## 2021-02-18 DIAGNOSIS — R51.9 HEADACHE, UNSPECIFIED: ICD-10-CM

## 2021-02-18 DIAGNOSIS — Z23 ENCOUNTER FOR IMMUNIZATION: ICD-10-CM

## 2021-02-18 PROCEDURE — 73562 X-RAY EXAM OF KNEE 3: CPT | Mod: 26,LT

## 2021-02-18 PROCEDURE — 70490 CT SOFT TISSUE NECK W/O DYE: CPT | Mod: 26

## 2021-02-18 PROCEDURE — 99285 EMERGENCY DEPT VISIT HI MDM: CPT

## 2021-02-18 PROCEDURE — 73080 X-RAY EXAM OF ELBOW: CPT | Mod: 26,RT

## 2021-02-18 PROCEDURE — 70450 CT HEAD/BRAIN W/O DYE: CPT | Mod: 26

## 2021-02-18 RX ORDER — TETANUS TOXOID, REDUCED DIPHTHERIA TOXOID AND ACELLULAR PERTUSSIS VACCINE, ADSORBED 5; 2.5; 8; 8; 2.5 [IU]/.5ML; [IU]/.5ML; UG/.5ML; UG/.5ML; UG/.5ML
0.5 SUSPENSION INTRAMUSCULAR ONCE
Refills: 0 | Status: COMPLETED | OUTPATIENT
Start: 2021-02-18 | End: 2021-02-18

## 2021-02-18 RX ORDER — OXYCODONE AND ACETAMINOPHEN 5; 325 MG/1; MG/1
1 TABLET ORAL ONCE
Refills: 0 | Status: DISCONTINUED | OUTPATIENT
Start: 2021-02-18 | End: 2021-02-18

## 2021-02-18 RX ADMIN — OXYCODONE AND ACETAMINOPHEN 1 TABLET(S): 5; 325 TABLET ORAL at 19:05

## 2021-02-18 RX ADMIN — TETANUS TOXOID, REDUCED DIPHTHERIA TOXOID AND ACELLULAR PERTUSSIS VACCINE, ADSORBED 0.5 MILLILITER(S): 5; 2.5; 8; 8; 2.5 SUSPENSION INTRAMUSCULAR at 19:01
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
## 2021-02-18 NOTE — ED ADULT NURSE NOTE - PMH
Anxiety    Asthma    Crohn's disease without complication, unspecified gastrointestinal tract location    Eczema    Lumbar herniated disc    Neuropathy  right arm/hand
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
## 2021-02-18 NOTE — ED PROVIDER NOTE - PATIENT PORTAL LINK FT
You can access the FollowMyHealth Patient Portal offered by [facility] by registering at the following website: [email or web address]. By joining [employer]’s FollowMyHealth portal, you will also be able to view your health information using other applications (apps) compatible with our system.
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
## 2021-02-18 NOTE — ED PROVIDER NOTE - PHYSICAL EXAMINATION
CONST: Well appearing in NAD  EYES: PERRL, EOMI, Sclera and conjunctiva clear.   ENT: No nasal discharge.  Oropharynx normal appearing, no erythema or exudates. No abscess or swelling. Uvula midline. No temporal artery or mastoid tenderness.  NECK: Non-tender, no meningeal signs. normal ROM. supple   CARD: Normal S1 S2; Normal rate and rhythm  RESP: Equal BS B/L, No wheezes, rhonchi or rales. No distress  GI: Soft, non-tender, non-distended. no cva tenderness. normal BS  MS: tenderness and swelling to right elbow, Normal ROM in all extremities. No midline spinal tenderness. pulses 2 +. no calf tenderness or swelling  SKIN: small abrasion to right elbow. Warm, dry, no acute rashes. Good turgor  NEURO: A&Ox3, No focal deficits. Strength 5/5 with no sensory deficits. Steady gait. Finger to nose intact. Negative pronator drift

## 2021-02-19 PROBLEM — L30.9 DERMATITIS, UNSPECIFIED: Chronic | Status: ACTIVE | Noted: 2021-01-07

## 2021-02-19 PROBLEM — G62.9 POLYNEUROPATHY, UNSPECIFIED: Chronic | Status: ACTIVE | Noted: 2021-01-07

## 2021-02-19 PROBLEM — M51.26 OTHER INTERVERTEBRAL DISC DISPLACEMENT, LUMBAR REGION: Chronic | Status: ACTIVE | Noted: 2021-01-07

## 2021-06-08 ENCOUNTER — EMERGENCY (EMERGENCY)
Facility: HOSPITAL | Age: 41
LOS: 0 days | Discharge: LEFT BEFORE DISCHARGE | End: 2021-06-09
Attending: EMERGENCY MEDICINE | Admitting: EMERGENCY MEDICINE
Payer: MEDICAID

## 2021-06-08 VITALS
RESPIRATION RATE: 18 BRPM | HEART RATE: 93 BPM | TEMPERATURE: 98 F | DIASTOLIC BLOOD PRESSURE: 82 MMHG | HEIGHT: 64 IN | OXYGEN SATURATION: 95 % | WEIGHT: 130.07 LBS | SYSTOLIC BLOOD PRESSURE: 132 MMHG

## 2021-06-08 DIAGNOSIS — F41.9 ANXIETY DISORDER, UNSPECIFIED: ICD-10-CM

## 2021-06-08 DIAGNOSIS — Z79.51 LONG TERM (CURRENT) USE OF INHALED STEROIDS: ICD-10-CM

## 2021-06-08 DIAGNOSIS — Z79.899 OTHER LONG TERM (CURRENT) DRUG THERAPY: ICD-10-CM

## 2021-06-08 DIAGNOSIS — Z86.69 PERSONAL HISTORY OF OTHER DISEASES OF THE NERVOUS SYSTEM AND SENSE ORGANS: ICD-10-CM

## 2021-06-08 DIAGNOSIS — F43.10 POST-TRAUMATIC STRESS DISORDER, UNSPECIFIED: ICD-10-CM

## 2021-06-08 DIAGNOSIS — J45.909 UNSPECIFIED ASTHMA, UNCOMPLICATED: ICD-10-CM

## 2021-06-08 DIAGNOSIS — Z87.2 PERSONAL HISTORY OF DISEASES OF THE SKIN AND SUBCUTANEOUS TISSUE: ICD-10-CM

## 2021-06-08 DIAGNOSIS — Z87.39 PERSONAL HISTORY OF OTHER DISEASES OF THE MUSCULOSKELETAL SYSTEM AND CONNECTIVE TISSUE: ICD-10-CM

## 2021-06-08 DIAGNOSIS — F32.9 MAJOR DEPRESSIVE DISORDER, SINGLE EPISODE, UNSPECIFIED: ICD-10-CM

## 2021-06-08 DIAGNOSIS — F41.0 PANIC DISORDER [EPISODIC PAROXYSMAL ANXIETY]: ICD-10-CM

## 2021-06-08 DIAGNOSIS — F17.200 NICOTINE DEPENDENCE, UNSPECIFIED, UNCOMPLICATED: ICD-10-CM

## 2021-06-08 DIAGNOSIS — Z87.19 PERSONAL HISTORY OF OTHER DISEASES OF THE DIGESTIVE SYSTEM: ICD-10-CM

## 2021-06-08 PROCEDURE — 99283 EMERGENCY DEPT VISIT LOW MDM: CPT
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
## 2021-06-09 NOTE — ED PROVIDER NOTE - ATTENDING CONTRIBUTION TO CARE
I personally evaluated the patient. I reviewed the Resident’s or Physician Assistant’s note (as assigned above), and agree with the findings and plan except as documented in my note.    40 yo female hx of chronic neck/back pain 2/2 herniated disc/ PTSD/Depression presents c/o panic attack when she was walking in the mall with her sister this evening. Denies any SI or HI. Reports chronic pain and she is seeing pain management specialist for it. No CP, SOB. No fever. No trauma.    CONSTITUTIONAL: Appears very anxious  SKIN: skin exam is warm and dry, no acute rash.  HEAD: Normocephalic; atraumatic.  EYES: PERRL, 3 mm bilateral, no nystagmus, EOM intact; conjunctiva and sclera clear.  ENT: No nasal discharge; airway clear.  NECK: Supple; non tender.+ full passive ROM in all directions. No JVD  CARD: S1, S2 normal; no murmurs, gallops, or rubs. Regular rate and rhythm. + Symmetric Strong Pulses  RESP: No wheezes, rales or rhonchi. Good air movement bilaterally  ABD: soft; non-distended; non-tender.

## 2021-06-09 NOTE — ED PROVIDER NOTE - MDM PATIENT STATEMENT FOR ADDL TREATMENT
Recorded as Task  Date: 03/20/2017 02:06 PM, Created By: Fatimah Dow  Task Name: 4. Patient Message  Assigned To: Fatimah Dow  Regarding Patient: JOÃO CRANE, Status: In Progress  Comment:   Fatimah Dow - 20 Mar 2017 2:06 PM    TASK CREATED  Pt called and left a message. She is in need of some lab orders. Rn did call the pt back and she stated she was told to get a blood test to determine if the pt had mold in her system.  Pt was not aware of what the test was and was hoping Dr. Kennedy would know what to order.  LIO KENNEDY - 21 Mar 2017 8:41 AM    TASK REPLIED TO: Previously Assigned To LIO KENNEDY  Region 7 respiratory allergen panel, hypersensitivity pneumonitis panel  Fatimah Dow - 21 Mar 2017 8:45 AM    TASK EDITED  aFtimah Dow - 21 Mar 2017 9:12 AM    TASK EDITED  Rn did call the pt and let her know the orders have been sent to [facility].  Pt to go to the lab at her convience for these lab draws.  Fatimah Dow - 21 Mar 2017 9:12 AM    TASK IN PROGRESS  Fatimah Dow - 21 Mar 2017 4:17 PM    TASK EDITED  Pt called and left a message-wanting to get the labs done.  Rn did call the pt back -had to leave another message.  Ok for pt to go to [facility] to have her labs drawn.  Fatimah Dow - 24 Mar 2017 2:13 PM    TASK EDITED  Rn checked the [facility] system-labs not done yet.      Electronically signed by:Fatimah Dow R.N.  Mar 24 2017  2:14PM CST     Patient with one or more new problems requiring additional work-up/treatment.
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
## 2021-06-09 NOTE — ED PROVIDER NOTE - PROGRESS NOTE DETAILS
patient denies SI and HI. drowsy in ED. wants pain med (percocet) for her chronic pain. offered motrin and advised continue gabapentin as prescribed by her PMD. Patient left ED prior to dc pt eloped prior to my evaluation

## 2021-06-09 NOTE — ED PROVIDER NOTE - NS ED ROS FT
Constitutional: no fever, chills, no recent weight loss, change in appetite or malaise  Eyes: no redness/discharge/pain/vision changes  ENT: no rhinorrhea/ear pain/sore throat  Cardiac: No chest pain, SOB or edema.  Respiratory: No cough or respiratory distress  GI: No nausea, vomiting, diarrhea or abdominal pain.  : No dysuria, frequency, urgency or hematuria  MS: + chronic neck/back pain. no loss of ROM, no weakness  Neuro: No headache or weakness. No LOC.  Skin: No skin rash.  Endocrine: No history of thyroid disease or diabetes.

## 2021-06-09 NOTE — ED PROVIDER NOTE - NSFOLLOWUPINSTRUCTIONS_ED_ALL_ED_FT
Anxiety    Generalized anxiety disorder (ELDA) is a mental disorder. It is defined as anxiety that is not necessarily related to specific events or activities or is out of proportion to said events. Symptoms include restlessness, fatigue, difficulty concentrations, irritability and difficulty concentrating. It interferes with life functions, including relationships, work, and school. If you were started on a medication make sure to take exactly as prescribed and follow up with a psychiatrist.    SEEK IMMEDIATE MEDICAL CARE IF YOU HAVE THE FOLLOWING SYMPTOMS: thoughts about hurting killing yourself, thoughts about hurting or killing somebody else, hallucinations, or worsening depression.

## 2021-06-09 NOTE — ED PROVIDER NOTE - CARE PLAN
Principal Discharge DX:	Anxiety disorder  Secondary Diagnosis:	PTSD (post-traumatic stress disorder)

## 2021-06-09 NOTE — ED PROVIDER NOTE - PHYSICAL EXAMINATION
CONSTITUTIONAL: Well-appearing; well-nourished; in no apparent distress.   EYES: PERRL; EOM intact. pupils 3mm b/l.   CARDIOVASCULAR: Normal S1, S2; no murmurs, rubs, or gallops.   RESPIRATORY: Normal chest excursion with respiration; breath sounds clear and equal bilaterally; no wheezes, rhonchi, or rales.  GI/: Normal bowel sounds; non-distended; non-tender; no palpable organomegaly.   MS: No evidence of trauma or deformity to extremities.   SKIN: Normal for age and race; warm; dry; good turgor; no apparent lesions or exudate.   NEURO/PSYCH: A & O x 4; grossly unremarkable. + anxious mood. denies SI/HI and a/v hallucination. Ambulate with steady gait.

## 2021-07-13 NOTE — H&P ADULT - NSHPPOASURGSITEINCISION_GEN_ALL_CORE
Final Diagnosis:  1  Seizure-like activity (HCC)    2  Schizoaffective disorder, bipolar type ([facility] )    3  Hx of drug abuse ([facility] )    4  Impaired cognition      ED Course as of Jul 13 1446 Mon Jul 12, 2021 2100 Procedure Note: EKG  Date/Time: 07/12/21 21003  Interpreted by: Nicky Braswell  Indications / Diagnosis: Sizure  ECG reviewed by me, the ED Provider: yes   The EKG demonstrates:  Rhythm: normal sinus  Intervals: normal intervals  Axis: rightward axis  QRS/Blocks: normal QRS  ST Changes: No acute ST Changes, no STD/TERESA           2115 Hemoglobin(!): 11 8   2132 Subtherapeutic   VALPROIC ACID TOTAL(!): 38   2135 Slightly elevated   Ammonia(!): 39   2146 AST: 13   2146 ALT: 23   2237 Negative   CT head without contrast     Chief Complaint   Patient presents with    Seizure - New Onset     pt was seen walking on the street, fell over and began having a seizure  pt nonverbal but awake for EMS   pre-hospital   pt slow to answer questions on arrival but able to verify name and birthday and states he does not have a hx of seizures  denies drug use today but has hx of such  ASSESSMENT + PLAN:   - Nursing note reviewed  1  Seizure-like activity  -postictal, now delayed to respond, but patient says this may be his baseline  -point of care glucose was normal  -EKG, tox panel, basic workup to evaluate for causes of seizure  -CT head/CT cervical  -history of schizophrenia on Depakote, will obtain ammonia and Depakote levels  -IV fluids, offered patient pain control, patient declined  -patient at baseline per him, normal scans, generally reassuring laboratory studies other than a slightly elevated ammonia  -filled out PA 's license form and and informed patient that he should not be driving, or do any other activities that he may be harmed her others may be harmed if he was to seize again  -will have patient follow-up with Neurology    Cervical Collar Clearance:     The patient had a CT scan of the cervical spine demonstrating no acute injury  On exam, the patient had no midline point tenderness or paresthesias/numbness/weakness in the extremities  The patient had full range of motion (was then able to flex, extend, and rotate head laterally) without pain  There were no distracting injuries and the patient was not intoxicated  The patient's cervical spine was cleared radiologically and clinically  Cervical collar removed at this time  Final Dispo   Patient was reassessed  Vital signs stable  Patient and/or family given discharge instructions and return precautions  Patient and/or family was reassured  The patient and/or family vocalizes understanding  Answered all of the patient's and/or family's questions  Will follow up with Neurology  Patient and/or family are agreeable to the plan  Medications   sodium chloride 0 9 % bolus 1,000 mL (0 mL Intravenous Stopped 7/12/21 2248)     Time reflects when diagnosis was documented in both MDM as applicable and the Disposition within this note     Time User Action Codes Description Comment    7/12/2021 10:50 PM Vernon Kim Add [R56 9] Seizure-like activity ([facility] )     7/12/2021 10:50 PM Vernon Kim Add [F25 0] Schizoaffective disorder, bipolar type ([facility] )     7/12/2021 10:50 PM Vernestine Headings Add [F19 11] Hx of drug abuse ([facility] )     7/12/2021 10:50 PM Vernestine Headings Add [R41 89] Impaired cognition       ED Disposition     ED Disposition Condition Date/Time Comment    Discharge Stable Mon Jul 12, 2021 10:50 PM Karen Barroso discharge to home/self care              Follow-up Information     Follow up With Specialties Details Why Contact Info Additional Information    [facility] Neu[facility] Call   [address] [facility] [id number]  [address] [place] [address], [address] [address], [facility] [address]        Discharge Medication List as of 7/12/2021 10:51 PM      CONTINUE these medications which have NOT CHANGED    Details   acetaminophen (TYLENOL) 650 mg CR tablet Take 1 tablet (650 mg total) by mouth every 8 (eight) hours as needed for mild pain, Starting Fri 7/2/2021, Normal      benztropine (COGENTIN) 1 mg tablet Take 1 tablet (1 mg total) by mouth 2 (two) times a day, Starting Fri 3/12/2021, Until Mon 7/12/2021, Print      !! divalproex sodium (DEPAKOTE) 250 mg EC tablet Take 1 tablet (250 mg total) by mouth every evening Take with Depakote 500 mg for a total evening Depakote dose of 750 mg, Starting Fri 3/12/2021, Until Mon 7/12/2021, Print      !! divalproex sodium (DEPAKOTE) 500 mg EC tablet Take 1 tablet (500 mg total) by mouth 2 (two) times a day Take evening dose with Depakote 250 mg for a total Depakote dose of 500 mg daily and 750 mg in the evening, Starting Fri 3/12/2021, Until Mon 7/12/2021, Print      loxapine (LOXITANE) 50 MG capsule Multiple Dosages:Starting Fri 3/12/2021, Until Tue 5/11/2021 at 2359Take 1 capsule (50 mg total) by mouth 2 (two) times a day AND 2 capsules (100 mg total) daily at bedtime  , Print       !! - Potential duplicate medications found  Please discuss with provider  No discharge procedures on file  Prior to Admission Medications   Prescriptions Last Dose Informant Patient Reported?  Taking?   acetaminophen (TYLENOL) 650 mg CR tablet   No Yes   Sig: Take 1 tablet (650 mg total) by mouth every 8 (eight) hours as needed for mild pain   benztropine (COGENTIN) 1 mg tablet   No Yes   Sig: Take 1 tablet (1 mg total) by mouth 2 (two) times a day   divalproex sodium (DEPAKOTE) 250 mg EC tablet   No Yes   Sig: Take 1 tablet (250 mg total) by mouth every evening Take with Depakote 500 mg for a total evening Depakote dose of 750 mg   divalproex sodium (DEPAKOTE) 500 mg EC tablet   No Yes   Sig: Take 1 tablet (500 mg total) by mouth 2 (two) times a day Take evening dose with Depakote 250 mg for a total Depakote dose of 500 mg daily and 750 mg in the evening   loxapine (LOXITANE) 50 MG capsule   No Yes   Sig: Take 1 capsule (50 mg total) by mouth 2 (two) times a day AND 2 capsules (100 mg total) daily at bedtime  Facility-Administered Medications: None       History of Present Illness: This is a 29 y o  male presenting today for evaluation of possible seizure  Patient has a history of drug abuse, schizoaffective affective disorder and takes Depakote  Today per EMS patient was walking and may have fallen/started to have a seizure  This is reportedly his 1st seizure  I do not see any history of seizures in his history  Patient was postictal   No tongue lacerations or bowel or bladder incontinence  Point of care glucose on EMS arrival was normal   Patient is a little slow to respond, but he does answer all questions appropriately  He is currently in a C-collar  He does not complain a net of any pain  He actually has no complaints  He denies any recent drug use  No fever, chills, chest pain, shortness of breath, headache, abdominal pain,  nausea, vomiting, diarrhea, rash or any other complaints  Patient notes that they have been eating/drinking normally       - No language barrier    - History obtained from patient and chart and EMS   - patient a little slow to respond, but otherwise no limitations to the history obtained  - Previous charting was reviewed  Some data reviewed included below for ease of access whether or not it is relevant to this patient encounter  Past Medical, Past Surgical History:    has a past medical history of Drug abuse ([facility] ) and Schizoaffective disorder ([facility] )  has a past surgical history that includes No past surgeries       Allergies:   No Known Allergies    Social and Family History:     Social History     Substance and Sexual Activity   Alcohol Use Yes    Comment: social     Social History     Tobacco Use   Smoking Status Current Every Day Smoker   Smokeless Tobacco Never Used     Social History     Substance and Sexual Activity   Drug Use Yes    Types: Marijuana, Other    Comment: K2       Review of Systems:   Review of Systems   Constitutional: Negative for chills, diaphoresis and fever  HENT: Negative  Eyes: Negative  Negative for visual disturbance  Respiratory: Negative  Negative for shortness of breath  Cardiovascular: Negative  Negative for chest pain  Gastrointestinal: Negative  Negative for abdominal pain, nausea and vomiting  Endocrine: Negative  Genitourinary: Negative  Musculoskeletal: Negative  Negative for myalgias  Skin: Negative  Negative for rash  Allergic/Immunologic: Negative  Neurological: Positive for seizures and speech difficulty  Negative for weakness, light-headedness, numbness and headaches  Hematological: Negative  Psychiatric/Behavioral: Positive for behavioral problems  All other systems reviewed and are negative  Physical Examination     Vitals:    07/12/21 2042 07/12/21 2045   BP: 106/64    Pulse: (!) 107    Resp: 19    Temp:  98 7 °F (37 1 °C)   TempSrc:  Oral   SpO2: 95%      Vitals reviewed by me  Physical Exam  Vitals and nursing note reviewed  Constitutional:       Appearance: He is well-developed  He is not ill-appearing  HENT:      Head: Normocephalic and atraumatic  No raccoon eyes, Patterson's sign or contusion  Right Ear: External ear normal  No hemotympanum  Left Ear: External ear normal  No hemotympanum  Mouth/Throat:      Mouth: Mucous membranes are moist       Comments: No tongue lacerations  Eyes:      Extraocular Movements: Extraocular movements intact  Conjunctiva/sclera: Conjunctivae normal       Pupils: Pupils are equal, round, and reactive to light  Neck:      Comments: In cervical collar, but no tenderness  Cardiovascular:      Rate and Rhythm: Normal rate and regular rhythm     Pulmonary:      Effort: Pulmonary effort is normal       Breath sounds: Normal breath sounds  Chest:      Chest wall: No tenderness  Abdominal:      General: There is no distension  Palpations: Abdomen is soft  Tenderness: There is no abdominal tenderness  Musculoskeletal:         General: Normal range of motion  Right shoulder: No tenderness or bony tenderness  Left shoulder: No tenderness or bony tenderness  Right elbow: No tenderness  Left elbow: No tenderness  Right wrist: No tenderness  Left wrist: No tenderness  Cervical back: No tenderness or bony tenderness  Thoracic back: Normal  No tenderness or bony tenderness  Lumbar back: Normal  No tenderness or bony tenderness  Right hip: No tenderness or bony tenderness  Left hip: No tenderness or bony tenderness  Right knee: No tenderness  Left knee: No tenderness  Right ankle: No tenderness  Left ankle: No tenderness  Right foot: No tenderness  Left foot: No tenderness  Skin:     General: Skin is warm and dry  Neurological:      Mental Status: He is alert and oriented to person, place, and time  Cranial Nerves: No cranial nerve deficit  Sensory: No sensory deficit  Motor: No weakness or abnormal muscle tone  Gait: Gait normal       Comments: Patient with delayed her verbal response, he does said this is normal for him  He does take about 10-15 seconds to respond, but he does answer all my questions appropriately    He also does have some delayed past pointing   Psychiatric:      Comments: Slight respond, but appropriate                           ED Course as of Jul 13 1446   Mon Jul 12, 2021   2100 Procedure Note: EKG  Date/Time: 07/12/21 21003  Interpreted by: Tawanda Blind  Indications / Diagnosis: Sizure  ECG reviewed by me, the ED Provider: yes   The EKG demonstrates:  Rhythm: normal sinus  Intervals: normal intervals  Axis: rightward axis  QRS/Blocks: normal QRS  ST Changes: No acute ST Changes, no STD/TERESA           2115 Hemoglobin(!): 11 8   2132 Subtherapeutic   VALPROIC ACID TOTAL(!): 38   2135 Slightly elevated   Ammonia(!): 39   2146 AST: 13   2146 ALT: 23   2237 Negative   CT head without contrast     CT spine cervical without contrast   ED Interpretation   No acute osseous abnormality as read by me      Final Result      No cervical spine fracture or traumatic malalignment  Workstation performed: [id number]         CT head without contrast   ED Interpretation   No acute intracranial abnormality as read by me      Final Result      No acute intracranial abnormality  Workstation performed: [id number]           Orders Placed This Encounter   Procedures    CT spine cervical without contrast    CT head without contrast    Valproic acid level, total    Ammonia    Hepatic function panel    Basic metabolic panel    CBC and differential    Ethanol    Salicylate level    Acetaminophen level-If concentration is detectable, please discuss with medical  on call   Rapid drug screen, urine    EKG RESULTS    ECG 12 lead    ECG 12 lead       Labs:     Labs Reviewed   VALPROIC ACID LEVEL, TOTAL - Abnormal       Result Value Ref Range Status    Valproic Acid, Total 38 (*) 50 - 100 ug/mL Final   AMMONIA - Abnormal    Ammonia 39 (*) 11 - 35 umol/L Final    Comment: Specimen collection should occur prior to Sulfapyridine administration due to the potential for falsely depressed results  HEPATIC FUNCTION PANEL - Abnormal    Total Bilirubin 0 12 (*) 0 20 - 1 00 mg/dL Final    Comment: Use of this assay is not recommended for patients undergoing treatment with eltrombopag due to the potential for falsely elevated results  Bilirubin, Direct 0 06  0 00 - 0 20 mg/dL Final    Alkaline Phosphatase 86  46 - 116 U/L Final    AST 13  5 - 45 U/L Final    Comment: Specimen collection should occur prior to Sulfasalazine administration due to the potential for falsely depressed results       ALT 23  12 - 78 U/L Final    Comment: Specimen collection should occur prior to Sulfasalazine administration due to the potential for falsely depressed results  Total Protein 6 3 (*) 6 4 - 8 2 g/dL Final    Albumin 3 5  3 5 - 5 0 g/dL Final   CBC AND DIFFERENTIAL - Abnormal    WBC 6 42  4 31 - 10 16 Thousand/uL Final    RBC 3 86 (*) 3 88 - 5 62 Million/uL Final    Hemoglobin 11 8 (*) 12 0 - 17 0 g/dL Final    Hematocrit 36 0 (*) 36 5 - 49 3 % Final    MCV 93  82 - 98 fL Final    MCH 30 6  26 8 - 34 3 pg Final    MCHC 32 8  31 4 - 37 4 g/dL Final    RDW 15 9 (*) 11 6 - 15 1 % Final    MPV 10 0  8 9 - 12 7 fL Final    Platelets 239  160 - 390 Thousands/uL Final    nRBC 0  /100 WBCs Final    Neutrophils Relative 52  43 - 75 % Final    Immat GRANS % 0  0 - 2 % Final    Lymphocytes Relative 37  14 - 44 % Final    Monocytes Relative 10  4 - 12 % Final    Eosinophils Relative 1  0 - 6 % Final    Basophils Relative 0  0 - 1 % Final    Neutrophils Absolute 3 26  1 85 - 7 62 Thousands/µL Final    Immature Grans Absolute 0 01  0 00 - 0 20 Thousand/uL Final    Lymphocytes Absolute 2 40  0 60 - 4 47 Thousands/µL Final    Monocytes Absolute 0 65  0 17 - 1 22 Thousand/µL Final    Eosinophils Absolute 0 08  0 00 - 0 61 Thousand/µL Final    Basophils Absolute 0 02  0 00 - 0 10 Thousands/µL Final   ACETAMINOPHEN LEVEL - Abnormal    Acetaminophen Level 2 4 (*) 10 - 20 ug/mL Final    Comment: If concentration is detectable, please discuss with medical  on call     MEDICAL ALCOHOL - Normal    Ethanol Lvl <3  0 - 3 mg/dL Final   SALICYLATE LEVEL - Normal    Salicylate Lvl 4 9  3 - 20 mg/dL Final   BASIC METABOLIC PANEL    Sodium 648  136 - 145 mmol/L Final    Potassium 4 4  3 5 - 5 3 mmol/L Final    Chloride 105  100 - 108 mmol/L Final    CO2 28  21 - 32 mmol/L Final    ANION GAP 10  4 - 13 mmol/L Final    BUN 6  5 - 25 mg/dL Final    Creatinine 0 87  0 60 - 1 30 mg/dL Final    Comment: Standardized to IDMS reference method Glucose 92  65 - 140 mg/dL Final    Comment: If the patient is fasting, the ADA then defines impaired fasting glucose as > 100 mg/dL and diabetes as > or equal to 123 mg/dL  Specimen collection should occur prior to Sulfasalazine administration due to the potential for falsely depressed results  Specimen collection should occur prior to Sulfapyridine administration due to the potential for falsely elevated results  Calcium 8 9  8 3 - 10 1 mg/dL Final    eGFR 113  ml/min/1 73sq m Final    Narrative:     [place] guidelines for Chronic Kidney Disease (CKD):     Stage 1 with normal or high GFR (GFR > 90 mL/min/1 73 square meters)    Stage 2 Mild CKD (GFR = 60-89 mL/min/1 73 square meters)    Stage 3A Moderate CKD (GFR = 45-59 mL/min/1 73 square meters)    Stage 3B Moderate CKD (GFR = 30-44 mL/min/1 73 square meters)    Stage 4 Severe CKD (GFR = 15-29 mL/min/1 73 square meters)    Stage 5 End Stage CKD (GFR <15 mL/min/1 73 square meters)  Note: GFR calculation is accurate only with a steady state creatinine   COMA PANEL    Narrative: The following orders were created for panel order Coma panel  Procedure                               Abnormality         Status                     ---------                               -----------         ------                     [id number]]                      Normal              Final result               Salicylate [facility][[id number]]             Normal              Final result               Acetaminophen level-If c  Bartholome Pinks Bartholome Pinks [[id number]]  Abnormal            Final result                 Please view results for these tests on the individual orders  Imaging:   No results found  Final Diagnosis:  1  Seizure-like activity (HCC)    2  Schizoaffective disorder, bipolar type ([facility] )    3  Hx of drug abuse ([facility] )    4  Impaired cognition        Code Status: Prior    Portions of the record may have been created with voice recognition software  Occasional wrong word or "sound a like" substitutions may have occurred due to the inherent limitations of voice recognition software  Read the chart carefully and recognize, using context, where substitutions have occurred      Electronically signed by:  Mike Light, PGY 3, MD Lucina Gama MD  07/13/21 0544 no
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
## 2021-10-12 NOTE — ED ADULT NURSE REASSESSMENT NOTE - NS ED NURSE REASSESS COMMENT FT1
patient awake/alert/oriented. v/s stable. no acute distress. iv patent. safety maintained. patient offered no complaints at this time Private car

## 2021-11-30 NOTE — H&P ADULT - GA GEN PE INS DISTRESS LEV PC PC
Patient here for appointment and states there is an issue on her test strips at the pharmacy. I did a PA and it is available without authorization. I called pharmacy to check on the issue. I advised I did a PA on it. The tech ran the strips and it is approved now with no copay. Patient informed.   moderate

## 2021-12-17 NOTE — ED ADULT NURSE NOTE - PAIN RATING/NUMBER SCALE (0-10): ACTIVITY
D/w mom, all structures in the ultrasound were normal  No hernia,labs mom requested were  normal  Mom asks \"what is next step\"  Child has no pain now  Mom wants to know why she had pain the in the past     I am not able to answer her question, child has no pain now  The stool color suspected to be tomato which she was eating multiple .  Mom has requested GI and genetics consults which were entered  AT this time child with no pain   6

## 2022-05-09 NOTE — ED ADULT TRIAGE NOTE - SPO2 (%)
99

## 2022-08-23 NOTE — PROGRESS NOTE ADULT - RESPIRATORY
Plan: Recommend minimum of SPF 30+ daily to the sun exposed areas. Reapply every 2 hours.
detailed exam
Detail Level: Zone
detailed exam

## 2022-12-23 ENCOUNTER — EMERGENCY (EMERGENCY)
Facility: HOSPITAL | Age: 42
LOS: 0 days | Discharge: LEFT BEFORE DISCHARGE | End: 2022-12-23
Payer: MEDICAID

## 2022-12-23 DIAGNOSIS — Z02.9 ENCOUNTER FOR ADMINISTRATIVE EXAMINATIONS, UNSPECIFIED: ICD-10-CM

## 2022-12-23 PROCEDURE — L9981: CPT

## 2022-12-28 ENCOUNTER — OUTPATIENT (OUTPATIENT)
Dept: OUTPATIENT SERVICES | Facility: HOSPITAL | Age: 42
LOS: 1 days | Discharge: HOME | End: 2022-12-28

## 2023-01-01 NOTE — DISCHARGE NOTE ADULT - LAUNCH MEDICATION RECONCILIATION
Initial Clinical Review    Admission: Date/Time/Statement:   Admission Orders (From admission, onward)     Ordered        12/30/22 1843  Inpatient Admission  Once                      Orders Placed This Encounter   Procedures   • Inpatient Admission     Standing Status:   Standing     Number of Occurrences:   1     Order Specific Question:   Level of Care     Answer:   Critical Care [15]     Order Specific Question:   Estimated length of stay     Answer:   More than 2 Midnights     Order Specific Question:   Certification     Answer:   I certify that inpatient services are medically necessary for this patient for a duration of greater than two midnights  See H&P and MD Progress Notes for additional information about the patient's course of treatment  Initial Presentation: 76 y o  male Admitted Inpatient to [address] with Acute Thoracoabdominal Aortic Dissection  Pt was transferred to [facility] from [address] [place] [address] ED where he initially presented with sudden onset back pain with LLE pain and intermittent numbness, found to have Type B aortic dissection and transferred to [facility] for vascular and CTS inputs with higher level of care  PMHx of Afib, HTN, UC s/p total colectomy, CKD3, arthritis, JORGE and COPD  Arrived on nicardipine gtt, will add esmolol gtt  On exam, he is AOx3, Tachycardic, irregular rhythm  Breathing comfortably on RA, hoarse voice  Gross sensation intact in all four extremities, equal right to left  Palpable DP pulses bilaterally, no LE edema  5/5 strength in all four extremities with exception of 4+/5 on left hip flexion patient reports limitations 2/2 chronic pain  Plan: Q1H neurovascular checks  Pt instructed to notify staff with any new/changing symptoms  Goal HR <70 bpm and SBP <120  Thoracic and CTS consults  TTE ordered  Monitor renal function closely, especially given dissection plane potentially involving right renal artery  Patient had episode of CRIS 11/2022    Maintain MAP >65 mmHg and avoid nephrotoxins as able  Continue pta po meds and nebs for COPD  CPAP @ hs  Analgesics prn  Continue synthroid    Vascular consult 12/30 -- Acute type B aortic dissection -- No indication for intervention at this time  Continue medical management, pain control, neurovascular checks  Anti-impulse therapy and strict BP control with SBP <140 and HR <70  ASA, Statin  CTSx on board  Will need repeat imaging in future      Date: 12/31  Day 2:  Progressive delirium and agitation  Impulsive  Seroquel 12 5mg x 1  Pt' sedated on Precedex infusion for agitation and delirium and impulsiveness  Pt non focal, strength equal bilaterally, using bedside commode with minimal 1 person assist  Pt attempting to get out of bed, impulsive, not redirectable  Orientation wax and wanes throughout shift, at times Oriented x 1 then at other times oriented x 3   continue neurovascular checks q 1h      CTSx consult -- acute Type B aortic dissection and an ascending aneurysm measuring 4 5cm  Our recommendation is medical management, continued surveillance and good BP control  We will arrange for op f/u with CLEMENCIA Uribe  in 1 month with repeat CTA CAP at that time  Ht pt was comfortable with our recommendations, and their questions were answered to their satisfaction  Date: 1/1/23    Day 3: Has surpassed a 2nd midnight with active treatments and services  HR/BP well-controlled with oral Lopressor  Echo completed with result below  Now AA&O x3  Continues on precedex gtt  Cont neurovascular checks, change to q2h  Serial neuro exams  Continue po meds, supportive care          Wt Readings from Last 1 Encounters:   01/01/23 90 kg (198 lb 6 6 oz)     Additional Vital Signs:   Date/Time Temp Pulse Resp BP MAP (mmHg) SpO2 O2 Device   01/01/23 0811 -- 83 -- 121/58 78 -- --   01/01/23 0800 99 °F (37 2 °C) 80 -- 106/62 76 96 % None (Room air)   01/01/23 0200 -- 75 25 Abnormal  145/103 Abnormal  120 93 % --   01/01/23 0000 98 2 °F (36 8 °C) 80 20 125/69 89 95 % --   12/31/22 2000 98 °F (36 7 °C) 76 -- 125/74 94 97 % --   12/31/22 1800 -- 87 -- 124/78 97 -- --   12/31/22 1715 -- 109 Abnormal  28 Abnormal  115/65 86 92 % --   12/31/22 1700 -- 117 Abnormal  26 Abnormal  136/74 98 93 % --   12/31/22 1600 -- 128 Abnormal  33 Abnormal  123/78 94 91 % --   12/31/22 1200 -- 109 Abnormal  16 102/60 76 93 % --   12/31/22 1140 -- 124 Abnormal  30 Abnormal  143/64 89 93 % --   12/31/22 1100 -- 114 Abnormal  27 Abnormal  124/72 85 94 % --   12/31/22 1002 -- 117 Abnormal  -- 105/57 -- -- --   12/31/22 0900 98 2 °F (36 8 °C) 107 Abnormal  24 Abnormal  95/68 78 96 % --   12/31/22 0840 -- 96 35 Abnormal  106/71 83 95 % --   12/31/22 0830 -- 90 25 Abnormal  99/62 76 92 % --   12/31/22 0800 -- 89 16 108/64 81 95 % --   12/31/22 0730 -- 107 Abnormal  39 Abnormal  122/72 89 94 % --   12/31/22 0710 -- 110 Abnormal  41 Abnormal  115/77 88 97 % --   12/31/22 0400 98 °F (36 7 °C) 88 20 113/67 86 98 % None (Room air)   12/31/22 0000 98 °F (36 7 °C) 86 15 119/77 91 97 % --   12/30/22 2200 -- 106 Abnormal  20 104/66 79 90 % --   12/30/22 2100 -- 106 Abnormal  20 131/74 87 93 % --   12/30/22 2020 98 °F (36 7 °C) 86 20 85/59 Abnormal  68 92 % --   12/30/22 1830 -- 97 26 Abnormal  107/67 81 95 % --     Pertinent Labs/Diagnostic Test Results:   12/31 ECHO: EF 55%  Systolic function is normal  RV is mildly dilated  Systolic function is mildly reduced  LA is moderately dilated (42-48 mL/m2), mild AR, moderate MR,  aortic root is mildly dilated at 4 0 cm/2 0 cm/m2      EKG 12/30:   Atrial fibrillation  Abnormal ECG  When compared with ECG of 06-MAY-2022 12:14,  No significant change was found  Confirmed by Edjabari Carpio ([id number]) on 12/31/2022 3:28:42 PM    EKG 12/31:  Atrial fibrillation  Nonspecific T wave abnormality  Prolonged QT  Abnormal ECG  When compared with ECG of 30-DEC-2022 15:55, (unconfirmed)  No significant change was found  Confirmed by Rosa Carpio [id number]) on 12/31/2022 3:27:40 PM    CT head wo contrast    (Results Pending)     CTA c/a/p 12/30:  There is a long segment aortic dissection which originates just distal to the takeoff of the left subclavian artery and extends into the bilateral common iliac arteries, as detailed above  Diminished perfusion of the right kidney relative to the left  Pulmonary emphysema  Postop changes of the bowel without bowel obstruction  Ascending thoracic aortic aneurysm measuring 4 5 cm     At the time of the dictation, the emergency department physician was aware of the presence of the dissection and was arranging for patient to be transported for further care at the [facility]         Results from last 7 days   Lab Units 01/01/23 0421 12/31/22 0458 12/30/22  1542   WBC Thousand/uL 10 89* 9 45 11 20*   HEMOGLOBIN g/dL 13 3 13 0 13 4   HEMATOCRIT % 41 9 41 2 41 8   PLATELETS Thousands/uL 157 172 190   NEUTROS ABS Thousands/µL  --   --  8 97*       Results from last 7 days   Lab Units 01/01/23 0421 12/31/22 0458 12/30/22  1542   SODIUM mmol/L 144 138 144   POTASSIUM mmol/L 4 1 4 2 4 4   CHLORIDE mmol/L 115* 109* 110*   CO2 mmol/L 23 25 24   ANION GAP mmol/L 6 4 10   BUN mg/dL 26* 23 29*   CREATININE mg/dL 1 57* 1 54* 1 62*   EGFR ml/min/1 73sq m 42 43 40   CALCIUM mg/dL 9 5 9 4 9 6   MAGNESIUM mg/dL  --  2 2  --    PHOSPHORUS mg/dL  --  2 9  --      Results from last 7 days   Lab Units 12/31/22 0458 12/30/22  1542   AST U/L 32 21   ALT U/L 22 16   ALK PHOS U/L 85 76   TOTAL PROTEIN g/dL 6 5 6 8   ALBUMIN g/dL 3 1* 3 8   TOTAL BILIRUBIN mg/dL 1 45* 0 85     Results from last 7 days   Lab Units 01/01/23 0421 12/31/22  0458 12/30/22  1542   GLUCOSE RANDOM mg/dL 140 117 96     Results from last 7 days   Lab Units 12/30/22  1542   HS TNI 0HR ng/L 13     Results from last 7 days   Lab Units 12/30/22  2124   PROTIME seconds 15 0*   INR  1 16   PTT seconds 30     Results from last 7 days   Lab Units 12/30/22  1542   LIPASE u/L 24 Past Medical History:   Diagnosis Date   • Arthritis    • H/O echocardiogram 12/2018 9/2018   • History of EKG 2019    numerous tests between 9/6/2018 through 2/7/2019   • History of exercise stress test 10/2018   • History of Holter monitoring 09/2018   • Hyperlipidemia    • Hypertension    • Irregular heart beat    • Kidney stone      Present on Admission:  • Aortic dissection, thoracoabdominal (HCC)  • COPD (chronic obstructive pulmonary disease) (HCC)  • Stage 3a chronic kidney disease (HCC)  • Atrial fibrillation, persistent (HCC)  • Hypertension  • Hypothyroidism  • Severe obstructive sleep apnea  • Left hip pain      Admitting Diagnosis: Aortic dissection ([facility] [address] ) [I71 00]  Age/Sex: 76 y o  male  Admission Orders:  Scheduled Medications:  allopurinol, 100 mg, Oral, Daily  apixaban, 5 mg, Oral, BID  atorvastatin, 20 mg, Oral, Daily  levothyroxine, 75 mcg, Oral, Daily  melatonin, 6 mg, Oral, Q24H  metoprolol succinate, 100 mg, Oral, BID  QUEtiapine, 12 5 mg, Oral, HS  umeclidinium, 1 puff, Inhalation, Daily    Medications 12/23 12/24 12/25 12/26 12/27 12/28 12/29 12/30 12/31 01/01   dexmedeTOMIDine (Precedex) 400 mcg in sodium chloride 0 9% 100 mL  Rate: 2 2-15 7 mL/hr Dose: 0 1-0 7 mcg/kg/hr  Weight Dosing Info: 89 8 kg  Freq: Titrated Route: IV  Last Dose: Stopped (01/01/23 0738)  Start: 12/31/22 1700 End: 01/01/23 0742   Admin Instructions:   Initial dose 0 4 mcg/kg/hr  Titrate by 0 2 mcg/kg/hr every 30 minutes to achieve target sedation goal as ordered  High-alert medication!    Order specific questions:   Target RASS 0: Alert and calm            8391     7812     2057     2112     2137     2201     2211     2319      0200     0230     0315     0634     0738     0742-D/C'd      esmolol (BREVIBLOC) 2500 mg/250 mL IV infusion (premix)  Rate: 13 7-109 4 mL/hr Dose:  mcg/kg/min  Weight Dosing Info: 91 2 kg  Freq: Titrated Route: IV  Last Dose: Stopped (12/31/22 1459)  Start: 12/30/22 1930 End: 01/01/23 0323   Admin Instructions:   Initial dose 25 mcg/kg/min  Titrate by 25 mcg/kg/min every 10 minutes to achieve target HR less than 70  High-alert medication! 2000 2036 2053 2106 2137     [facility] 86      1141     1403     1459 [C]      0323-D/C'd  0540        niCARdipine (CARDENE) 25 mg (STANDARD CONCENTRATION) in sodium chloride 0 9% 250 mL  Rate:  mL/hr Dose: 1-15 mg/hr  Freq: Titrated Route: IV  Last Dose: Stopped (12/30/22 2320)  Start: 12/30/22 1930 End: 12/31/22 1105   Admin Instructions:   Initial dose: 5 mg/hr  Titrate by 2 5 mg/hr every 15 minutes to achieve target systolic blood pressure less than 130 mmHg  Central line administration recommended  Peripheral IV sites must be changed every 12 hours  High-alert medication! Look-alike/sound-alike medication! Refrigerate  2000 2024 2105 2320      1105-D/C'd  1500             PRN Meds:  acetaminophen, 975 mg, Oral, Q8H PRN  albuterol, 2 puff, Inhalation, Q4H PRN  hydrALAZINE, 10 mg, Intravenous, Q6H PRN  oxyCODONE, 5 mg, Oral, Q6H PRN        Network Utilization Review Department  ATTENTION: Please call with any questions or concerns to [phone] and carefully listen to the prompts so that you are directed to the right person  All voicemails are confidential   Allyn Skipper all requests for admission clinical reviews, approved or denied determinations and any other requests to dedicated fax number below belonging to the campus where the patient is receiving treatment   List of dedicated fax numbers for the Facilities:  [address] [facility] [address] DENIALS (Administrative/Medical Necessity) [phone]   [address] (Maternity/NICU/Pediatrics) [address] [facility] [address] [place] [address] [address] [place] [address] [place] [facility] [address] [facility] 310 [facility] [place] [address] [phone] <<-----Click here for Discharge Medication Review

## 2023-01-04 DIAGNOSIS — K02.63 DENTAL CARIES ON SMOOTH SURFACE PENETRATING INTO PULP: ICD-10-CM

## 2023-01-07 ENCOUNTER — EMERGENCY (EMERGENCY)
Facility: HOSPITAL | Age: 43
LOS: 0 days | Discharge: HOME | End: 2023-01-08
Attending: EMERGENCY MEDICINE | Admitting: EMERGENCY MEDICINE
Payer: MEDICAID

## 2023-01-07 VITALS
OXYGEN SATURATION: 97 % | HEART RATE: 102 BPM | RESPIRATION RATE: 18 BRPM | TEMPERATURE: 96 F | SYSTOLIC BLOOD PRESSURE: 134 MMHG | DIASTOLIC BLOOD PRESSURE: 82 MMHG

## 2023-01-07 DIAGNOSIS — I25.10 ATHEROSCLEROTIC HEART DISEASE OF NATIVE CORONARY ARTERY WITHOUT ANGINA PECTORIS: ICD-10-CM

## 2023-01-07 DIAGNOSIS — F17.200 NICOTINE DEPENDENCE, UNSPECIFIED, UNCOMPLICATED: ICD-10-CM

## 2023-01-07 DIAGNOSIS — U07.1 COVID-19: ICD-10-CM

## 2023-01-07 DIAGNOSIS — R07.89 OTHER CHEST PAIN: ICD-10-CM

## 2023-01-07 DIAGNOSIS — Z82.49 FAMILY HISTORY OF ISCHEMIC HEART DISEASE AND OTHER DISEASES OF THE CIRCULATORY SYSTEM: ICD-10-CM

## 2023-01-07 DIAGNOSIS — J45.909 UNSPECIFIED ASTHMA, UNCOMPLICATED: ICD-10-CM

## 2023-01-07 DIAGNOSIS — K50.90 CROHN'S DISEASE, UNSPECIFIED, WITHOUT COMPLICATIONS: ICD-10-CM

## 2023-01-07 LAB
ALBUMIN SERPL ELPH-MCNC: 4.4 G/DL — SIGNIFICANT CHANGE UP (ref 3.5–5.2)
ALP SERPL-CCNC: 81 U/L — SIGNIFICANT CHANGE UP (ref 30–115)
ALT FLD-CCNC: 20 U/L — SIGNIFICANT CHANGE UP (ref 0–41)
ANION GAP SERPL CALC-SCNC: 13 MMOL/L — SIGNIFICANT CHANGE UP (ref 7–14)
AST SERPL-CCNC: 22 U/L — SIGNIFICANT CHANGE UP (ref 0–41)
BASOPHILS # BLD AUTO: 0.05 K/UL — SIGNIFICANT CHANGE UP (ref 0–0.2)
BASOPHILS NFR BLD AUTO: 0.7 % — SIGNIFICANT CHANGE UP (ref 0–1)
BILIRUB SERPL-MCNC: 0.3 MG/DL — SIGNIFICANT CHANGE UP (ref 0.2–1.2)
BUN SERPL-MCNC: 7 MG/DL — LOW (ref 10–20)
CALCIUM SERPL-MCNC: 9.6 MG/DL — SIGNIFICANT CHANGE UP (ref 8.4–10.5)
CHLORIDE SERPL-SCNC: 98 MMOL/L — SIGNIFICANT CHANGE UP (ref 98–110)
CO2 SERPL-SCNC: 23 MMOL/L — SIGNIFICANT CHANGE UP (ref 17–32)
CREAT SERPL-MCNC: 0.6 MG/DL — LOW (ref 0.7–1.5)
EGFR: 115 ML/MIN/1.73M2 — SIGNIFICANT CHANGE UP
EOSINOPHIL # BLD AUTO: 0.26 K/UL — SIGNIFICANT CHANGE UP (ref 0–0.7)
EOSINOPHIL NFR BLD AUTO: 3.4 % — SIGNIFICANT CHANGE UP (ref 0–8)
GLUCOSE SERPL-MCNC: 94 MG/DL — SIGNIFICANT CHANGE UP (ref 70–99)
HCG SERPL QL: NEGATIVE — SIGNIFICANT CHANGE UP
HCT VFR BLD CALC: 43.3 % — SIGNIFICANT CHANGE UP (ref 37–47)
HGB BLD-MCNC: 15.3 G/DL — SIGNIFICANT CHANGE UP (ref 12–16)
IMM GRANULOCYTES NFR BLD AUTO: 0.3 % — SIGNIFICANT CHANGE UP (ref 0.1–0.3)
LYMPHOCYTES # BLD AUTO: 3.07 K/UL — SIGNIFICANT CHANGE UP (ref 1.2–3.4)
LYMPHOCYTES # BLD AUTO: 40.3 % — SIGNIFICANT CHANGE UP (ref 20.5–51.1)
MCHC RBC-ENTMCNC: 33.4 PG — HIGH (ref 27–31)
MCHC RBC-ENTMCNC: 35.3 G/DL — SIGNIFICANT CHANGE UP (ref 32–37)
MCV RBC AUTO: 94.5 FL — SIGNIFICANT CHANGE UP (ref 81–99)
MONOCYTES # BLD AUTO: 0.48 K/UL — SIGNIFICANT CHANGE UP (ref 0.1–0.6)
MONOCYTES NFR BLD AUTO: 6.3 % — SIGNIFICANT CHANGE UP (ref 1.7–9.3)
NEUTROPHILS # BLD AUTO: 3.74 K/UL — SIGNIFICANT CHANGE UP (ref 1.4–6.5)
NEUTROPHILS NFR BLD AUTO: 49 % — SIGNIFICANT CHANGE UP (ref 42.2–75.2)
NRBC # BLD: 0 /100 WBCS — SIGNIFICANT CHANGE UP (ref 0–0)
PLATELET # BLD AUTO: 240 K/UL — SIGNIFICANT CHANGE UP (ref 130–400)
POTASSIUM SERPL-MCNC: 3.9 MMOL/L — SIGNIFICANT CHANGE UP (ref 3.5–5)
POTASSIUM SERPL-SCNC: 3.9 MMOL/L — SIGNIFICANT CHANGE UP (ref 3.5–5)
PROT SERPL-MCNC: 6.8 G/DL — SIGNIFICANT CHANGE UP (ref 6–8)
RBC # BLD: 4.58 M/UL — SIGNIFICANT CHANGE UP (ref 4.2–5.4)
RBC # FLD: 12.9 % — SIGNIFICANT CHANGE UP (ref 11.5–14.5)
SODIUM SERPL-SCNC: 134 MMOL/L — LOW (ref 135–146)
TROPONIN T SERPL-MCNC: <0.01 NG/ML — SIGNIFICANT CHANGE UP
WBC # BLD: 7.62 K/UL — SIGNIFICANT CHANGE UP (ref 4.8–10.8)
WBC # FLD AUTO: 7.62 K/UL — SIGNIFICANT CHANGE UP (ref 4.8–10.8)

## 2023-01-07 PROCEDURE — 93010 ELECTROCARDIOGRAM REPORT: CPT | Mod: 77

## 2023-01-07 PROCEDURE — 93010 ELECTROCARDIOGRAM REPORT: CPT

## 2023-01-07 PROCEDURE — 71046 X-RAY EXAM CHEST 2 VIEWS: CPT | Mod: 26

## 2023-01-07 PROCEDURE — 99285 EMERGENCY DEPT VISIT HI MDM: CPT

## 2023-01-07 RX ORDER — HYDROXYZINE HCL 10 MG
25 TABLET ORAL ONCE
Refills: 0 | Status: COMPLETED | OUTPATIENT
Start: 2023-01-07 | End: 2023-01-07

## 2023-01-07 RX ORDER — MORPHINE SULFATE 50 MG/1
4 CAPSULE, EXTENDED RELEASE ORAL ONCE
Refills: 0 | Status: DISCONTINUED | OUTPATIENT
Start: 2023-01-07 | End: 2023-01-07

## 2023-01-07 RX ORDER — KETOROLAC TROMETHAMINE 30 MG/ML
30 SYRINGE (ML) INJECTION ONCE
Refills: 0 | Status: DISCONTINUED | OUTPATIENT
Start: 2023-01-07 | End: 2023-01-07

## 2023-01-07 RX ORDER — ACETAMINOPHEN 500 MG
650 TABLET ORAL ONCE
Refills: 0 | Status: COMPLETED | OUTPATIENT
Start: 2023-01-07 | End: 2023-01-07

## 2023-01-07 RX ADMIN — MORPHINE SULFATE 4 MILLIGRAM(S): 50 CAPSULE, EXTENDED RELEASE ORAL at 23:44

## 2023-01-07 RX ADMIN — Medication 30 MILLIGRAM(S): at 20:20

## 2023-01-07 RX ADMIN — Medication 30 MILLIGRAM(S): at 20:50

## 2023-01-07 RX ADMIN — Medication 650 MILLIGRAM(S): at 21:00

## 2023-01-07 RX ADMIN — Medication 25 MILLIGRAM(S): at 23:05

## 2023-01-07 RX ADMIN — Medication 650 MILLIGRAM(S): at 21:30

## 2023-01-07 NOTE — ED PROVIDER NOTE - NS ED ROS FT
Eyes:  No visual changes, eye pain or discharge.  ENMT:  No hearing changes, pain, no sore throat or runny nose, no difficulty swallowing  Cardiac:  + chest pain x 1 hour.  No chest pain with exertion.  Respiratory:  No cough or respiratory distress.    GI:  No nausea, vomiting, diarrhea or abdominal pain.  :  No dysuria, frequency or burning.  MS:  No myalgia, muscle weakness, joint pain or back pain.  Neuro:  No headache or weakness.  No LOC.  Skin:  No skin rash.   Endocrine: No history of thyroid disease or diabetes.

## 2023-01-07 NOTE — ED PROVIDER NOTE - OBJECTIVE STATEMENT
43 yo F pmh of asthma, chrons, chronic back pain presents with chest pain. Started 1 hour prior to arrival, started at rest. Left chest, constant, non radiating, sharp. no n/v, no abdominal pain. no similar episodes in the past. no numbness, tingling or weakness. + daily smoker. Mom had MI's in her 60s. no previous cardiac evaluation.

## 2023-01-07 NOTE — ED ADULT NURSE NOTE - OBJECTIVE STATEMENT
Pt presents to the ed c/o left sided chest pain x 2 hours ago. Pt states she has been having worsening  anxiety attacks today. Pain is non radiating and is unaffected by breathing. Pt's skin is warm and dry, B/l breath sounds are clear.

## 2023-01-07 NOTE — ED PROVIDER NOTE - PHYSICAL EXAMINATION
CONSTITUTIONAL: Well-developed; well-nourished; in no acute distress.   SKIN: warm, dry  HEAD: Normocephalic; atraumatic.  EYES: PERRL, EOMI, no conjunctival erythema  ENT: No nasal discharge; airway clear.  NECK: Supple; non tender.  CARD: S1, S2 normal;  Regular rate and rhythm.   RESP: No wheezes, rales or rhonchi.  ABD: soft non tender, non distended, no rebound or guarding  EXT: Normal ROM.  5/5 strength in all 4 extremities   LYMPH: No acute cervical adenopathy.  NEURO: Alert, oriented, grossly unremarkable. neurovascularly intact  PSYCH: Cooperative, appropriate.

## 2023-01-08 VITALS
HEART RATE: 72 BPM | WEIGHT: 134.92 LBS | RESPIRATION RATE: 16 BRPM | OXYGEN SATURATION: 99 % | TEMPERATURE: 97 F | SYSTOLIC BLOOD PRESSURE: 111 MMHG | DIASTOLIC BLOOD PRESSURE: 59 MMHG

## 2023-01-08 LAB
SARS-COV-2 RNA SPEC QL NAA+PROBE: DETECTED
TROPONIN T SERPL-MCNC: <0.01 NG/ML — SIGNIFICANT CHANGE UP

## 2023-01-08 PROCEDURE — 75574 CT ANGIO HRT W/3D IMAGE: CPT | Mod: 26,MA

## 2023-01-08 PROCEDURE — 99236 HOSP IP/OBS SAME DATE HI 85: CPT

## 2023-01-08 PROCEDURE — 93010 ELECTROCARDIOGRAM REPORT: CPT

## 2023-01-08 RX ORDER — MELOXICAM 15 MG/1
1 TABLET ORAL
Qty: 10 | Refills: 0
Start: 2023-01-08 | End: 2023-01-17

## 2023-01-08 RX ORDER — ALBUTEROL 90 UG/1
2 AEROSOL, METERED ORAL
Qty: 1 | Refills: 0
Start: 2023-01-08 | End: 2023-01-08

## 2023-01-08 RX ORDER — ALBUTEROL 90 UG/1
2.5 AEROSOL, METERED ORAL ONCE
Refills: 0 | Status: COMPLETED | OUTPATIENT
Start: 2023-01-08 | End: 2023-01-08

## 2023-01-08 RX ORDER — METOPROLOL TARTRATE 50 MG
50 TABLET ORAL ONCE
Refills: 0 | Status: COMPLETED | OUTPATIENT
Start: 2023-01-08 | End: 2023-01-08

## 2023-01-08 RX ORDER — ATORVASTATIN CALCIUM 80 MG/1
1 TABLET, FILM COATED ORAL
Qty: 30 | Refills: 0
Start: 2023-01-08 | End: 2023-02-06

## 2023-01-08 RX ORDER — MORPHINE SULFATE 50 MG/1
4 CAPSULE, EXTENDED RELEASE ORAL ONCE
Refills: 0 | Status: DISCONTINUED | OUTPATIENT
Start: 2023-01-08 | End: 2023-01-08

## 2023-01-08 RX ADMIN — ALBUTEROL 2.5 MILLIGRAM(S): 90 AEROSOL, METERED ORAL at 15:13

## 2023-01-08 RX ADMIN — MORPHINE SULFATE 4 MILLIGRAM(S): 50 CAPSULE, EXTENDED RELEASE ORAL at 08:30

## 2023-01-08 RX ADMIN — Medication 50 MILLIGRAM(S): at 06:18

## 2023-01-08 RX ADMIN — MORPHINE SULFATE 4 MILLIGRAM(S): 50 CAPSULE, EXTENDED RELEASE ORAL at 09:45

## 2023-01-08 RX ADMIN — Medication 50 MILLIGRAM(S): at 09:45

## 2023-01-08 RX ADMIN — MORPHINE SULFATE 4 MILLIGRAM(S): 50 CAPSULE, EXTENDED RELEASE ORAL at 11:00

## 2023-01-08 RX ADMIN — Medication 104 MILLIGRAM(S): at 08:00

## 2023-01-08 RX ADMIN — Medication 125 MILLIGRAM(S): at 09:45

## 2023-01-08 NOTE — ED CDU PROVIDER DISPOSITION NOTE - NSFOLLOWUPINSTRUCTIONS_ED_ALL_ED_FT
You have mild coronary artery disease  You must stop smoking  Take one baby aspirin daily  Take lipitor 80 mg daily  Follow up with cardiology    Take mobic for the pain   Follow up with Dr. Scott for pain management    Take albuterol as needed for asthma  Take prednisone for asthma as directed.

## 2023-01-08 NOTE — ED CDU PROVIDER INITIAL DAY NOTE - PROGRESS NOTE DETAILS
Pt accepted by Dr. Zuniga. Hx of similar pain 4 months ago. + tobacco use. Pt reports sharp pain to the left breast area. NO rash. Had diarrhea and vomiting 2 weeks ago. no cough. No shortness of breath. On exam S1S2 rrr, no murmur, no chest wall tenderness, no rash of chest, + bilateral rhochi noted.  NO abdominal tenderness, no extremity swelling or tenderness. Treated with morphine for the pain last night. Pt appears comfortable however requests morphine. IV is 20 gauge>>will request to change to 18 gauge. Morphine. istop. Will need metoprolol for rate control. Will treat with solumedrol 125 mg for respiratory findings. Istop neg for opioids. This report was requested by: Barbara Tim | Reference #: [id number]. Occasional benzo use. Pt drank a portion of cola-cola. Pt advised not to drink caffeine prior to CCTA.

## 2023-01-08 NOTE — ED CDU PROVIDER DISPOSITION NOTE - PATIENT PORTAL LINK FT
You can access the FollowMyHealth Patient Portal offered by [facility] by registering at the following website: [email or web address]. By joining [employer]’s FollowMyHealth portal, you will also be able to view your health information using other applications (apps) compatible with our system.

## 2023-01-08 NOTE — ED CDU PROVIDER DISPOSITION NOTE - CLINICAL COURSE
Pt presented with constant pain to the left chest for a day. Hx of same in the past. Hx of asthma. + tobacco-risk of smoking discussed. CCTA found minimal to mild disease. Pt advised to take albuterol prn, aspirin 81 mg daily, lipitor 80 mg daily, follow up cardiology, follow up pain management. pt used to go to pain management in the past. Pain of the chest may be secondary to multiple HNP that pt states she has. Mobic for pain. ICG. Pt presented with constant pain to the left chest for a day. Hx of same in the past. Hx of asthma. + tobacco-risk of smoking discussed. CCTA found minimal to mild disease. Pt advised to take albuterol prn, aspirin 81 mg daily, lipitor 80 mg daily, follow up cardiology, follow up pain management. pt used to go to pain management in the past. Pain of the chest may be secondary to multiple HNP that pt states she has. Mobic for pain. ICG. Additionally pt found to be COVID positive

## 2023-01-08 NOTE — ED CDU PROVIDER DISPOSITION NOTE - CARE PROVIDER_API CALL
Parag Swain (MD)  Cardiovascular Disease; Internal Medicine; Interventional Cardiology  [address] [place] [address]  Phone: [phone]  Fax: [phone]  Follow Up Time: 4-6 Days    Joaquim Scott)  Anesthesiology; Pain Medicine  [facility] [place] [address]  Phone: [phone]  Fax: [phone]  Follow Up Time: 4-6 Days

## 2023-01-08 NOTE — ED CDU PROVIDER INITIAL DAY NOTE - PHYSICAL EXAMINATION
CONSTITUTIONAL: Well-developed; well-nourished; in no acute distress.   SKIN: warm, dry.  no rashes   HEAD: Normocephalic; atraumatic.  EYES: PERRL, EOMI, no conjunctival erythema  ENT: No nasal discharge; airway clear.  NECK: Supple; non tender.  CARD: S1, S2 normal;  Regular rate and rhythm.   RESP: No wheezes, rales or rhonchi.  ABD: soft non tender, non distended, no rebound or guarding  EXT: Normal ROM.  5/5 strength in all 4 extremities   LYMPH: No acute cervical adenopathy.  NEURO: Alert, oriented, grossly unremarkable. neurovascularly intact  PSYCH: Cooperative, appropriate.

## 2023-01-08 NOTE — ED CDU PROVIDER DISPOSITION NOTE - CARE PROVIDERS DIRECT ADDRESSES
[email or web address]

## 2023-01-08 NOTE — ED CDU PROVIDER INITIAL DAY NOTE - NS ED ROS FT
Eyes:  No visual changes, eye pain or discharge.  ENMT:  No hearing changes, pain, no sore throat or runny nose, no difficulty swallowing  Cardiac:  + chest pain. No chest pain with exertion.  Respiratory:  No cough or respiratory distress.    GI:  No nausea, vomiting, diarrhea or abdominal pain.  :  No dysuria, frequency or burning.  MS:  No myalgia, muscle weakness, joint pain or back pain.  Neuro:  No headache or weakness.  No LOC.  Skin:  No skin rash.   Endocrine: No history of thyroid disease or diabetes.

## 2023-01-08 NOTE — ED CDU PROVIDER INITIAL DAY NOTE - CLINICAL SUMMARY MEDICAL DECISION MAKING FREE TEXT BOX
Patient presents with chest pain. labs, ekg, cxr done. placed in obs for further cardiac evaluation.

## 2023-01-08 NOTE — ED CDU PROVIDER DISPOSITION NOTE - PROVIDER TOKENS
PROVIDER:[TOKEN:[[id number]],FOLLOWUP:[4-6 Days]],PROVIDER:[TOKEN:[[id number]],FOLLOWUP:[4-6 Days]]

## 2023-01-09 NOTE — ED ADULT TRIAGE NOTE - PATIENT ON (OXYGEN DELIVERY METHOD)
1. Reason for Call:Seeking treatment for depression/anxiety    2. What type of treatment are you seeking today? MH IOP     What times can patient attend program? any    Can patient do virtual or in-person? virtual       4.  Insurance verified? Yes        7.  Call outcome:Scheduled phone assessment              
room air

## 2023-01-20 ENCOUNTER — APPOINTMENT (OUTPATIENT)
Dept: CARDIOLOGY | Facility: CLINIC | Age: 43
End: 2023-01-20

## 2023-02-01 NOTE — ED ADULT TRIAGE NOTE - NS ED TRIAGE AVPU SCALE
Fair Alert-The patient is alert, awake and responds to voice. The patient is oriented to time, place, and person. The triage nurse is able to obtain subjective information.

## 2023-02-09 ENCOUNTER — EMERGENCY (EMERGENCY)
Facility: HOSPITAL | Age: 43
LOS: 0 days | Discharge: HOME | End: 2023-02-09
Attending: EMERGENCY MEDICINE
Payer: MEDICAID

## 2023-02-09 VITALS
DIASTOLIC BLOOD PRESSURE: 74 MMHG | OXYGEN SATURATION: 99 % | HEIGHT: 64 IN | TEMPERATURE: 97 F | SYSTOLIC BLOOD PRESSURE: 127 MMHG | RESPIRATION RATE: 16 BRPM | HEART RATE: 81 BPM | WEIGHT: 130.07 LBS

## 2023-02-09 DIAGNOSIS — R55 SYNCOPE AND COLLAPSE: ICD-10-CM

## 2023-02-09 DIAGNOSIS — R51.9 HEADACHE, UNSPECIFIED: ICD-10-CM

## 2023-02-09 DIAGNOSIS — Z86.69 PERSONAL HISTORY OF OTHER DISEASES OF THE NERVOUS SYSTEM AND SENSE ORGANS: ICD-10-CM

## 2023-02-09 DIAGNOSIS — Z87.2 PERSONAL HISTORY OF DISEASES OF THE SKIN AND SUBCUTANEOUS TISSUE: ICD-10-CM

## 2023-02-09 DIAGNOSIS — F41.9 ANXIETY DISORDER, UNSPECIFIED: ICD-10-CM

## 2023-02-09 DIAGNOSIS — Z87.19 PERSONAL HISTORY OF OTHER DISEASES OF THE DIGESTIVE SYSTEM: ICD-10-CM

## 2023-02-09 DIAGNOSIS — Z87.39 PERSONAL HISTORY OF OTHER DISEASES OF THE MUSCULOSKELETAL SYSTEM AND CONNECTIVE TISSUE: ICD-10-CM

## 2023-02-09 LAB
ALBUMIN SERPL ELPH-MCNC: 4.7 G/DL — SIGNIFICANT CHANGE UP (ref 3.5–5.2)
ALP SERPL-CCNC: 73 U/L — SIGNIFICANT CHANGE UP (ref 30–115)
ALT FLD-CCNC: 23 U/L — SIGNIFICANT CHANGE UP (ref 0–41)
ANION GAP SERPL CALC-SCNC: 15 MMOL/L — HIGH (ref 7–14)
AST SERPL-CCNC: 24 U/L — SIGNIFICANT CHANGE UP (ref 0–41)
BASOPHILS # BLD AUTO: 0.05 K/UL — SIGNIFICANT CHANGE UP (ref 0–0.2)
BASOPHILS NFR BLD AUTO: 0.5 % — SIGNIFICANT CHANGE UP (ref 0–1)
BILIRUB SERPL-MCNC: <0.2 MG/DL — SIGNIFICANT CHANGE UP (ref 0.2–1.2)
BUN SERPL-MCNC: 12 MG/DL — SIGNIFICANT CHANGE UP (ref 10–20)
CALCIUM SERPL-MCNC: 9.2 MG/DL — SIGNIFICANT CHANGE UP (ref 8.4–10.5)
CHLORIDE SERPL-SCNC: 104 MMOL/L — SIGNIFICANT CHANGE UP (ref 98–110)
CO2 SERPL-SCNC: 20 MMOL/L — SIGNIFICANT CHANGE UP (ref 17–32)
CREAT SERPL-MCNC: 0.7 MG/DL — SIGNIFICANT CHANGE UP (ref 0.7–1.5)
EGFR: 110 ML/MIN/1.73M2 — SIGNIFICANT CHANGE UP
EOSINOPHIL # BLD AUTO: 0.25 K/UL — SIGNIFICANT CHANGE UP (ref 0–0.7)
EOSINOPHIL NFR BLD AUTO: 2.6 % — SIGNIFICANT CHANGE UP (ref 0–8)
GLUCOSE SERPL-MCNC: 85 MG/DL — SIGNIFICANT CHANGE UP (ref 70–99)
HCG SERPL QL: NEGATIVE — SIGNIFICANT CHANGE UP
HCT VFR BLD CALC: 46.1 % — SIGNIFICANT CHANGE UP (ref 37–47)
HGB BLD-MCNC: 16 G/DL — SIGNIFICANT CHANGE UP (ref 12–16)
IMM GRANULOCYTES NFR BLD AUTO: 0.3 % — SIGNIFICANT CHANGE UP (ref 0.1–0.3)
LYMPHOCYTES # BLD AUTO: 1.17 K/UL — LOW (ref 1.2–3.4)
LYMPHOCYTES # BLD AUTO: 12.2 % — LOW (ref 20.5–51.1)
MAGNESIUM SERPL-MCNC: 2.1 MG/DL — SIGNIFICANT CHANGE UP (ref 1.8–2.4)
MCHC RBC-ENTMCNC: 34 PG — HIGH (ref 27–31)
MCHC RBC-ENTMCNC: 34.7 G/DL — SIGNIFICANT CHANGE UP (ref 32–37)
MCV RBC AUTO: 98.1 FL — SIGNIFICANT CHANGE UP (ref 81–99)
MONOCYTES # BLD AUTO: 0.53 K/UL — SIGNIFICANT CHANGE UP (ref 0.1–0.6)
MONOCYTES NFR BLD AUTO: 5.5 % — SIGNIFICANT CHANGE UP (ref 1.7–9.3)
NEUTROPHILS # BLD AUTO: 7.54 K/UL — HIGH (ref 1.4–6.5)
NEUTROPHILS NFR BLD AUTO: 78.9 % — HIGH (ref 42.2–75.2)
NRBC # BLD: 0 /100 WBCS — SIGNIFICANT CHANGE UP (ref 0–0)
NT-PROBNP SERPL-SCNC: 8 PG/ML — SIGNIFICANT CHANGE UP (ref 0–300)
PLATELET # BLD AUTO: 261 K/UL — SIGNIFICANT CHANGE UP (ref 130–400)
POTASSIUM SERPL-MCNC: 4.2 MMOL/L — SIGNIFICANT CHANGE UP (ref 3.5–5)
POTASSIUM SERPL-SCNC: 4.2 MMOL/L — SIGNIFICANT CHANGE UP (ref 3.5–5)
PROT SERPL-MCNC: 7.1 G/DL — SIGNIFICANT CHANGE UP (ref 6–8)
RBC # BLD: 4.7 M/UL — SIGNIFICANT CHANGE UP (ref 4.2–5.4)
RBC # FLD: 12.9 % — SIGNIFICANT CHANGE UP (ref 11.5–14.5)
SODIUM SERPL-SCNC: 139 MMOL/L — SIGNIFICANT CHANGE UP (ref 135–146)
TROPONIN T SERPL-MCNC: <0.01 NG/ML — SIGNIFICANT CHANGE UP
WBC # BLD: 9.57 K/UL — SIGNIFICANT CHANGE UP (ref 4.8–10.8)
WBC # FLD AUTO: 9.57 K/UL — SIGNIFICANT CHANGE UP (ref 4.8–10.8)

## 2023-02-09 PROCEDURE — 99285 EMERGENCY DEPT VISIT HI MDM: CPT | Mod: 25

## 2023-02-09 PROCEDURE — 83880 ASSAY OF NATRIURETIC PEPTIDE: CPT

## 2023-02-09 PROCEDURE — 83735 ASSAY OF MAGNESIUM: CPT

## 2023-02-09 PROCEDURE — 70496 CT ANGIOGRAPHY HEAD: CPT | Mod: 26,MA

## 2023-02-09 PROCEDURE — 70496 CT ANGIOGRAPHY HEAD: CPT | Mod: MA

## 2023-02-09 PROCEDURE — 70498 CT ANGIOGRAPHY NECK: CPT | Mod: 26,MA

## 2023-02-09 PROCEDURE — 93005 ELECTROCARDIOGRAM TRACING: CPT

## 2023-02-09 PROCEDURE — 93010 ELECTROCARDIOGRAM REPORT: CPT

## 2023-02-09 PROCEDURE — 82962 GLUCOSE BLOOD TEST: CPT

## 2023-02-09 PROCEDURE — 85025 COMPLETE CBC W/AUTO DIFF WBC: CPT

## 2023-02-09 PROCEDURE — 70498 CT ANGIOGRAPHY NECK: CPT | Mod: MA

## 2023-02-09 PROCEDURE — 36415 COLL VENOUS BLD VENIPUNCTURE: CPT

## 2023-02-09 PROCEDURE — 80053 COMPREHEN METABOLIC PANEL: CPT

## 2023-02-09 PROCEDURE — 99285 EMERGENCY DEPT VISIT HI MDM: CPT

## 2023-02-09 PROCEDURE — 71045 X-RAY EXAM CHEST 1 VIEW: CPT

## 2023-02-09 PROCEDURE — 96375 TX/PRO/DX INJ NEW DRUG ADDON: CPT

## 2023-02-09 PROCEDURE — 96374 THER/PROPH/DIAG INJ IV PUSH: CPT

## 2023-02-09 PROCEDURE — 84484 ASSAY OF TROPONIN QUANT: CPT

## 2023-02-09 PROCEDURE — 70450 CT HEAD/BRAIN W/O DYE: CPT | Mod: MA

## 2023-02-09 PROCEDURE — 84703 CHORIONIC GONADOTROPIN ASSAY: CPT

## 2023-02-09 PROCEDURE — 71045 X-RAY EXAM CHEST 1 VIEW: CPT | Mod: 26

## 2023-02-09 RX ORDER — ACETAMINOPHEN 500 MG
975 TABLET ORAL ONCE
Refills: 0 | Status: COMPLETED | OUTPATIENT
Start: 2023-02-09 | End: 2023-02-09

## 2023-02-09 RX ORDER — METOCLOPRAMIDE HCL 10 MG
10 TABLET ORAL ONCE
Refills: 0 | Status: COMPLETED | OUTPATIENT
Start: 2023-02-09 | End: 2023-02-09

## 2023-02-09 RX ORDER — KETOROLAC TROMETHAMINE 30 MG/ML
30 SYRINGE (ML) INJECTION ONCE
Refills: 0 | Status: DISCONTINUED | OUTPATIENT
Start: 2023-02-09 | End: 2023-02-09

## 2023-02-09 RX ORDER — SODIUM CHLORIDE 9 MG/ML
1000 INJECTION, SOLUTION INTRAVENOUS ONCE
Refills: 0 | Status: COMPLETED | OUTPATIENT
Start: 2023-02-09 | End: 2023-02-09

## 2023-02-09 RX ADMIN — SODIUM CHLORIDE 1000 MILLILITER(S): 9 INJECTION, SOLUTION INTRAVENOUS at 12:41

## 2023-02-09 RX ADMIN — Medication 975 MILLIGRAM(S): at 14:30

## 2023-02-09 RX ADMIN — Medication 30 MILLIGRAM(S): at 14:35

## 2023-02-09 RX ADMIN — Medication 104 MILLIGRAM(S): at 14:32

## 2023-02-09 NOTE — ED PROVIDER NOTE - NSFOLLOWUPINSTRUCTIONS_ED_ALL_ED_FT
Our Emergency Department Referral Coordinators will be reaching out ot you in the next 24-48 hours from 9:00am to 5:00pm (Monday to Friday) with a follow up appointment. Please expect a phone call from the hospital in that time frame. If you do not receive a call or if you have any questions or concerns, you can reach them at [phone] or [phone].       Headache    A headache is pain or discomfort felt around the head or neck area. The specific cause of a headache may not be found as there are many types including tension headaches, migraine headaches, and cluster headaches. Watch your condition for any changes. Things you can do to manage your pain include taking over the counter and prescription medications as instructed by your health care provider, lying down in a dark quiet room, limiting stress, getting regular sleep, and refraining from alcohol and tobacco products.    SEEK IMMEDIATE MEDICAL CARE IF YOU HAVE ANY OF THE FOLLOWING SYMPTOMS: fever, vomiting, stiff neck, loss of vision, problems with speech, muscle weakness, loss of balance, trouble walking, passing out, or confusion.

## 2023-02-09 NOTE — ED PROVIDER NOTE - CLINICAL SUMMARY MEDICAL DECISION MAKING FREE TEXT BOX
Given history, exam and workup, low suspicion for ACS (recent CCTA and obs eval), HF, seizure (no postictal period, tongue laceration, bladder incontinence), stroke (no focal neuro deficits), HOCM (no murmur, family history of sudden death), ACS (neg troponin, no anginal pain), aortic dissection (no chest pain), malignant arrhythmia on ekg or any family history of sudden death, or GI bleed (stable hgb). Low suspicion for PE given normal vital signs, absence of chest pain or dyspnea, no evidence of DVT, no recent surgery/immobilization. Based on [place] syncope rule, patient is low risk and well appearing here, plan to discharge the patient home with PMD follow up. Return precautions discussed.

## 2023-02-09 NOTE — ED PROVIDER NOTE - PATIENT PORTAL LINK FT
You can access the FollowMyHealth Patient Portal offered by [facility] by registering at the following website: [email or web address]. By joining [employer]’s FollowMyHealth portal, you will also be able to view your health information using other applications (apps) compatible with our system.

## 2023-02-09 NOTE — ED PROVIDER NOTE - PROGRESS NOTE DETAILS
bh: Given history, exam and workup, low suspicion for ACS (recent CCTA and obs eval), HF, seizure (no postictal period, tongue laceration, bladder incontinence), stroke (no focal neuro deficits), HOCM (no murmur, family history of sudden death), ACS (neg troponin, no anginal pain), aortic dissection (no chest pain), malignant arrhythmia on ekg or any family history of sudden death, or GI bleed (stable hgb). Low suspicion for PE given normal vital signs, absence of chest pain or dyspnea, no evidence of DVT, no recent surgery/immobilization. Based on [place] syncope rule, patient is low risk and well appearing here, plan to discharge the patient home with PMD follow up. Return precautions discussed.

## 2023-02-09 NOTE — ED PROVIDER NOTE - ATTENDING APP SHARED VISIT CONTRIBUTION OF CARE
43-year-old female history of asthma, Crohn's, anxiety, denies significant PSH, smoker, denies daily EtOH use, presents status post syncopal episode, as per patient she was in her usual state of health this a.m., was standing in her kitchen at approximately 6 AM and experienced a syncopal episode, denies preceding symptoms, denies injuries secondary to syncope, currently reports frontal headache since the episode, denies fever, visual disturbances, neck pain, headache, palpitations, irregular heart-beat, chest pain / pressure, dyspnea, abnormal speech, paresthesias, clumsiness, /difficulty with coordination, focal weakness or neurological deficits, postural changes, gait disturbances, association with coughing or sneezing, new medication changes, change in caffeine, nicotine or alcohol intake, recent trauma or other associated complaints at present.    Previous cardiac eval; Patient was admitted in January 2023 for chest discomfort, at that time serial cardiac enzymes and EKG were unremarkable, CCTA showed CAD RADS 2 with nonobstructive CAD.  Old chart reviewed. I have reviewed and agree with the initial nursing note, except as documented in my note.    VSS, awake, alert, non-toxic appearing, VA normal BL, PERRL / EOMI, no nystagmus, external ears are normal, auditory meatus is clear and non-erythematous bilaterally, EAC appears normal, TM's appear normal bilaterally, oropharynx clear, mmm, no JVD or carotid bruit, no skin rash or lesions, chest CTAB, non-labored breathing, no w/r/r, +S1/S2, RRR, no m/r/g, abdomen soft, NT, ND, +BS, no peripheral edema or deformities, equal pulses upper and lower extremities, alert and oriented to person, place and time, clear speech, cranial nerves II-XII are intact, upper and lower extremity strength is 5/5, symmetrical against gravity and external force, sensation is intact, cerebellar testing of finger to nose is intact, coordination and gait are normal.

## 2023-02-09 NOTE — ED PROVIDER NOTE - CARE PLAN
1 Principal Discharge DX:	Headache   Principal Discharge DX:	Headache  Secondary Diagnosis:	Syncope

## 2023-02-09 NOTE — ED PROVIDER NOTE - OBJECTIVE STATEMENT
44 yo female, pmh of evonne, crohns, presents to ed for syncope, pt c/o ha frontal, typical to prior migraines. Denies fever, chills, cp, sob, neck pain, nvd, dizziness, numbness, tingling.

## 2023-02-09 NOTE — ED PROVIDER NOTE - PHYSICAL EXAMINATION
Physical Exam    Vital Signs: I have reviewed the initial vital signs.  Constitutional: appears stated age, no acute distress  Eyes: Conjunctiva pink, Sclera clear, PERRLA, EOMI, VA 20/20 b/l and 20/20 in each arm.   Cardiovascular: S1 and S2, regular rate, regular rhythm, well-perfused extremities, radial pulses equal and 2+, pedal pulses 2+ and equal  Respiratory: unlabored respiratory effort, clear to auscultation bilaterally no wheezing, rales and rhonchi  Gastrointestinal: soft, non-tender abdomen, no pulsatile mass, normal bowl sounds  Musculoskeletal: supple neck, no lower extremity edema, no midline tenderness  Integumentary: warm, dry, no rash  Neurologic: awake, alert, cranial nerves II-XII grossly intact, extremities’ motor and sensory functions grossly intact, normal speech, no pronator drift.   Psychiatric: appropriate mood, appropriate affect

## 2023-03-15 ENCOUNTER — EMERGENCY (EMERGENCY)
Facility: HOSPITAL | Age: 43
LOS: 0 days | Discharge: LEFT BEFORE DISCHARGE | End: 2023-03-15
Payer: MEDICAID

## 2023-03-15 VITALS
OXYGEN SATURATION: 98 % | RESPIRATION RATE: 20 BRPM | HEIGHT: 64 IN | TEMPERATURE: 98 F | SYSTOLIC BLOOD PRESSURE: 138 MMHG | DIASTOLIC BLOOD PRESSURE: 78 MMHG | HEART RATE: 88 BPM | WEIGHT: 134.92 LBS

## 2023-03-15 DIAGNOSIS — Z53.21 PROCEDURE AND TREATMENT NOT CARRIED OUT DUE TO PATIENT LEAVING PRIOR TO BEING SEEN BY HEALTH CARE PROVIDER: ICD-10-CM

## 2023-03-15 PROCEDURE — L9992: CPT

## 2023-03-15 NOTE — ED ADULT TRIAGE NOTE - CHIEF COMPLAINT QUOTE
I would like to talk to someone, I'm going through a lot and I need advise. If you knew what I was going through you'd be surprised I didn't come earlier .  - patient  Patient became upset when trying to get more information  about symptoms and left triage room . Tried to stop patient, she yanked triage door open and ran outside

## 2023-03-16 ENCOUNTER — EMERGENCY (EMERGENCY)
Facility: HOSPITAL | Age: 43
LOS: 0 days | Discharge: HOME | End: 2023-03-16
Attending: EMERGENCY MEDICINE
Payer: MEDICAID

## 2023-03-16 VITALS
RESPIRATION RATE: 20 BRPM | WEIGHT: 134.92 LBS | HEART RATE: 88 BPM | SYSTOLIC BLOOD PRESSURE: 174 MMHG | TEMPERATURE: 98 F | DIASTOLIC BLOOD PRESSURE: 82 MMHG | HEIGHT: 64 IN | OXYGEN SATURATION: 98 %

## 2023-03-16 DIAGNOSIS — F41.9 ANXIETY DISORDER, UNSPECIFIED: ICD-10-CM

## 2023-03-16 DIAGNOSIS — K50.90 CROHN'S DISEASE, UNSPECIFIED, WITHOUT COMPLICATIONS: ICD-10-CM

## 2023-03-16 DIAGNOSIS — J45.909 UNSPECIFIED ASTHMA, UNCOMPLICATED: ICD-10-CM

## 2023-03-16 DIAGNOSIS — K21.9 GASTRO-ESOPHAGEAL REFLUX DISEASE WITHOUT ESOPHAGITIS: ICD-10-CM

## 2023-03-16 DIAGNOSIS — Z59.00 HOMELESSNESS UNSPECIFIED: ICD-10-CM

## 2023-03-16 DIAGNOSIS — Z20.822 CONTACT WITH AND (SUSPECTED) EXPOSURE TO COVID-19: ICD-10-CM

## 2023-03-16 LAB
ANION GAP SERPL CALC-SCNC: 14 MMOL/L — SIGNIFICANT CHANGE UP (ref 7–14)
APAP SERPL-MCNC: <5 UG/ML — LOW (ref 10–30)
BASOPHILS # BLD AUTO: 0.05 K/UL — SIGNIFICANT CHANGE UP (ref 0–0.2)
BASOPHILS NFR BLD AUTO: 0.7 % — SIGNIFICANT CHANGE UP (ref 0–1)
BUN SERPL-MCNC: 16 MG/DL — SIGNIFICANT CHANGE UP (ref 10–20)
CALCIUM SERPL-MCNC: 9 MG/DL — SIGNIFICANT CHANGE UP (ref 8.4–10.5)
CHLORIDE SERPL-SCNC: 99 MMOL/L — SIGNIFICANT CHANGE UP (ref 98–110)
CO2 SERPL-SCNC: 22 MMOL/L — SIGNIFICANT CHANGE UP (ref 17–32)
CREAT SERPL-MCNC: 0.5 MG/DL — LOW (ref 0.7–1.5)
EGFR: 119 ML/MIN/1.73M2 — SIGNIFICANT CHANGE UP
EOSINOPHIL # BLD AUTO: 0.1 K/UL — SIGNIFICANT CHANGE UP (ref 0–0.7)
EOSINOPHIL NFR BLD AUTO: 1.3 % — SIGNIFICANT CHANGE UP (ref 0–8)
ETHANOL SERPL-MCNC: 140 MG/DL — HIGH
GLUCOSE SERPL-MCNC: 82 MG/DL — SIGNIFICANT CHANGE UP (ref 70–99)
HCT VFR BLD CALC: 43.7 % — SIGNIFICANT CHANGE UP (ref 37–47)
HGB BLD-MCNC: 15.4 G/DL — SIGNIFICANT CHANGE UP (ref 12–16)
IMM GRANULOCYTES NFR BLD AUTO: 0.1 % — SIGNIFICANT CHANGE UP (ref 0.1–0.3)
LYMPHOCYTES # BLD AUTO: 2.24 K/UL — SIGNIFICANT CHANGE UP (ref 1.2–3.4)
LYMPHOCYTES # BLD AUTO: 30 % — SIGNIFICANT CHANGE UP (ref 20.5–51.1)
MCHC RBC-ENTMCNC: 33.6 PG — HIGH (ref 27–31)
MCHC RBC-ENTMCNC: 35.2 G/DL — SIGNIFICANT CHANGE UP (ref 32–37)
MCV RBC AUTO: 95.4 FL — SIGNIFICANT CHANGE UP (ref 81–99)
MONOCYTES # BLD AUTO: 0.47 K/UL — SIGNIFICANT CHANGE UP (ref 0.1–0.6)
MONOCYTES NFR BLD AUTO: 6.3 % — SIGNIFICANT CHANGE UP (ref 1.7–9.3)
NEUTROPHILS # BLD AUTO: 4.59 K/UL — SIGNIFICANT CHANGE UP (ref 1.4–6.5)
NEUTROPHILS NFR BLD AUTO: 61.6 % — SIGNIFICANT CHANGE UP (ref 42.2–75.2)
NRBC # BLD: 0 /100 WBCS — SIGNIFICANT CHANGE UP (ref 0–0)
PLATELET # BLD AUTO: 315 K/UL — SIGNIFICANT CHANGE UP (ref 130–400)
POTASSIUM SERPL-MCNC: 3.6 MMOL/L — SIGNIFICANT CHANGE UP (ref 3.5–5)
POTASSIUM SERPL-SCNC: 3.6 MMOL/L — SIGNIFICANT CHANGE UP (ref 3.5–5)
RBC # BLD: 4.58 M/UL — SIGNIFICANT CHANGE UP (ref 4.2–5.4)
RBC # FLD: 12.3 % — SIGNIFICANT CHANGE UP (ref 11.5–14.5)
SALICYLATES SERPL-MCNC: <0.3 MG/DL — LOW (ref 4–30)
SARS-COV-2 RNA SPEC QL NAA+PROBE: SIGNIFICANT CHANGE UP
SODIUM SERPL-SCNC: 135 MMOL/L — SIGNIFICANT CHANGE UP (ref 135–146)
WBC # BLD: 7.46 K/UL — SIGNIFICANT CHANGE UP (ref 4.8–10.8)
WBC # FLD AUTO: 7.46 K/UL — SIGNIFICANT CHANGE UP (ref 4.8–10.8)

## 2023-03-16 PROCEDURE — 80307 DRUG TEST PRSMV CHEM ANLYZR: CPT

## 2023-03-16 PROCEDURE — 99285 EMERGENCY DEPT VISIT HI MDM: CPT

## 2023-03-16 PROCEDURE — 93010 ELECTROCARDIOGRAM REPORT: CPT

## 2023-03-16 PROCEDURE — 85025 COMPLETE CBC W/AUTO DIFF WBC: CPT

## 2023-03-16 PROCEDURE — 90792 PSYCH DIAG EVAL W/MED SRVCS: CPT | Mod: GC

## 2023-03-16 PROCEDURE — 99285 EMERGENCY DEPT VISIT HI MDM: CPT | Mod: 25

## 2023-03-16 PROCEDURE — 93005 ELECTROCARDIOGRAM TRACING: CPT

## 2023-03-16 PROCEDURE — 87635 SARS-COV-2 COVID-19 AMP PRB: CPT

## 2023-03-16 PROCEDURE — 80048 BASIC METABOLIC PNL TOTAL CA: CPT

## 2023-03-16 PROCEDURE — 36415 COLL VENOUS BLD VENIPUNCTURE: CPT

## 2023-03-16 RX ORDER — HYDROXYZINE HCL 10 MG
50 TABLET ORAL ONCE
Refills: 0 | Status: COMPLETED | OUTPATIENT
Start: 2023-03-16 | End: 2023-03-16

## 2023-03-16 RX ADMIN — Medication 50 MILLIGRAM(S): at 10:57

## 2023-03-16 SDOH — ECONOMIC STABILITY - HOUSING INSECURITY: HOMELESSNESS UNSPECIFIED: Z59.00

## 2023-03-16 NOTE — ED PROVIDER NOTE - OBJECTIVE STATEMENT
43-year-old female past medical history of Crohn's disease, GERD, asthma presents to the ED reporting worsening anxiety and requesting to speak to a psychiatrist.  Patient reports her life is very stressful lately and causing her to become increasingly anxious.  Patient tearful throughout history, reports she is currently homeless, has no family.  Patient reporting she is Orthodoxy and wishes that "God could take her life", states she would never take her own life.  Denies SI/HI, hallucinations.  Patient with no medical complaints.  Admits to drinking alcohol last night, denies any drug use.

## 2023-03-16 NOTE — ED BEHAVIORAL HEALTH ASSESSMENT NOTE - DESCRIPTION
Most recently domiciled in shelter but states not domiciled now, has 2 children,  Crohn's, asthma Seen by psychiatry. Spoke with ED team and recommended giving Atarax 50mg PO x 1. Also spoke with SW to see patient. Informed ED team SW is aware of patient and will see her.

## 2023-03-16 NOTE — ED PROVIDER NOTE - NSFOLLOWUPCLINICS_GEN_ALL_ED_FT
[facility]  [facility]  [address] [place] [id number]  Phone: [phone]  Fax:

## 2023-03-16 NOTE — ED BEHAVIORAL HEALTH ASSESSMENT NOTE - NS ED BHA AXIS I PRIMARY CODE FT
Physical Therapy     Referred by: Basim Cruz MD; Medical Diagnosis (from order):    Diagnosis Information      Diagnosis    840.7 (ICD-9-CM) - S43.431A (ICD-10-CM) - Anterior to posterior tear of superior glenoid labrum of right shoulder    726.12 (ICD-9-CM) - M75.21 (ICD-10-CM) - Bicipital tendinitis, right    719.41 (ICD-9-CM) - M25.511 (ICD-10-CM) - Right shoulder pain    840.4 (ICD-9-CM) - M75.111 (ICD-10-CM) - Partial thickness tear of right rotator cuff                Progress Note    Visit:  10     SUBJECTIVE                                                                                                               Patient reports that he feels like there is some progress with the R elbow. Patient reports that he had someone massage his arm and it helped. Patient reports that the shoulder feels tight when he does certain things.     OBJECTIVE                                                                                                                     Range of Motion (ROM)   (degrees unless noted; active unless noted; norms in ( ); negative=lacking to 0, positive=beyond 0)   Shoulder:     - Flexion (180):        • Left: WNL         • Right: WNL     - Abduction (180):        • Left: WNL         • Right: WNL    Strength  (out of 5 unless noted, standard test position unless noted, lbs tested with hand held dynamometer)   Shoulder:     - Abduction:        • Left: 5        • Right: 5, pain    - Internal Rotation:          • Left (at 90°): 5        • Right (at 90°): 5, pain    - External Rotation:         • Left (at 0°): 5        • Right (at 0°) :5     - Middle Trapezius Right: 4+    - Lower Trapezius Right: 4        02/08/22   Outcome Measures:   Quick Disabilities of the Arm, Shoulder and Hand: QuickDash Total Score: 20.45  (scored 0-100; a higher score indicates greater disability) see flowsheet for additional documentation    12/20/21 Outcome Measures:   Quick Disabilities of the Arm, Shoulder  and Hand: QuickDash Total Score: 36.36  (scored 0-100; a higher score indicates greater disability) see flowsheet for additional documentation  TREATMENT                                                                                                                  Therapeutic Exercise:  - R shoulder IR 1# and 2# x 15   - R shoulder ER and IR 3# with controlled IR x 15 x 2   - R shoulder ER and IR rotations with 4# ball x 15 x 2   - overhead press with 4# ball x 15   - prone Y lifts with 10 second holds x 15 x 2 // fatigue, no pain provocation   - quadruped rockbacks x 5 // no pain provocation   - wall pushup x 1     POST: patient still reports intermittent R elbow pain with varying activities     Skilled input: verbal instruction/cues and tactile instruction/cues    Writer verbally educated and received verbal consent for hand placement, positioning of patient, and techniques to be performed today from patient for therapist position for techniques and hand placement and palpation for techniques as described above and how they are pertinent to the patient's plan of care.    Home Exercise Program/Education Materials: [email or web address]  Access Code: [email or web address]  URL: [email or web address]  Date: 02/02/2022  Prepared by: Salma Myles    Exercises  Chest Press with Resistance - 1 x daily - 7 x weekly - 2 sets - 10 reps - 5 seconds hold  Seated Scapular Retraction - 1 x daily - 7 x weekly - 2 sets - 10 reps  Quadruped Alternating Arm Lift - 1 x daily - 7 x weekly - 3 sets - 10 reps  Standing Lat Pull Down with Resistance - Elbows Bent - 1 x daily - 7 x weekly - 2 sets - 10 reps  Standing Shoulder Horizontal Abduction with Resistance - 1 x daily - 7 x weekly - 2 sets - 10 reps  Standing Single Arm Shoulder PNF D1 Extension with Anchored Resistance - 1 x daily - 7 x weekly - 1 sets - 10 reps     ASSESSMENT                                                                                                              Patient is a 45 year old male presenting to outpatient physical therapy today with chief complaints of R anterior shoulder and R elbow pain. Patient presents with improving R shoulder flexion, abduction, and ER strength, but demonstrates increased familiar elbow pain provocation with resisted internal rotation. Patient participated today in R shoulder strengthening into external rotation and internal rotation, additionally for strengthening scapular musculature for increased ease with overhead lifting and to return to previous gym routines. Patient continues to reports slight elbow pain at the end of the session, but reports it has been improving since last visit. PT and patient discussed prognosis given continued reports of elbow pain and patient occupation as robotics instructor. Patient would continue to benefit from participation in skilled physical therapy intervention to address the aforementioned deficits, meet established goals, and return patient to their prior level of function.   To date the patient has made gains as expected as reported. Patient continues to have impairments and functional deficits as noted.  Patient will continue to benefit from skilled care as outlined.  Patient Education:   Results of above outlined education: Verbalizes understanding and Demonstrates understanding      PLAN                                                                                                                           Updates to plan of care: extend current plan of care    Frequency / Duration: 1 times per week tapering as patient progresses  for an estimated additional 6 visits for additional 10 weeks    Suggestions for next session as indicated: PLAN: strengthening, scapular musculature, STM, continue to monitor elbow pain      GOALS                                                                                                                           Long Term Goals: to be met by end of  plan of care  1. Decrease subjective c/o pain to 0-4/10 to decrease difficulty at work as robotics teacher  2. Increase strength of shoulder to decrease difficulty working out Status: met   3. Increase ROM to decrease difficulty reaching overhead Status: met   4. Independent home ex program  5. Quick DASH: Patient will complete form to reflect an improved calculated score to less than or equal to 26 to indicate patient reported improvement in function/disability/impairment. (minimal clinically important difference = 15.91)    Salma Myles PT, DPT    Therapy procedure time and total treatment time can be found documented on the Time Entry flowsheet   F41.9
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
## 2023-03-16 NOTE — ED PROVIDER NOTE - PROGRESS NOTE4
----- Message from Gab Hull MD sent at 12/13/2019 12:42 PM EST -----  Very mild steatosis, no evidence of Barnett or fibrosis which is excellent news  For her very mild steatosis she needs to achieve ideal body weight in 3 years, plenty of cardiovascular exercise low-fat diet  Recheck a CMP in 6 months  Follow-up with PCP    
Called pt and advised of the note from Dr Hull. She verb understanding. She will keep her appt with Dr Hull in June.   
Stable.

## 2023-03-16 NOTE — ED PROVIDER NOTE - ATTENDING APP SHARED VISIT CONTRIBUTION OF CARE
pt with anxiety over "life issues", sts cant function anymore. wants to speak to a therapist.    sts doesn't want to kill herself but she "wishes god would kill me". no HI, no AVH.   +EtOH use last night. denies drug use.  stays at a homeless shelter.    pt is crying.  ncat, perrl, eomi, neck sup, ctab, rrr, ab soft, nt, nd. nfd.    labs, ekg, psych eval
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
## 2023-03-16 NOTE — ED PROVIDER NOTE - PROGRESS NOTE DETAILS
MS- Psychiatry consulted and will see patient shortly Patient endorsed to Dr. Johnson MS- Patient seen by psychiatry who requested she be given 50 mg of Atarax.  Psychiatry reached out to  who are helping the patient arrange a living situation. MS- Spoke with social work who were able to set up the patient with [facility]. Plan for discharge with Psychiatry follow-up. Patient understands and agrees.
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
## 2023-03-16 NOTE — ED BEHAVIORAL HEALTH ASSESSMENT NOTE - NSSUICPROTFACT_PSY_ALL_CORE
Identifies reasons for living/Fear of death or the actual act of killing self/Cultural, spiritual and/or moral attitudes against suicide/Beloved pets

## 2023-03-16 NOTE — ED PROVIDER NOTE - NSFOLLOWUPINSTRUCTIONS_ED_ALL_ED_FT
Anxiety    Generalized anxiety disorder (ELDA) is a mental disorder. It is defined as anxiety that is not necessarily related to specific events or activities or is out of proportion to said events. Symptoms include restlessness, fatigue, difficulty concentrations, irritability and difficulty concentrating. It interferes with life functions, including relationships, work, and school. If you were started on a medication make sure to take exactly as prescribed and follow up with a psychiatrist.    SEEK IMMEDIATE MEDICAL CARE IF YOU HAVE THE FOLLOWING SYMPTOMS: thoughts about hurting killing yourself, thoughts about hurting or killing somebody else, hallucinations, or worsening depression.    Our Emergency Department Referral Coordinators will be reaching out to you in the next 24-48 hours from 9:00am to 5:00pm with a follow up appointment. Please expect a phone call from the hospital in that time frame. If you do not receive a call or if you have any questions or concerns, you can reach them at   [phone]

## 2023-03-16 NOTE — ED ADULT NURSE REASSESSMENT NOTE - NS ED NURSE REASSESS COMMENT FT1
Report received from RN. Patient reassessed. Patient sitting upright in chair. Patient calm, flat affect. VSS. A&O x4. Patient denies any pain/discomfort. Patient remains on 1:1 for safety. Safety & comfort maintained.

## 2023-03-16 NOTE — ED BEHAVIORAL HEALTH ASSESSMENT NOTE - UNDOMICILED
was previously living in Shelter/Safe House in \[facility]". Then transferred to shelter in the [place] however patient did not do the intake.  now undomiciled.
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
## 2023-03-16 NOTE — ED BEHAVIORAL HEALTH ASSESSMENT NOTE - REFERRAL / APPOINTMENT DETAILS
On discharge, can refer patient to [facility], located at [address] [place] [address], phone number - [phone] (patient can be given [phone] to make appointment themselves).

## 2023-03-16 NOTE — ED ADULT TRIAGE NOTE - CHIEF COMPLAINT QUOTE
My anxiety is through the roof. I want to talk to someone. I'm sick and tired of being afraid. - patient

## 2023-03-16 NOTE — ED PROVIDER NOTE - CLINICAL SUMMARY MEDICAL DECISION MAKING FREE TEXT BOX
42-year-old female presents to the ED for worsening anxiety requesting to speak to a psychiatrist.  Denies any SI or HI.  Physical exam is unremarkable.  Valuated by psych.  Advised Atarax.  Labs reviewed by me, values noted to be within normal limits.  Discharged home with outpatient follow-up.

## 2023-03-16 NOTE — ED PROVIDER NOTE - PATIENT PORTAL LINK FT
You can access the FollowMyHealth Patient Portal offered by [facility] by registering at the following website: [email or web address]. By joining [employer]’s FollowMyHealth portal, you will also be able to view your health information using other applications (apps) compatible with our system.

## 2023-03-16 NOTE — ED PROVIDER NOTE - PHYSICAL EXAMINATION
GENERAL: Well-nourished, Well-developed. NAD.  HEAD: No visible or palpable bumps or hematomas. No ecchymosis behind ears B/L.  Eyes: PERRLA, EOMI.   ENMT: MMM.   Neck: Supple. FROM  CVS: Normal S1,S2. No murmurs appreciated on auscultation   RESP:Lungs clear to auscultation B/L. No wheezing, rales, or rhonchi auscultated.  Skin: Warm, Dry. No rashes or lesions. Good cap refill < 2 sec B/L.  Neuro: AA&O x 3. Sensation grossly intact. Strength 5/5 B/L. Gait within normal limits.   Psych:  Appropriate mood and affect. Cooperative. anxious.
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
## 2024-01-17 NOTE — ED CDU PROVIDER INITIAL DAY NOTE - TOBACCO USE
BRIANAM, sent [facility] for pt to sched as a NEW with Dr. Buck per referral.    1/17/24 BD  
Current every day smoker

## 2024-03-24 NOTE — ED ADULT NURSE NOTE - NS ED NURSE DC INFO COMPLEXITY
PAST MEDICAL HISTORY:  Asthma     Cigarette smoker (0.25 ppd x 10 years)    Eczema      Simple: Patient demonstrates quick and easy understanding

## 2024-11-15 NOTE — ED PROVIDER NOTE - PMH
Anxiety    Asthma    Crohn's disease without complication, unspecified gastrointestinal tract location    
4 = No assist / stand by assistance

## 2025-01-24 NOTE — ED PROVIDER NOTE - ADMIT DISPOSITION PRESENT ON ADMISSION SEPSIS
acetaminophen 325 mg oral tablet: 3 tab(s) orally every 8 hours as needed for  mild pain  amLODIPine 5 mg oral tablet: 1 tab(s) orally once a day  aspirin 81 mg oral delayed release tablet: 1 tab(s) orally 2 times a day  Eliquis 2.5 mg oral tablet: 1 tab(s) orally 2 times a day  oxyCODONE 5 mg oral tablet: 1 tab(s) orally every 4 hours as needed for Severe Pain (7 - 10) MDD: 6  pantoprazole 40 mg oral delayed release tablet: 1 tab(s) orally once a day (in the morning)  polyethylene glycol 3350 oral powder for reconstitution: 17 gram(s) orally once a day  senna leaf extract oral tablet: 2 tab(s) orally once a day (at bedtime)  
No

## 2025-06-02 NOTE — ED ADULT TRIAGE NOTE - CCCP TRG CHIEF CMPLNT
Detail Level: Detailed Number Of Freeze-Thaw Cycles: 1 freeze-thaw cycle Render Post-Care Instructions In Note?: no Consent: The patient's consent was obtained including but not limited to risks of crusting, scabbing, blistering, scarring, darker or lighter pigmentary change, recurrence, incomplete removal and infection. Show Aperture Variable?: Yes Application Tool (Optional): Liquid Nitrogen Sprayer Duration Of Freeze Thaw-Cycle (Seconds): 0 Post-Care Instructions: I reviewed with the patient in detail post-care instructions. Patient is to wear sunprotection, and avoid picking at any of the treated lesions. Pt may apply Vaseline to crusted or scabbing areas. psychiatric evaluation

## 2025-06-06 NOTE — ED ADULT TRIAGE NOTE - HEART RATE (BEATS/MIN)
----- Message from HIRA BENAVIDEZ MA sent at 6/5/2025  1:06 PM EDT -----  Pt receives weekly injections and has 2 weeks left on Molds and 4 weeks left on Pollens.   Patient is now on maintenance dosing for Pollens only.    Patient has been tolerating allergy immunotherapy injections without incident.  Recommend continuing on the Maintenance dosing for Pollens.  Will mix refill of Maintenance Prescription Treatment Set.  Recommend advancing Prescription Treatment Set for Molds to the next stronger concentration.  Recommend continuing on the Escalated Build Up.  Please advise.   abdominal pain 88